# Patient Record
Sex: MALE | Race: ASIAN | NOT HISPANIC OR LATINO | URBAN - METROPOLITAN AREA
[De-identification: names, ages, dates, MRNs, and addresses within clinical notes are randomized per-mention and may not be internally consistent; named-entity substitution may affect disease eponyms.]

---

## 2022-02-08 ENCOUNTER — INPATIENT (INPATIENT)
Facility: HOSPITAL | Age: 72
LOS: 4 days | Discharge: HOME HEALTH | DRG: 235 | End: 2022-02-13
Attending: THORACIC SURGERY (CARDIOTHORACIC VASCULAR SURGERY) | Admitting: THORACIC SURGERY (CARDIOTHORACIC VASCULAR SURGERY)
Payer: MEDICARE

## 2022-02-08 ENCOUNTER — TRANSCRIPTION ENCOUNTER (OUTPATIENT)
Age: 72
End: 2022-02-08

## 2022-02-08 VITALS
SYSTOLIC BLOOD PRESSURE: 111 MMHG | TEMPERATURE: 98 F | HEIGHT: 64 IN | OXYGEN SATURATION: 100 % | DIASTOLIC BLOOD PRESSURE: 69 MMHG | WEIGHT: 89.07 LBS | RESPIRATION RATE: 14 BRPM | HEART RATE: 71 BPM

## 2022-02-08 PROBLEM — Z00.00 ENCOUNTER FOR PREVENTIVE HEALTH EXAMINATION: Status: ACTIVE | Noted: 2022-02-08

## 2022-02-08 LAB
A1C WITH ESTIMATED AVERAGE GLUCOSE RESULT: 5.4 % — SIGNIFICANT CHANGE UP (ref 4–5.6)
ALBUMIN SERPL ELPH-MCNC: 3.9 G/DL — SIGNIFICANT CHANGE UP (ref 3.3–5)
ALP SERPL-CCNC: 62 U/L — SIGNIFICANT CHANGE UP (ref 40–120)
ALT FLD-CCNC: 43 U/L — SIGNIFICANT CHANGE UP (ref 10–45)
ANION GAP SERPL CALC-SCNC: 17 MMOL/L — SIGNIFICANT CHANGE UP (ref 5–17)
APPEARANCE UR: CLEAR — SIGNIFICANT CHANGE UP
APTT BLD: 46.3 SEC — HIGH (ref 27.5–35.5)
AST SERPL-CCNC: 36 U/L — SIGNIFICANT CHANGE UP (ref 10–40)
BACTERIA # UR AUTO: PRESENT /HPF
BILIRUB DIRECT SERPL-MCNC: 0.2 MG/DL — SIGNIFICANT CHANGE UP (ref 0–0.3)
BILIRUB INDIRECT FLD-MCNC: 0.3 MG/DL — SIGNIFICANT CHANGE UP (ref 0.2–1)
BILIRUB SERPL-MCNC: 0.5 MG/DL — SIGNIFICANT CHANGE UP (ref 0.2–1.2)
BILIRUB SERPL-MCNC: 0.5 MG/DL — SIGNIFICANT CHANGE UP (ref 0.2–1.2)
BILIRUB UR-MCNC: NEGATIVE — SIGNIFICANT CHANGE UP
BLD GP AB SCN SERPL QL: NEGATIVE — SIGNIFICANT CHANGE UP
BUN SERPL-MCNC: 38 MG/DL — HIGH (ref 7–23)
CALCIUM SERPL-MCNC: 9.1 MG/DL — SIGNIFICANT CHANGE UP (ref 8.4–10.5)
CHLORIDE SERPL-SCNC: 99 MMOL/L — SIGNIFICANT CHANGE UP (ref 96–108)
CHOLEST SERPL-MCNC: 170 MG/DL — SIGNIFICANT CHANGE UP
CO2 SERPL-SCNC: 20 MMOL/L — LOW (ref 22–31)
COLOR SPEC: YELLOW — SIGNIFICANT CHANGE UP
CREAT SERPL-MCNC: 1.2 MG/DL — SIGNIFICANT CHANGE UP (ref 0.5–1.3)
DIFF PNL FLD: ABNORMAL
EPI CELLS # UR: SIGNIFICANT CHANGE UP /HPF (ref 0–5)
ESTIMATED AVERAGE GLUCOSE: 108 MG/DL — SIGNIFICANT CHANGE UP (ref 68–114)
GLUCOSE SERPL-MCNC: 92 MG/DL — SIGNIFICANT CHANGE UP (ref 70–99)
GLUCOSE UR QL: NEGATIVE — SIGNIFICANT CHANGE UP
HCT VFR BLD CALC: 45.1 % — SIGNIFICANT CHANGE UP (ref 39–50)
HDLC SERPL-MCNC: 67 MG/DL — SIGNIFICANT CHANGE UP
HGB BLD-MCNC: 15 G/DL — SIGNIFICANT CHANGE UP (ref 13–17)
INR BLD: 0.97 — SIGNIFICANT CHANGE UP (ref 0.88–1.16)
KETONES UR-MCNC: NEGATIVE — SIGNIFICANT CHANGE UP
LEUKOCYTE ESTERASE UR-ACNC: NEGATIVE — SIGNIFICANT CHANGE UP
LIPID PNL WITH DIRECT LDL SERPL: 90 MG/DL — SIGNIFICANT CHANGE UP
MAGNESIUM SERPL-MCNC: 2.3 MG/DL — SIGNIFICANT CHANGE UP (ref 1.6–2.6)
MCHC RBC-ENTMCNC: 32.1 PG — SIGNIFICANT CHANGE UP (ref 27–34)
MCHC RBC-ENTMCNC: 33.3 GM/DL — SIGNIFICANT CHANGE UP (ref 32–36)
MCV RBC AUTO: 96.6 FL — SIGNIFICANT CHANGE UP (ref 80–100)
NITRITE UR-MCNC: NEGATIVE — SIGNIFICANT CHANGE UP
NON HDL CHOLESTEROL: 103 MG/DL — SIGNIFICANT CHANGE UP
NRBC # BLD: 0 /100 WBCS — SIGNIFICANT CHANGE UP (ref 0–0)
NT-PROBNP SERPL-SCNC: 2441 PG/ML — HIGH (ref 0–300)
PH UR: 6.5 — SIGNIFICANT CHANGE UP (ref 5–8)
PLATELET # BLD AUTO: 212 K/UL — SIGNIFICANT CHANGE UP (ref 150–400)
POTASSIUM SERPL-MCNC: 4.6 MMOL/L — SIGNIFICANT CHANGE UP (ref 3.5–5.3)
POTASSIUM SERPL-SCNC: 4.6 MMOL/L — SIGNIFICANT CHANGE UP (ref 3.5–5.3)
PROT SERPL-MCNC: 7.1 G/DL — SIGNIFICANT CHANGE UP (ref 6–8.3)
PROT UR-MCNC: NEGATIVE MG/DL — SIGNIFICANT CHANGE UP
PROTHROM AB SERPL-ACNC: 11.6 SEC — SIGNIFICANT CHANGE UP (ref 10.6–13.6)
RBC # BLD: 4.67 M/UL — SIGNIFICANT CHANGE UP (ref 4.2–5.8)
RBC # FLD: 14.4 % — SIGNIFICANT CHANGE UP (ref 10.3–14.5)
RBC CASTS # UR COMP ASSIST: < 5 /HPF — SIGNIFICANT CHANGE UP
RH IG SCN BLD-IMP: POSITIVE — SIGNIFICANT CHANGE UP
SARS-COV-2 RNA SPEC QL NAA+PROBE: SIGNIFICANT CHANGE UP
SODIUM SERPL-SCNC: 136 MMOL/L — SIGNIFICANT CHANGE UP (ref 135–145)
SP GR SPEC: 1.01 — SIGNIFICANT CHANGE UP (ref 1–1.03)
TRIGL SERPL-MCNC: 64 MG/DL — SIGNIFICANT CHANGE UP
TSH SERPL-MCNC: 7.64 UIU/ML — HIGH (ref 0.27–4.2)
UROBILINOGEN FLD QL: 0.2 E.U./DL — SIGNIFICANT CHANGE UP
WBC # BLD: 5.4 K/UL — SIGNIFICANT CHANGE UP (ref 3.8–10.5)
WBC # FLD AUTO: 5.4 K/UL — SIGNIFICANT CHANGE UP (ref 3.8–10.5)
WBC UR QL: < 5 /HPF — SIGNIFICANT CHANGE UP

## 2022-02-08 PROCEDURE — 74176 CT ABD & PELVIS W/O CONTRAST: CPT | Mod: 26

## 2022-02-08 PROCEDURE — 71046 X-RAY EXAM CHEST 2 VIEWS: CPT | Mod: 26

## 2022-02-08 RX ORDER — ATORVASTATIN CALCIUM 80 MG/1
80 TABLET, FILM COATED ORAL AT BEDTIME
Refills: 0 | Status: DISCONTINUED | OUTPATIENT
Start: 2022-02-08 | End: 2022-02-13

## 2022-02-08 RX ORDER — HEPARIN SODIUM 5000 [USP'U]/ML
2500 INJECTION INTRAVENOUS; SUBCUTANEOUS EVERY 8 HOURS
Refills: 0 | Status: DISCONTINUED | OUTPATIENT
Start: 2022-02-08 | End: 2022-02-13

## 2022-02-08 RX ORDER — CHLORHEXIDINE GLUCONATE 213 G/1000ML
1 SOLUTION TOPICAL ONCE
Refills: 0 | Status: COMPLETED | OUTPATIENT
Start: 2022-02-08 | End: 2022-02-08

## 2022-02-08 RX ORDER — PANTOPRAZOLE SODIUM 20 MG/1
40 TABLET, DELAYED RELEASE ORAL
Refills: 0 | Status: DISCONTINUED | OUTPATIENT
Start: 2022-02-08 | End: 2022-02-13

## 2022-02-08 RX ORDER — METOPROLOL TARTRATE 50 MG
12.5 TABLET ORAL
Refills: 0 | Status: DISCONTINUED | OUTPATIENT
Start: 2022-02-08 | End: 2022-02-09

## 2022-02-08 RX ORDER — ASPIRIN/CALCIUM CARB/MAGNESIUM 324 MG
81 TABLET ORAL DAILY
Refills: 0 | Status: DISCONTINUED | OUTPATIENT
Start: 2022-02-08 | End: 2022-02-13

## 2022-02-08 RX ORDER — CHLORHEXIDINE GLUCONATE 213 G/1000ML
15 SOLUTION TOPICAL ONCE
Refills: 0 | Status: DISCONTINUED | OUTPATIENT
Start: 2022-02-08 | End: 2022-02-09

## 2022-02-08 RX ORDER — HEPARIN SODIUM 5000 [USP'U]/ML
5000 INJECTION INTRAVENOUS; SUBCUTANEOUS EVERY 12 HOURS
Refills: 0 | Status: DISCONTINUED | OUTPATIENT
Start: 2022-02-08 | End: 2022-02-08

## 2022-02-08 RX ORDER — CHLORHEXIDINE GLUCONATE 213 G/1000ML
1 SOLUTION TOPICAL ONCE
Refills: 0 | Status: COMPLETED | OUTPATIENT
Start: 2022-02-09 | End: 2022-02-09

## 2022-02-08 RX ORDER — SODIUM CHLORIDE 9 MG/ML
3 INJECTION INTRAMUSCULAR; INTRAVENOUS; SUBCUTANEOUS EVERY 8 HOURS
Refills: 0 | Status: DISCONTINUED | OUTPATIENT
Start: 2022-02-08 | End: 2022-02-09

## 2022-02-08 RX ADMIN — CHLORHEXIDINE GLUCONATE 1 APPLICATION(S): 213 SOLUTION TOPICAL at 20:00

## 2022-02-08 RX ADMIN — CHLORHEXIDINE GLUCONATE 1 APPLICATION(S): 213 SOLUTION TOPICAL at 22:00

## 2022-02-08 RX ADMIN — ATORVASTATIN CALCIUM 80 MILLIGRAM(S): 80 TABLET, FILM COATED ORAL at 21:03

## 2022-02-08 RX ADMIN — Medication 12.5 MILLIGRAM(S): at 20:27

## 2022-02-08 RX ADMIN — SODIUM CHLORIDE 3 MILLILITER(S): 9 INJECTION INTRAMUSCULAR; INTRAVENOUS; SUBCUTANEOUS at 22:42

## 2022-02-08 RX ADMIN — HEPARIN SODIUM 5000 UNIT(S): 5000 INJECTION INTRAVENOUS; SUBCUTANEOUS at 20:27

## 2022-02-08 NOTE — PRE-OP CHECKLIST - SELECT TESTS ORDERED
CMP/PT/PTT/INR/Type and Screen/Urinalysis/EKG/CXR/Results in MD note/COVID-19 90/CMP/PT/PTT/INR/Type and Screen/Urinalysis/EKG/CXR/Results in MD note/POCT Blood Glucose/COVID-19

## 2022-02-08 NOTE — H&P ADULT - NSICDXPASTMEDICALHX_GEN_ALL_CORE_FT
PAST MEDICAL HISTORY:  Acute on chronic combined systolic and diastolic congestive heart failure     Prediabetes     Smoking hx

## 2022-02-08 NOTE — H&P ADULT - NSHPSOCIALHISTORY_GEN_ALL_CORE
Tobacco:  Alcohol:  Elicit drugs:  Occupation:  Home: Lives with , Stairs, assist devices, ADL Tobacco: 40 years of 1ppd smoking, stopped last week for upcoming surgery   Alcohol: 1 drink per night, stopped 1 month ago  Elicit drugs: No  Occupation:   Home: Lives with , Stairs, assist devices, ADL Tobacco: 40 years of 1ppd smoking, stopped last week for upcoming surgery   Alcohol: 1 drink per night, stopped 1 month ago  Elicit drugs: No  Occupation:   Home:

## 2022-02-08 NOTE — H&P ADULT - HISTORY OF PRESENT ILLNESS
70 yo Greenlandic speaking male who is a current smoker (stopped 1 wk ago, smoked for 40 years) with PMHx of CHF (EF 25-30%) and prediabetes (A1c 5.9) coming in for presurgical evaluation. Pt went to [facility] on 2/6 complaining of SOB x 1 week. He stated he had similar symptoms 5 months ago and was admitted but never followed up for outpatient workup. On 2/6 a TTE was done showing LVEF 25-30% and acute on chronic systolic and diastolic CHF. He was then send to [place] for a CT coronary angio on 2/7 which showed severe heavily calcified proximal LAD stenosis to distal L Main Artery. Now presents asymptotically at [facility] for evaluation for Robotic MIDCABG MARTINEZ-LAD with Dr. Santos on 2/9. Denies CP, SOB, abd pain, bowel or urinary changes, N/V, HA, dizziness, vision changes, weakness, dizziness, numbness or tingling, previous abdominal or thoracic surgeries, previous bleeding conditions, recent ASA or blood thinner use. 72 yo Wolof speaking male who is a current smoker (stopped 1 wk ago, smoked for 40 years) with PMHx of CHF (EF 25-30%) and prediabetes (A1c 5.9) coming in for presurgical evaluation. Pt went to [facility] on 2/6 complaining of SOB x 1 week. He stated he had similar symptoms 5 months ago and was admitted but never followed up for outpatient workup. On 2/6 a TTE was done showing LVEF 25-30% and acute on chronic systolic and diastolic CHF. He was then send to [place] for a CT coronary angio on 2/7 which showed severe heavily calcified proximal LAD stenosis to distal L Main Artery. Now presents asymptotically at [facility] for evaluation for Robotic MIDCABG MARTINEZ-LAD with Dr. Santos on 2/9. Upon arrival pt denies CP, SOB, abd pain, bowel or urinary changes, N/V, HA, dizziness, vision changes, weakness, dizziness, numbness or tingling, previous abdominal or thoracic surgeries, previous bleeding conditions, recent ASA or blood thinner use. 72 yo Maltese speaking male current smoker (stopped 1 wk ago, smoked for 40 years) with PMHx of CHF (EF 25-30%) and prediabetes (A1c 5.9) coming in for presurgical evaluation. He went to [facility] on 2/6 complaining of SOB x 1 week. Pt stated he had similar symptoms 5 months ago and was admitted but never followed up for an outpatient workup. On 2/6 a TTE was done showing LVEF 25-30% and acute on chronic systolic and diastolic CHF. He was then send to [facility] for a CT Coronary Angio on 2/7 which showed severe heavily calcified proximal LAD stenosis to distal L Main Artery. Now presents asymptotically at [facility] for surgical evaluation in anticipation of Robotic MIDCABG LIMA-LAD with Dr. Santos on 2/9. Upon arrival pt denies CP, SOB, abd pain, bowel or urinary changes, N/V, HA, dizziness, vision changes, weakness, dizziness, numbness or tingling, previous abdominal or thoracic surgeries, previous bleeding conditions, recent ASA or blood thinner use.

## 2022-02-08 NOTE — H&P ADULT - NSHPPHYSICALEXAM_GEN_ALL_CORE
Neuro: A+O x 3, non-focal, speech clear and intact  HEENT: PERRL, EOMI, oral mucosa pink and moist  Neck: supple, no JVD  CV: regular rate, regular rhythm, +S1S2, no murmurs or rub  Pulm/chest: lung sounds CTA and equal bilaterally, no accessory muscle use noted  Abd: soft, NT, ND, +BS  Ext: JAY x 4, no C/C/E  Skin: warm, well perfused, no rashes Neuro: A+O x 3, non-focal, speech clear and intact  HEENT: + conjunctival icterus, PERRL, EOMI, oral mucosa pink and moist  Neck: supple, no JVD  CV: regular rate, regular rhythm, +S1S2, no murmurs or rub  Pulm/chest: lung sounds CTA and equal bilaterally, no accessory muscle use noted  Abd: soft, NTTP, ND, +BS  Ext: JAY x 4, no C/C/E, +2 pulses B/L   Skin: warm, well perfused, no rashes

## 2022-02-08 NOTE — PATIENT PROFILE ADULT - FALL HARM RISK - UNIVERSAL INTERVENTIONS
Bed in lowest position, wheels locked, appropriate side rails in place/Call bell, personal items and telephone in reach/Instruct patient to call for assistance before getting out of bed or chair/Non-slip footwear when patient is out of bed/[place] to call system/Physically safe environment - no spills, clutter or unnecessary equipment/Purposeful Proactive Rounding/Room/bathroom lighting operational, light cord in reach

## 2022-02-09 ENCOUNTER — APPOINTMENT (OUTPATIENT)
Dept: CARDIOTHORACIC SURGERY | Facility: HOSPITAL | Age: 72
End: 2022-02-09

## 2022-02-09 DIAGNOSIS — Z86.39 PERSONAL HISTORY OF OTHER ENDOCRINE, NUTRITIONAL AND METABOLIC DISEASE: ICD-10-CM

## 2022-02-09 DIAGNOSIS — I50.22 CHRONIC SYSTOLIC (CONGESTIVE) HEART FAILURE: ICD-10-CM

## 2022-02-09 DIAGNOSIS — Z09 ENCOUNTER FOR FOLLOW-UP EXAMINATION AFTER COMPLETED TREATMENT FOR CONDITIONS OTHER THAN MALIGNANT NEOPLASM: ICD-10-CM

## 2022-02-09 DIAGNOSIS — K31.89 OTHER DISEASES OF STOMACH AND DUODENUM: ICD-10-CM

## 2022-02-09 LAB
ALBUMIN SERPL ELPH-MCNC: 3.2 G/DL — LOW (ref 3.3–5)
ALBUMIN SERPL ELPH-MCNC: 4.3 G/DL — SIGNIFICANT CHANGE UP (ref 3.3–5)
ALP SERPL-CCNC: 50 U/L — SIGNIFICANT CHANGE UP (ref 40–120)
ALP SERPL-CCNC: 52 U/L — SIGNIFICANT CHANGE UP (ref 40–120)
ALT FLD-CCNC: 27 U/L — SIGNIFICANT CHANGE UP (ref 10–45)
ALT FLD-CCNC: 28 U/L — SIGNIFICANT CHANGE UP (ref 10–45)
ANION GAP SERPL CALC-SCNC: 10 MMOL/L — SIGNIFICANT CHANGE UP (ref 5–17)
ANION GAP SERPL CALC-SCNC: 11 MMOL/L — SIGNIFICANT CHANGE UP (ref 5–17)
ANION GAP SERPL CALC-SCNC: 13 MMOL/L — SIGNIFICANT CHANGE UP (ref 5–17)
APTT BLD: 28.3 SEC — SIGNIFICANT CHANGE UP (ref 27.5–35.5)
APTT BLD: 31.8 SEC — SIGNIFICANT CHANGE UP (ref 27.5–35.5)
AST SERPL-CCNC: 21 U/L — SIGNIFICANT CHANGE UP (ref 10–40)
AST SERPL-CCNC: 24 U/L — SIGNIFICANT CHANGE UP (ref 10–40)
BASOPHILS # BLD AUTO: 0 K/UL — SIGNIFICANT CHANGE UP (ref 0–0.2)
BASOPHILS NFR BLD AUTO: 0 % — SIGNIFICANT CHANGE UP (ref 0–2)
BILIRUB SERPL-MCNC: 0.4 MG/DL — SIGNIFICANT CHANGE UP (ref 0.2–1.2)
BILIRUB SERPL-MCNC: 0.6 MG/DL — SIGNIFICANT CHANGE UP (ref 0.2–1.2)
BUN SERPL-MCNC: 28 MG/DL — HIGH (ref 7–23)
BUN SERPL-MCNC: 30 MG/DL — HIGH (ref 7–23)
BUN SERPL-MCNC: 34 MG/DL — HIGH (ref 7–23)
CALCIUM SERPL-MCNC: 8.4 MG/DL — SIGNIFICANT CHANGE UP (ref 8.4–10.5)
CALCIUM SERPL-MCNC: 8.9 MG/DL — SIGNIFICANT CHANGE UP (ref 8.4–10.5)
CALCIUM SERPL-MCNC: 9.3 MG/DL — SIGNIFICANT CHANGE UP (ref 8.4–10.5)
CHLORIDE SERPL-SCNC: 104 MMOL/L — SIGNIFICANT CHANGE UP (ref 96–108)
CHLORIDE SERPL-SCNC: 105 MMOL/L — SIGNIFICANT CHANGE UP (ref 96–108)
CHLORIDE SERPL-SCNC: 106 MMOL/L — SIGNIFICANT CHANGE UP (ref 96–108)
CO2 SERPL-SCNC: 22 MMOL/L — SIGNIFICANT CHANGE UP (ref 22–31)
CO2 SERPL-SCNC: 23 MMOL/L — SIGNIFICANT CHANGE UP (ref 22–31)
CO2 SERPL-SCNC: 25 MMOL/L — SIGNIFICANT CHANGE UP (ref 22–31)
CREAT SERPL-MCNC: 0.82 MG/DL — SIGNIFICANT CHANGE UP (ref 0.5–1.3)
CREAT SERPL-MCNC: 0.84 MG/DL — SIGNIFICANT CHANGE UP (ref 0.5–1.3)
CREAT SERPL-MCNC: 0.98 MG/DL — SIGNIFICANT CHANGE UP (ref 0.5–1.3)
EOSINOPHIL # BLD AUTO: 0.14 K/UL — SIGNIFICANT CHANGE UP (ref 0–0.5)
EOSINOPHIL NFR BLD AUTO: 2.6 % — SIGNIFICANT CHANGE UP (ref 0–6)
GAS PNL BLDA: SIGNIFICANT CHANGE UP
GIANT PLATELETS BLD QL SMEAR: PRESENT — SIGNIFICANT CHANGE UP
GLUCOSE BLDC GLUCOMTR-MCNC: 120 MG/DL — HIGH (ref 70–99)
GLUCOSE BLDC GLUCOMTR-MCNC: 128 MG/DL — HIGH (ref 70–99)
GLUCOSE BLDC GLUCOMTR-MCNC: 90 MG/DL — SIGNIFICANT CHANGE UP (ref 70–99)
GLUCOSE SERPL-MCNC: 124 MG/DL — HIGH (ref 70–99)
GLUCOSE SERPL-MCNC: 127 MG/DL — HIGH (ref 70–99)
GLUCOSE SERPL-MCNC: 90 MG/DL — SIGNIFICANT CHANGE UP (ref 70–99)
HCT VFR BLD CALC: 36.4 % — LOW (ref 39–50)
HCT VFR BLD CALC: 37.4 % — LOW (ref 39–50)
HCT VFR BLD CALC: 43.4 % — SIGNIFICANT CHANGE UP (ref 39–50)
HCV AB S/CO SERPL IA: 0.04 S/CO — SIGNIFICANT CHANGE UP
HCV AB SERPL-IMP: SIGNIFICANT CHANGE UP
HGB BLD-MCNC: 11.9 G/DL — LOW (ref 13–17)
HGB BLD-MCNC: 12.7 G/DL — LOW (ref 13–17)
HGB BLD-MCNC: 13.6 G/DL — SIGNIFICANT CHANGE UP (ref 13–17)
INR BLD: 0.94 — SIGNIFICANT CHANGE UP (ref 0.88–1.16)
INR BLD: 1.01 — SIGNIFICANT CHANGE UP (ref 0.88–1.16)
ISTAT ARTERIAL BE: 5 MMOL/L — HIGH (ref -2–3)
ISTAT ARTERIAL GLUCOSE: 117 MG/DL — HIGH (ref 70–99)
ISTAT ARTERIAL HCO3: 30 MMOL/L — HIGH (ref 22–26)
ISTAT ARTERIAL HEMATOCRIT: 33 % — LOW (ref 39–50)
ISTAT ARTERIAL HEMOGLOBIN: 11.2 G/DL — LOW (ref 13–17)
ISTAT ARTERIAL IONIZED CALCIUM: 1.11 MMOL/L — LOW (ref 1.12–1.3)
ISTAT ARTERIAL PCO2: 47 MMHG — HIGH (ref 35–45)
ISTAT ARTERIAL PH: 7.42 — SIGNIFICANT CHANGE UP (ref 7.35–7.45)
ISTAT ARTERIAL PO2: 443 MMHG — HIGH (ref 80–105)
ISTAT ARTERIAL POTASSIUM: 3.6 MMOL/L — SIGNIFICANT CHANGE UP (ref 3.5–5.3)
ISTAT ARTERIAL SO2: 100 % — HIGH (ref 95–98)
ISTAT ARTERIAL SODIUM: 138 MMOL/L — SIGNIFICANT CHANGE UP (ref 135–145)
ISTAT ARTERIAL TCO2: 32 MMOL/L — HIGH (ref 22–31)
LYMPHOCYTES # BLD AUTO: 0.32 K/UL — LOW (ref 1–3.3)
LYMPHOCYTES # BLD AUTO: 6.1 % — LOW (ref 13–44)
MAGNESIUM SERPL-MCNC: 2.1 MG/DL — SIGNIFICANT CHANGE UP (ref 1.6–2.6)
MAGNESIUM SERPL-MCNC: 2.3 MG/DL — SIGNIFICANT CHANGE UP (ref 1.6–2.6)
MAGNESIUM SERPL-MCNC: 2.3 MG/DL — SIGNIFICANT CHANGE UP (ref 1.6–2.6)
MANUAL SMEAR VERIFICATION: SIGNIFICANT CHANGE UP
MCHC RBC-ENTMCNC: 30.4 PG — SIGNIFICANT CHANGE UP (ref 27–34)
MCHC RBC-ENTMCNC: 30.5 PG — SIGNIFICANT CHANGE UP (ref 27–34)
MCHC RBC-ENTMCNC: 31.3 GM/DL — LOW (ref 32–36)
MCHC RBC-ENTMCNC: 32 PG — SIGNIFICANT CHANGE UP (ref 27–34)
MCHC RBC-ENTMCNC: 32.7 GM/DL — SIGNIFICANT CHANGE UP (ref 32–36)
MCHC RBC-ENTMCNC: 34 GM/DL — SIGNIFICANT CHANGE UP (ref 32–36)
MCV RBC AUTO: 93.3 FL — SIGNIFICANT CHANGE UP (ref 80–100)
MCV RBC AUTO: 94.2 FL — SIGNIFICANT CHANGE UP (ref 80–100)
MCV RBC AUTO: 97.1 FL — SIGNIFICANT CHANGE UP (ref 80–100)
MONOCYTES # BLD AUTO: 0.09 K/UL — SIGNIFICANT CHANGE UP (ref 0–0.9)
MONOCYTES NFR BLD AUTO: 1.8 % — LOW (ref 2–14)
NEUTROPHILS # BLD AUTO: 4.64 K/UL — SIGNIFICANT CHANGE UP (ref 1.8–7.4)
NEUTROPHILS NFR BLD AUTO: 88.6 % — HIGH (ref 43–77)
NRBC # BLD: 0 /100 WBCS — SIGNIFICANT CHANGE UP (ref 0–0)
NRBC # BLD: 0 /100 WBCS — SIGNIFICANT CHANGE UP (ref 0–0)
OVALOCYTES BLD QL SMEAR: SLIGHT — SIGNIFICANT CHANGE UP
PHOSPHATE SERPL-MCNC: 3.3 MG/DL — SIGNIFICANT CHANGE UP (ref 2.5–4.5)
PHOSPHATE SERPL-MCNC: 3.8 MG/DL — SIGNIFICANT CHANGE UP (ref 2.5–4.5)
PLAT MORPH BLD: NORMAL — SIGNIFICANT CHANGE UP
PLATELET # BLD AUTO: 172 K/UL — SIGNIFICANT CHANGE UP (ref 150–400)
PLATELET # BLD AUTO: 173 K/UL — SIGNIFICANT CHANGE UP (ref 150–400)
PLATELET # BLD AUTO: 195 K/UL — SIGNIFICANT CHANGE UP (ref 150–400)
POLYCHROMASIA BLD QL SMEAR: SLIGHT — SIGNIFICANT CHANGE UP
POTASSIUM SERPL-MCNC: 3.9 MMOL/L — SIGNIFICANT CHANGE UP (ref 3.5–5.3)
POTASSIUM SERPL-MCNC: 4.4 MMOL/L — SIGNIFICANT CHANGE UP (ref 3.5–5.3)
POTASSIUM SERPL-MCNC: 4.5 MMOL/L — SIGNIFICANT CHANGE UP (ref 3.5–5.3)
POTASSIUM SERPL-SCNC: 3.9 MMOL/L — SIGNIFICANT CHANGE UP (ref 3.5–5.3)
POTASSIUM SERPL-SCNC: 4.4 MMOL/L — SIGNIFICANT CHANGE UP (ref 3.5–5.3)
POTASSIUM SERPL-SCNC: 4.5 MMOL/L — SIGNIFICANT CHANGE UP (ref 3.5–5.3)
PROT SERPL-MCNC: 5.8 G/DL — LOW (ref 6–8.3)
PROT SERPL-MCNC: 7.1 G/DL — SIGNIFICANT CHANGE UP (ref 6–8.3)
PROTHROM AB SERPL-ACNC: 11.3 SEC — SIGNIFICANT CHANGE UP (ref 10.6–13.6)
PROTHROM AB SERPL-ACNC: 12.1 SEC — SIGNIFICANT CHANGE UP (ref 10.6–13.6)
RBC # BLD: 3.9 M/UL — LOW (ref 4.2–5.8)
RBC # BLD: 3.97 M/UL — LOW (ref 4.2–5.8)
RBC # BLD: 4.47 M/UL — SIGNIFICANT CHANGE UP (ref 4.2–5.8)
RBC # FLD: 13.8 % — SIGNIFICANT CHANGE UP (ref 10.3–14.5)
RBC # FLD: 14.1 % — SIGNIFICANT CHANGE UP (ref 10.3–14.5)
RBC # FLD: 14.1 % — SIGNIFICANT CHANGE UP (ref 10.3–14.5)
RBC BLD AUTO: ABNORMAL
RH IG SCN BLD-IMP: POSITIVE — SIGNIFICANT CHANGE UP
SODIUM SERPL-SCNC: 139 MMOL/L — SIGNIFICANT CHANGE UP (ref 135–145)
SODIUM SERPL-SCNC: 140 MMOL/L — SIGNIFICANT CHANGE UP (ref 135–145)
SODIUM SERPL-SCNC: 140 MMOL/L — SIGNIFICANT CHANGE UP (ref 135–145)
VARIANT LYMPHS # BLD: 0.9 % — SIGNIFICANT CHANGE UP (ref 0–6)
WBC # BLD: 10.86 K/UL — HIGH (ref 3.8–10.5)
WBC # BLD: 5.24 K/UL — SIGNIFICANT CHANGE UP (ref 3.8–10.5)
WBC # BLD: 5.71 K/UL — SIGNIFICANT CHANGE UP (ref 3.8–10.5)
WBC # FLD AUTO: 10.86 K/UL — HIGH (ref 3.8–10.5)
WBC # FLD AUTO: 5.24 K/UL — SIGNIFICANT CHANGE UP (ref 3.8–10.5)
WBC # FLD AUTO: 5.71 K/UL — SIGNIFICANT CHANGE UP (ref 3.8–10.5)

## 2022-02-09 PROCEDURE — 33533 CABG ARTERIAL SINGLE: CPT | Mod: AS

## 2022-02-09 PROCEDURE — 99292 CRITICAL CARE ADDL 30 MIN: CPT

## 2022-02-09 PROCEDURE — 71045 X-RAY EXAM CHEST 1 VIEW: CPT | Mod: 26

## 2022-02-09 PROCEDURE — 93306 TTE W/DOPPLER COMPLETE: CPT | Mod: 26

## 2022-02-09 PROCEDURE — 93880 EXTRACRANIAL BILAT STUDY: CPT | Mod: 26

## 2022-02-09 PROCEDURE — 93010 ELECTROCARDIOGRAM REPORT: CPT

## 2022-02-09 PROCEDURE — 33533 CABG ARTERIAL SINGLE: CPT

## 2022-02-09 PROCEDURE — 99291 CRITICAL CARE FIRST HOUR: CPT

## 2022-02-09 DEVICE — CHEST DRAIN THORACIC PVC 32FR: Type: IMPLANTABLE DEVICE | Status: FUNCTIONAL

## 2022-02-09 DEVICE — SHUNT FLO-THRU INTRALUMINAL 2MM X 18MM: Type: IMPLANTABLE DEVICE | Status: FUNCTIONAL

## 2022-02-09 DEVICE — SHUNT CORONARY MAQUET AXIUS 1.5MM: Type: IMPLANTABLE DEVICE | Status: FUNCTIONAL

## 2022-02-09 DEVICE — SHUNT FLO-THRU INTRALUMINAL 1.25MM X 18MM: Type: IMPLANTABLE DEVICE | Status: FUNCTIONAL

## 2022-02-09 DEVICE — SHUNT FLO-THRU INTRALUMINAL 2.5MM X 18MM: Type: IMPLANTABLE DEVICE | Status: FUNCTIONAL

## 2022-02-09 DEVICE — CHEST DRAIN THORACIC PVC 28FR RIGHT ANGLE: Type: IMPLANTABLE DEVICE | Status: FUNCTIONAL

## 2022-02-09 DEVICE — SHUNT CORONARY MAQUET AXIUS 2.75MM: Type: IMPLANTABLE DEVICE | Status: FUNCTIONAL

## 2022-02-09 DEVICE — SHUNT CORONARY MAQUET AXIUS 1.75MM: Type: IMPLANTABLE DEVICE | Status: FUNCTIONAL

## 2022-02-09 DEVICE — SHUNT FLO-THRU INTRALUMINAL 3MM X 18MM: Type: IMPLANTABLE DEVICE | Status: FUNCTIONAL

## 2022-02-09 DEVICE — SHUNT CORONARY MAQUET AXIUS 2.25MM: Type: IMPLANTABLE DEVICE | Status: FUNCTIONAL

## 2022-02-09 DEVICE — LIGATING CLIPS WECK HEMOLOK POLYMER LARGE (PURPLE) 6: Type: IMPLANTABLE DEVICE | Status: FUNCTIONAL

## 2022-02-09 DEVICE — SHUNT FLO-THRU INTRALUMINAL1.75MM X 18MM: Type: IMPLANTABLE DEVICE | Status: FUNCTIONAL

## 2022-02-09 DEVICE — SHUNT CORONARY MAQUET AXIUS 2MM: Type: IMPLANTABLE DEVICE | Status: FUNCTIONAL

## 2022-02-09 DEVICE — SURGICEL 4 X 8": Type: IMPLANTABLE DEVICE | Status: FUNCTIONAL

## 2022-02-09 DEVICE — SHUNT CORONARY MAQUET AXIUS 2.5MM: Type: IMPLANTABLE DEVICE | Status: FUNCTIONAL

## 2022-02-09 DEVICE — SHUNT CORONARY MAQUET AXIUS 1.25MM: Type: IMPLANTABLE DEVICE | Status: FUNCTIONAL

## 2022-02-09 DEVICE — CHEST DRAIN THORACIC PVC 32FR RIGHT ANGLE: Type: IMPLANTABLE DEVICE | Status: FUNCTIONAL

## 2022-02-09 DEVICE — SHUNT FLO-THRU INTRALUMINAL1.5MM X 18MM: Type: IMPLANTABLE DEVICE | Status: FUNCTIONAL

## 2022-02-09 RX ORDER — POTASSIUM CHLORIDE 20 MEQ
20 PACKET (EA) ORAL ONCE
Refills: 0 | Status: COMPLETED | OUTPATIENT
Start: 2022-02-09 | End: 2022-02-09

## 2022-02-09 RX ORDER — FENTANYL CITRATE 50 UG/ML
12.5 INJECTION INTRAVENOUS ONCE
Refills: 0 | Status: DISCONTINUED | OUTPATIENT
Start: 2022-02-09 | End: 2022-02-09

## 2022-02-09 RX ORDER — ACETAMINOPHEN 500 MG
600 TABLET ORAL ONCE
Refills: 0 | Status: COMPLETED | OUTPATIENT
Start: 2022-02-09 | End: 2022-02-09

## 2022-02-09 RX ORDER — SODIUM CHLORIDE 9 MG/ML
1000 INJECTION INTRAMUSCULAR; INTRAVENOUS; SUBCUTANEOUS
Refills: 0 | Status: DISCONTINUED | OUTPATIENT
Start: 2022-02-09 | End: 2022-02-11

## 2022-02-09 RX ORDER — PROPOFOL 10 MG/ML
5 INJECTION, EMULSION INTRAVENOUS
Qty: 1000 | Refills: 0 | Status: DISCONTINUED | OUTPATIENT
Start: 2022-02-09 | End: 2022-02-10

## 2022-02-09 RX ORDER — POLYETHYLENE GLYCOL 3350 17 G/17G
17 POWDER, FOR SOLUTION ORAL DAILY
Refills: 0 | Status: DISCONTINUED | OUTPATIENT
Start: 2022-02-09 | End: 2022-02-13

## 2022-02-09 RX ORDER — NOREPINEPHRINE BITARTRATE/D5W 8 MG/250ML
0.01 PLASTIC BAG, INJECTION (ML) INTRAVENOUS
Qty: 8 | Refills: 0 | Status: DISCONTINUED | OUTPATIENT
Start: 2022-02-09 | End: 2022-02-10

## 2022-02-09 RX ORDER — DEXTROSE 50 % IN WATER 50 %
50 SYRINGE (ML) INTRAVENOUS
Refills: 0 | Status: DISCONTINUED | OUTPATIENT
Start: 2022-02-09 | End: 2022-02-11

## 2022-02-09 RX ORDER — CHLORHEXIDINE GLUCONATE 213 G/1000ML
15 SOLUTION TOPICAL EVERY 12 HOURS
Refills: 0 | Status: DISCONTINUED | OUTPATIENT
Start: 2022-02-09 | End: 2022-02-09

## 2022-02-09 RX ORDER — CHLORHEXIDINE GLUCONATE 213 G/1000ML
5 SOLUTION TOPICAL
Refills: 0 | Status: DISCONTINUED | OUTPATIENT
Start: 2022-02-09 | End: 2022-02-11

## 2022-02-09 RX ORDER — ALBUMIN HUMAN 25 %
50 VIAL (ML) INTRAVENOUS ONCE
Refills: 0 | Status: COMPLETED | OUTPATIENT
Start: 2022-02-09 | End: 2022-02-09

## 2022-02-09 RX ORDER — CHLORHEXIDINE GLUCONATE 213 G/1000ML
1 SOLUTION TOPICAL
Refills: 0 | Status: DISCONTINUED | OUTPATIENT
Start: 2022-02-09 | End: 2022-02-11

## 2022-02-09 RX ORDER — BUPIVACAINE 13.3 MG/ML
20 INJECTION, SUSPENSION, LIPOSOMAL INFILTRATION ONCE
Refills: 0 | Status: DISCONTINUED | OUTPATIENT
Start: 2022-02-09 | End: 2022-02-09

## 2022-02-09 RX ORDER — INSULIN HUMAN 100 [IU]/ML
1 INJECTION, SOLUTION SUBCUTANEOUS
Qty: 50 | Refills: 0 | Status: DISCONTINUED | OUTPATIENT
Start: 2022-02-09 | End: 2022-02-11

## 2022-02-09 RX ORDER — DEXTROSE 50 % IN WATER 50 %
25 SYRINGE (ML) INTRAVENOUS
Refills: 0 | Status: DISCONTINUED | OUTPATIENT
Start: 2022-02-09 | End: 2022-02-11

## 2022-02-09 RX ORDER — ALBUMIN HUMAN 25 %
250 VIAL (ML) INTRAVENOUS ONCE
Refills: 0 | Status: COMPLETED | OUTPATIENT
Start: 2022-02-09 | End: 2022-02-09

## 2022-02-09 RX ORDER — NICARDIPINE HYDROCHLORIDE 30 MG/1
5 CAPSULE, EXTENDED RELEASE ORAL
Qty: 40 | Refills: 0 | Status: DISCONTINUED | OUTPATIENT
Start: 2022-02-09 | End: 2022-02-10

## 2022-02-09 RX ORDER — CEFAZOLIN SODIUM 1 G
2000 VIAL (EA) INJECTION EVERY 8 HOURS
Refills: 0 | Status: COMPLETED | OUTPATIENT
Start: 2022-02-09 | End: 2022-02-11

## 2022-02-09 RX ADMIN — FENTANYL CITRATE 12.5 MICROGRAM(S): 50 INJECTION INTRAVENOUS at 17:00

## 2022-02-09 RX ADMIN — ATORVASTATIN CALCIUM 80 MILLIGRAM(S): 80 TABLET, FILM COATED ORAL at 22:24

## 2022-02-09 RX ADMIN — Medication 125 MILLILITER(S): at 17:54

## 2022-02-09 RX ADMIN — Medication 240 MILLIGRAM(S): at 22:30

## 2022-02-09 RX ADMIN — PROPOFOL 1.21 MICROGRAM(S)/KG/MIN: 10 INJECTION, EMULSION INTRAVENOUS at 17:12

## 2022-02-09 RX ADMIN — Medication 100 MILLIEQUIVALENT(S): at 17:20

## 2022-02-09 RX ADMIN — Medication 12.5 MILLIGRAM(S): at 06:17

## 2022-02-09 RX ADMIN — HEPARIN SODIUM 2500 UNIT(S): 5000 INJECTION INTRAVENOUS; SUBCUTANEOUS at 22:14

## 2022-02-09 RX ADMIN — CHLORHEXIDINE GLUCONATE 5 MILLILITER(S): 213 SOLUTION TOPICAL at 19:19

## 2022-02-09 RX ADMIN — FENTANYL CITRATE 12.5 MICROGRAM(S): 50 INJECTION INTRAVENOUS at 17:30

## 2022-02-09 RX ADMIN — SODIUM CHLORIDE 3 MILLILITER(S): 9 INJECTION INTRAMUSCULAR; INTRAVENOUS; SUBCUTANEOUS at 06:20

## 2022-02-09 RX ADMIN — FENTANYL CITRATE 12.5 MICROGRAM(S): 50 INJECTION INTRAVENOUS at 18:46

## 2022-02-09 RX ADMIN — Medication 600 MILLIGRAM(S): at 23:30

## 2022-02-09 RX ADMIN — Medication 50 MILLILITER(S): at 17:53

## 2022-02-09 RX ADMIN — Medication 100 MILLIGRAM(S): at 21:32

## 2022-02-09 RX ADMIN — FENTANYL CITRATE 12.5 MICROGRAM(S): 50 INJECTION INTRAVENOUS at 19:14

## 2022-02-09 RX ADMIN — NICARDIPINE HYDROCHLORIDE 25 MG/HR: 30 CAPSULE, EXTENDED RELEASE ORAL at 18:46

## 2022-02-09 RX ADMIN — CHLORHEXIDINE GLUCONATE 1 APPLICATION(S): 213 SOLUTION TOPICAL at 06:20

## 2022-02-09 NOTE — BRIEF OPERATIVE NOTE - COMMENTS
Emmanuel Huff PA-C first assisted for the entirety of the case, including but not limited to robotic instrumentation, distal anastomosis, and closure.

## 2022-02-09 NOTE — DIETITIAN NUTRITION RISK NOTIFICATION - TREATMENT: THE FOLLOWING DIET HAS BEEN RECOMMENDED
Diet, NPO after Midnight:      NPO Start Date: 08-Feb-2022,   NPO Start Time: 23:59  Except Medications     Special Instructions for Nursing:  Except Medications (02-08-22 @ 18:57) [Active]  Diet, DASH/TLC:   Sodium & Cholesterol Restricted (02-08-22 @ 18:42) [Active]

## 2022-02-09 NOTE — DIETITIAN INITIAL EVALUATION ADULT. - OTHER INFO
70 yo Kosovan speaking male current smoker (stopped 1 wk ago, smoked for 40 years) with PMHx of CHF (EF 25-30%) and prediabetes (A1c 5.9) coming in for presurgical evaluation. He went to [facility] on 2/6 complaining of SOB x 1 week. Pt stated he had similar symptoms 5 months ago lost to follow up, returend On 2/6 a TTE was done showing LVEF 25-30% and acute on chronic systolic and diastolic CHF. He was then send to [facility] for a CT Coronary Angio on 2/7 which showed severe heavily calcified proximal LAD stenosis to distal L Main Artery. Now presents asymptotically at [facility] for surgical evaluation in anticipation of Robotic MIDCABG LIMA-LAD with Dr. Santos on 2/9.     Attempted to visit pt at bedside for initial assessment, however, pt out of room in OR upon multiple attempts thus interview deferred to EMR at this time. Last documented bowel movement 2/8. NKFA documented. Unable to obtain diet recall PTA. Dosing weight 89 pounds. No weight history in EMR. No edema documented at this time. No pressure ulcers documented at this time. incision per chart. Mauricio score=19. Labs reviewed 2/9; electrolytes within normal limits at this time. Unable to perform nutrition focused physical exam at this time. Based on ASPEN guidelines, pt does not meet criteria for malnutrition at this time, will continue to monitor. Pt NPO at this time. RD to follow up per protocol. See nutrition recommendations below.

## 2022-02-09 NOTE — DIETITIAN INITIAL EVALUATION ADULT. - ADD RECOMMEND
1. Continue with current diet order (monitor need for ONS) 2. Encourage pt to meet nutritional needs as able 3. RD to obtain subjective information/provide diet ed as able 4. Pain and bowel regimen per team 5. Will assess/honor preferences as able 6. Monitor risk for malnutrition 7. Reconsult RD for additional recommendations

## 2022-02-09 NOTE — DIETITIAN INITIAL EVALUATION ADULT. - OTHER CALCULATIONS
Defer fluids to team. Based on Standards of Care pt within % IBW thus actual body weight used for all calculations. Needs adjusted for advanced age, risk of malnutrition and post op healing.

## 2022-02-09 NOTE — BRIEF OPERATIVE NOTE - NSICDXBRIEFPROCEDURE_GEN_ALL_CORE_FT
PROCEDURES:  Minimally invasive direct coronary artery bypass (MIDCAB) with transesophageal echocardiography (JOEY) 09-Feb-2022 16:10:56 Emmanuel Caro

## 2022-02-09 NOTE — DIETITIAN INITIAL EVALUATION ADULT. - PERTINENT LABORATORY DATA
Sodium, Serum: 139 mmol/L  Potassium, Serum: 4.4 mmol/L  Chloride, Serum: 104 mmol/L  BUN, Serum: 34 mg/dL (Elevated)  Creatinine, Serum: 0.98 mg/dL  Glucose, Serum: 90 mg/dL  Calcium, Serum: 9.3 mg/dL  Magnesium, Serum: 2.3 mg/dL    Last HbA1c 5.4% (2/8)  Fingerstick 2/9: 90 mg/dL     Lipid Profile (2/8)  Cholesterol, Serum: 170 mg/dL  Triglycerides, Serum: 64 mg/dL  HDL Cholesterol, Serum: 67 mg/dL   LDL Cholesterol Calculated: 90 mg/dL

## 2022-02-10 PROBLEM — I50.22 CHRONIC SYSTOLIC HEART FAILURE: Status: ACTIVE | Noted: 2022-02-10

## 2022-02-10 PROBLEM — Z09 POSTOP CHECK: Status: ACTIVE | Noted: 2022-02-10

## 2022-02-10 PROBLEM — Z86.39 HISTORY OF HYPERLIPIDEMIA: Status: RESOLVED | Noted: 2022-02-10 | Resolved: 2022-02-10

## 2022-02-10 PROBLEM — K31.89 GASTRIC MASS: Status: RESOLVED | Noted: 2022-02-10 | Resolved: 2022-02-10

## 2022-02-10 LAB
ALBUMIN SERPL ELPH-MCNC: 4 G/DL — SIGNIFICANT CHANGE UP (ref 3.3–5)
ALBUMIN SERPL ELPH-MCNC: 4.1 G/DL — SIGNIFICANT CHANGE UP (ref 3.3–5)
ALP SERPL-CCNC: 50 U/L — SIGNIFICANT CHANGE UP (ref 40–120)
ALP SERPL-CCNC: 55 U/L — SIGNIFICANT CHANGE UP (ref 40–120)
ALT FLD-CCNC: 21 U/L — SIGNIFICANT CHANGE UP (ref 10–45)
ALT FLD-CCNC: 23 U/L — SIGNIFICANT CHANGE UP (ref 10–45)
ANION GAP SERPL CALC-SCNC: 10 MMOL/L — SIGNIFICANT CHANGE UP (ref 5–17)
ANION GAP SERPL CALC-SCNC: 11 MMOL/L — SIGNIFICANT CHANGE UP (ref 5–17)
ANION GAP SERPL CALC-SCNC: 13 MMOL/L — SIGNIFICANT CHANGE UP (ref 5–17)
APTT BLD: 28.6 SEC — SIGNIFICANT CHANGE UP (ref 27.5–35.5)
APTT BLD: 29.8 SEC — SIGNIFICANT CHANGE UP (ref 27.5–35.5)
AST SERPL-CCNC: 22 U/L — SIGNIFICANT CHANGE UP (ref 10–40)
AST SERPL-CCNC: 22 U/L — SIGNIFICANT CHANGE UP (ref 10–40)
BILIRUB SERPL-MCNC: 0.8 MG/DL — SIGNIFICANT CHANGE UP (ref 0.2–1.2)
BILIRUB SERPL-MCNC: 0.8 MG/DL — SIGNIFICANT CHANGE UP (ref 0.2–1.2)
BUN SERPL-MCNC: 26 MG/DL — HIGH (ref 7–23)
BUN SERPL-MCNC: 27 MG/DL — HIGH (ref 7–23)
BUN SERPL-MCNC: 34 MG/DL — HIGH (ref 7–23)
CALCIUM SERPL-MCNC: 8.8 MG/DL — SIGNIFICANT CHANGE UP (ref 8.4–10.5)
CALCIUM SERPL-MCNC: 9 MG/DL — SIGNIFICANT CHANGE UP (ref 8.4–10.5)
CALCIUM SERPL-MCNC: 9.6 MG/DL — SIGNIFICANT CHANGE UP (ref 8.4–10.5)
CHLORIDE SERPL-SCNC: 102 MMOL/L — SIGNIFICANT CHANGE UP (ref 96–108)
CHLORIDE SERPL-SCNC: 103 MMOL/L — SIGNIFICANT CHANGE UP (ref 96–108)
CHLORIDE SERPL-SCNC: 107 MMOL/L — SIGNIFICANT CHANGE UP (ref 96–108)
CO2 SERPL-SCNC: 23 MMOL/L — SIGNIFICANT CHANGE UP (ref 22–31)
CO2 SERPL-SCNC: 23 MMOL/L — SIGNIFICANT CHANGE UP (ref 22–31)
CO2 SERPL-SCNC: 25 MMOL/L — SIGNIFICANT CHANGE UP (ref 22–31)
CREAT SERPL-MCNC: 0.77 MG/DL — SIGNIFICANT CHANGE UP (ref 0.5–1.3)
CREAT SERPL-MCNC: 0.86 MG/DL — SIGNIFICANT CHANGE UP (ref 0.5–1.3)
CREAT SERPL-MCNC: 0.97 MG/DL — SIGNIFICANT CHANGE UP (ref 0.5–1.3)
GAS PNL BLDA: SIGNIFICANT CHANGE UP
GAS PNL BLDA: SIGNIFICANT CHANGE UP
GLUCOSE BLDC GLUCOMTR-MCNC: 114 MG/DL — HIGH (ref 70–99)
GLUCOSE SERPL-MCNC: 107 MG/DL — HIGH (ref 70–99)
GLUCOSE SERPL-MCNC: 115 MG/DL — HIGH (ref 70–99)
GLUCOSE SERPL-MCNC: 140 MG/DL — HIGH (ref 70–99)
HCT VFR BLD CALC: 33.5 % — LOW (ref 39–50)
HCT VFR BLD CALC: 35.3 % — LOW (ref 39–50)
HCT VFR BLD CALC: 38 % — LOW (ref 39–50)
HGB BLD-MCNC: 11.1 G/DL — LOW (ref 13–17)
HGB BLD-MCNC: 12 G/DL — LOW (ref 13–17)
HGB BLD-MCNC: 13 G/DL — SIGNIFICANT CHANGE UP (ref 13–17)
INR BLD: 0.94 — SIGNIFICANT CHANGE UP (ref 0.88–1.16)
INR BLD: 0.99 — SIGNIFICANT CHANGE UP (ref 0.88–1.16)
MAGNESIUM SERPL-MCNC: 2.1 MG/DL — SIGNIFICANT CHANGE UP (ref 1.6–2.6)
MAGNESIUM SERPL-MCNC: 2.1 MG/DL — SIGNIFICANT CHANGE UP (ref 1.6–2.6)
MAGNESIUM SERPL-MCNC: 2.2 MG/DL — SIGNIFICANT CHANGE UP (ref 1.6–2.6)
MCHC RBC-ENTMCNC: 31.7 PG — SIGNIFICANT CHANGE UP (ref 27–34)
MCHC RBC-ENTMCNC: 31.9 PG — SIGNIFICANT CHANGE UP (ref 27–34)
MCHC RBC-ENTMCNC: 32 PG — SIGNIFICANT CHANGE UP (ref 27–34)
MCHC RBC-ENTMCNC: 33.1 GM/DL — SIGNIFICANT CHANGE UP (ref 32–36)
MCHC RBC-ENTMCNC: 34 GM/DL — SIGNIFICANT CHANGE UP (ref 32–36)
MCHC RBC-ENTMCNC: 34.2 GM/DL — SIGNIFICANT CHANGE UP (ref 32–36)
MCV RBC AUTO: 93.1 FL — SIGNIFICANT CHANGE UP (ref 80–100)
MCV RBC AUTO: 93.6 FL — SIGNIFICANT CHANGE UP (ref 80–100)
MCV RBC AUTO: 96.3 FL — SIGNIFICANT CHANGE UP (ref 80–100)
NRBC # BLD: 0 /100 WBCS — SIGNIFICANT CHANGE UP (ref 0–0)
PHOSPHATE SERPL-MCNC: 3.3 MG/DL — SIGNIFICANT CHANGE UP (ref 2.5–4.5)
PHOSPHATE SERPL-MCNC: 3.6 MG/DL — SIGNIFICANT CHANGE UP (ref 2.5–4.5)
PHOSPHATE SERPL-MCNC: 3.9 MG/DL — SIGNIFICANT CHANGE UP (ref 2.5–4.5)
PLATELET # BLD AUTO: 165 K/UL — SIGNIFICANT CHANGE UP (ref 150–400)
PLATELET # BLD AUTO: 168 K/UL — SIGNIFICANT CHANGE UP (ref 150–400)
PLATELET # BLD AUTO: 198 K/UL — SIGNIFICANT CHANGE UP (ref 150–400)
POTASSIUM SERPL-MCNC: 4.5 MMOL/L — SIGNIFICANT CHANGE UP (ref 3.5–5.3)
POTASSIUM SERPL-MCNC: 4.9 MMOL/L — SIGNIFICANT CHANGE UP (ref 3.5–5.3)
POTASSIUM SERPL-MCNC: 5.2 MMOL/L — SIGNIFICANT CHANGE UP (ref 3.5–5.3)
POTASSIUM SERPL-SCNC: 4.5 MMOL/L — SIGNIFICANT CHANGE UP (ref 3.5–5.3)
POTASSIUM SERPL-SCNC: 4.9 MMOL/L — SIGNIFICANT CHANGE UP (ref 3.5–5.3)
POTASSIUM SERPL-SCNC: 5.2 MMOL/L — SIGNIFICANT CHANGE UP (ref 3.5–5.3)
PROT SERPL-MCNC: 6.7 G/DL — SIGNIFICANT CHANGE UP (ref 6–8.3)
PROT SERPL-MCNC: 7 G/DL — SIGNIFICANT CHANGE UP (ref 6–8.3)
PROTHROM AB SERPL-ACNC: 11.3 SEC — SIGNIFICANT CHANGE UP (ref 10.6–13.6)
PROTHROM AB SERPL-ACNC: 11.9 SEC — SIGNIFICANT CHANGE UP (ref 10.6–13.6)
RBC # BLD: 3.48 M/UL — LOW (ref 4.2–5.8)
RBC # BLD: 3.79 M/UL — LOW (ref 4.2–5.8)
RBC # BLD: 4.06 M/UL — LOW (ref 4.2–5.8)
RBC # FLD: 13.9 % — SIGNIFICANT CHANGE UP (ref 10.3–14.5)
RBC # FLD: 14 % — SIGNIFICANT CHANGE UP (ref 10.3–14.5)
RBC # FLD: 14.1 % — SIGNIFICANT CHANGE UP (ref 10.3–14.5)
SODIUM SERPL-SCNC: 138 MMOL/L — SIGNIFICANT CHANGE UP (ref 135–145)
SODIUM SERPL-SCNC: 139 MMOL/L — SIGNIFICANT CHANGE UP (ref 135–145)
SODIUM SERPL-SCNC: 140 MMOL/L — SIGNIFICANT CHANGE UP (ref 135–145)
WBC # BLD: 10.96 K/UL — HIGH (ref 3.8–10.5)
WBC # BLD: 7.49 K/UL — SIGNIFICANT CHANGE UP (ref 3.8–10.5)
WBC # BLD: 9.75 K/UL — SIGNIFICANT CHANGE UP (ref 3.8–10.5)
WBC # FLD AUTO: 10.96 K/UL — HIGH (ref 3.8–10.5)
WBC # FLD AUTO: 7.49 K/UL — SIGNIFICANT CHANGE UP (ref 3.8–10.5)
WBC # FLD AUTO: 9.75 K/UL — SIGNIFICANT CHANGE UP (ref 3.8–10.5)

## 2022-02-10 PROCEDURE — 71045 X-RAY EXAM CHEST 1 VIEW: CPT | Mod: 26,77

## 2022-02-10 PROCEDURE — 99292 CRITICAL CARE ADDL 30 MIN: CPT

## 2022-02-10 PROCEDURE — 99291 CRITICAL CARE FIRST HOUR: CPT

## 2022-02-10 PROCEDURE — 71045 X-RAY EXAM CHEST 1 VIEW: CPT | Mod: 26

## 2022-02-10 RX ORDER — CALCIUM GLUCONATE 100 MG/ML
2 VIAL (ML) INTRAVENOUS ONCE
Refills: 0 | Status: COMPLETED | OUTPATIENT
Start: 2022-02-10 | End: 2022-02-10

## 2022-02-10 RX ORDER — ALBUMIN HUMAN 25 %
250 VIAL (ML) INTRAVENOUS
Refills: 0 | Status: COMPLETED | OUTPATIENT
Start: 2022-02-10 | End: 2022-02-10

## 2022-02-10 RX ORDER — OXYCODONE AND ACETAMINOPHEN 5; 325 MG/1; MG/1
1 TABLET ORAL EVERY 6 HOURS
Refills: 0 | Status: DISCONTINUED | OUTPATIENT
Start: 2022-02-10 | End: 2022-02-13

## 2022-02-10 RX ORDER — METOPROLOL TARTRATE 50 MG
12.5 TABLET ORAL EVERY 6 HOURS
Refills: 0 | Status: DISCONTINUED | OUTPATIENT
Start: 2022-02-10 | End: 2022-02-10

## 2022-02-10 RX ORDER — ACETAMINOPHEN 500 MG
650 TABLET ORAL EVERY 6 HOURS
Refills: 0 | Status: DISCONTINUED | OUTPATIENT
Start: 2022-02-10 | End: 2022-02-10

## 2022-02-10 RX ORDER — ACETAMINOPHEN 500 MG
600 TABLET ORAL ONCE
Refills: 0 | Status: COMPLETED | OUTPATIENT
Start: 2022-02-10 | End: 2022-02-10

## 2022-02-10 RX ORDER — KETOROLAC TROMETHAMINE 30 MG/ML
15 SYRINGE (ML) INJECTION EVERY 6 HOURS
Refills: 0 | Status: DISCONTINUED | OUTPATIENT
Start: 2022-02-10 | End: 2022-02-11

## 2022-02-10 RX ORDER — METOPROLOL TARTRATE 50 MG
25 TABLET ORAL EVERY 8 HOURS
Refills: 0 | Status: DISCONTINUED | OUTPATIENT
Start: 2022-02-10 | End: 2022-02-11

## 2022-02-10 RX ORDER — FUROSEMIDE 40 MG
20 TABLET ORAL ONCE
Refills: 0 | Status: COMPLETED | OUTPATIENT
Start: 2022-02-10 | End: 2022-02-11

## 2022-02-10 RX ORDER — KETOROLAC TROMETHAMINE 30 MG/ML
30 SYRINGE (ML) INJECTION EVERY 6 HOURS
Refills: 0 | Status: DISCONTINUED | OUTPATIENT
Start: 2022-02-10 | End: 2022-02-10

## 2022-02-10 RX ADMIN — Medication 12.5 MILLIGRAM(S): at 14:23

## 2022-02-10 RX ADMIN — POLYETHYLENE GLYCOL 3350 17 GRAM(S): 17 POWDER, FOR SOLUTION ORAL at 14:23

## 2022-02-10 RX ADMIN — HEPARIN SODIUM 2500 UNIT(S): 5000 INJECTION INTRAVENOUS; SUBCUTANEOUS at 14:22

## 2022-02-10 RX ADMIN — Medication 600 MILLIGRAM(S): at 05:00

## 2022-02-10 RX ADMIN — ATORVASTATIN CALCIUM 80 MILLIGRAM(S): 80 TABLET, FILM COATED ORAL at 21:44

## 2022-02-10 RX ADMIN — Medication 200 GRAM(S): at 06:27

## 2022-02-10 RX ADMIN — Medication 240 MILLIGRAM(S): at 04:31

## 2022-02-10 RX ADMIN — Medication 100 MILLIGRAM(S): at 21:43

## 2022-02-10 RX ADMIN — Medication 100 MILLIGRAM(S): at 06:43

## 2022-02-10 RX ADMIN — HEPARIN SODIUM 2500 UNIT(S): 5000 INJECTION INTRAVENOUS; SUBCUTANEOUS at 06:28

## 2022-02-10 RX ADMIN — CHLORHEXIDINE GLUCONATE 1 APPLICATION(S): 213 SOLUTION TOPICAL at 06:37

## 2022-02-10 RX ADMIN — OXYCODONE AND ACETAMINOPHEN 1 TABLET(S): 5; 325 TABLET ORAL at 19:25

## 2022-02-10 RX ADMIN — OXYCODONE AND ACETAMINOPHEN 1 TABLET(S): 5; 325 TABLET ORAL at 09:20

## 2022-02-10 RX ADMIN — Medication 125 MILLILITER(S): at 22:00

## 2022-02-10 RX ADMIN — PANTOPRAZOLE SODIUM 40 MILLIGRAM(S): 20 TABLET, DELAYED RELEASE ORAL at 06:27

## 2022-02-10 RX ADMIN — OXYCODONE AND ACETAMINOPHEN 1 TABLET(S): 5; 325 TABLET ORAL at 18:24

## 2022-02-10 RX ADMIN — CHLORHEXIDINE GLUCONATE 5 MILLILITER(S): 213 SOLUTION TOPICAL at 06:36

## 2022-02-10 RX ADMIN — Medication 81 MILLIGRAM(S): at 14:23

## 2022-02-10 RX ADMIN — Medication 125 MILLILITER(S): at 17:04

## 2022-02-10 RX ADMIN — Medication 12.5 MILLIGRAM(S): at 08:09

## 2022-02-10 RX ADMIN — OXYCODONE AND ACETAMINOPHEN 1 TABLET(S): 5; 325 TABLET ORAL at 10:20

## 2022-02-10 RX ADMIN — Medication 25 MILLIGRAM(S): at 19:09

## 2022-02-10 RX ADMIN — Medication 100 MILLIGRAM(S): at 14:32

## 2022-02-10 RX ADMIN — HEPARIN SODIUM 2500 UNIT(S): 5000 INJECTION INTRAVENOUS; SUBCUTANEOUS at 21:44

## 2022-02-10 RX ADMIN — Medication 125 MILLILITER(S): at 23:00

## 2022-02-10 NOTE — PHYSICAL THERAPY INITIAL EVALUATION ADULT - GENERAL OBSERVATIONS, REHAB EVAL
patient received seated out of bed with no acute distress. +EKG +chest tube to wall suction +tra to wall suction +jack +central line +SCDs +evans

## 2022-02-10 NOTE — PHYSICAL THERAPY INITIAL EVALUATION ADULT - GAIT DEVIATIONS NOTED, PT EVAL
fairly steady, no loss of balance, reported increased pain limiting further activity, mod RUIZ, 3 standing rest breaks

## 2022-02-10 NOTE — PHYSICAL THERAPY INITIAL EVALUATION ADULT - PERTINENT HX OF CURRENT PROBLEM, REHAB EVAL
71 year old male went to [facility] on 2/6 complaining of SOB x 1 week.  He was then sent to [facility] for a CT Coronary Angio on 2/7 which showed severe heavily calcified proximal LAD stenosis to distal L Main Artery. Now presents asymptotically at [facility] for surgical evaluation in anticipation of Robotic MIDCABG LIMA-LAD with Dr. Santos on 2/9.

## 2022-02-11 ENCOUNTER — TRANSCRIPTION ENCOUNTER (OUTPATIENT)
Age: 72
End: 2022-02-11

## 2022-02-11 LAB
ALBUMIN SERPL ELPH-MCNC: 4.3 G/DL — SIGNIFICANT CHANGE UP (ref 3.3–5)
ALP SERPL-CCNC: 46 U/L — SIGNIFICANT CHANGE UP (ref 40–120)
ALT FLD-CCNC: 13 U/L — SIGNIFICANT CHANGE UP (ref 10–45)
ANION GAP SERPL CALC-SCNC: 11 MMOL/L — SIGNIFICANT CHANGE UP (ref 5–17)
APPEARANCE UR: CLEAR — SIGNIFICANT CHANGE UP
APTT BLD: 32.4 SEC — SIGNIFICANT CHANGE UP (ref 27.5–35.5)
AST SERPL-CCNC: 24 U/L — SIGNIFICANT CHANGE UP (ref 10–40)
BACTERIA # UR AUTO: PRESENT /HPF
BILIRUB SERPL-MCNC: 0.8 MG/DL — SIGNIFICANT CHANGE UP (ref 0.2–1.2)
BILIRUB UR-MCNC: NEGATIVE — SIGNIFICANT CHANGE UP
BUN SERPL-MCNC: 34 MG/DL — HIGH (ref 7–23)
CALCIUM SERPL-MCNC: 9.1 MG/DL — SIGNIFICANT CHANGE UP (ref 8.4–10.5)
CHLORIDE SERPL-SCNC: 102 MMOL/L — SIGNIFICANT CHANGE UP (ref 96–108)
CO2 SERPL-SCNC: 26 MMOL/L — SIGNIFICANT CHANGE UP (ref 22–31)
COLOR SPEC: YELLOW — SIGNIFICANT CHANGE UP
CREAT SERPL-MCNC: 1.02 MG/DL — SIGNIFICANT CHANGE UP (ref 0.5–1.3)
DIFF PNL FLD: ABNORMAL
EPI CELLS # UR: SIGNIFICANT CHANGE UP /HPF (ref 0–5)
GLUCOSE SERPL-MCNC: 120 MG/DL — HIGH (ref 70–99)
GLUCOSE UR QL: NEGATIVE — SIGNIFICANT CHANGE UP
HCT VFR BLD CALC: 30.1 % — LOW (ref 39–50)
HGB BLD-MCNC: 10.1 G/DL — LOW (ref 13–17)
INR BLD: 1.08 — SIGNIFICANT CHANGE UP (ref 0.88–1.16)
KETONES UR-MCNC: NEGATIVE — SIGNIFICANT CHANGE UP
LEUKOCYTE ESTERASE UR-ACNC: NEGATIVE — SIGNIFICANT CHANGE UP
MAGNESIUM SERPL-MCNC: 2.1 MG/DL — SIGNIFICANT CHANGE UP (ref 1.6–2.6)
MCHC RBC-ENTMCNC: 32.1 PG — SIGNIFICANT CHANGE UP (ref 27–34)
MCHC RBC-ENTMCNC: 33.6 GM/DL — SIGNIFICANT CHANGE UP (ref 32–36)
MCV RBC AUTO: 95.6 FL — SIGNIFICANT CHANGE UP (ref 80–100)
NITRITE UR-MCNC: NEGATIVE — SIGNIFICANT CHANGE UP
NRBC # BLD: 0 /100 WBCS — SIGNIFICANT CHANGE UP (ref 0–0)
PH UR: 5.5 — SIGNIFICANT CHANGE UP (ref 5–8)
PHOSPHATE SERPL-MCNC: 3 MG/DL — SIGNIFICANT CHANGE UP (ref 2.5–4.5)
PLATELET # BLD AUTO: 141 K/UL — LOW (ref 150–400)
POTASSIUM SERPL-MCNC: 4.7 MMOL/L — SIGNIFICANT CHANGE UP (ref 3.5–5.3)
POTASSIUM SERPL-SCNC: 4.7 MMOL/L — SIGNIFICANT CHANGE UP (ref 3.5–5.3)
PROT SERPL-MCNC: 6.4 G/DL — SIGNIFICANT CHANGE UP (ref 6–8.3)
PROT UR-MCNC: NEGATIVE MG/DL — SIGNIFICANT CHANGE UP
PROTHROM AB SERPL-ACNC: 12.9 SEC — SIGNIFICANT CHANGE UP (ref 10.6–13.6)
RBC # BLD: 3.15 M/UL — LOW (ref 4.2–5.8)
RBC # FLD: 14.2 % — SIGNIFICANT CHANGE UP (ref 10.3–14.5)
RBC CASTS # UR COMP ASSIST: ABNORMAL /HPF
SODIUM SERPL-SCNC: 139 MMOL/L — SIGNIFICANT CHANGE UP (ref 135–145)
SP GR SPEC: 1.02 — SIGNIFICANT CHANGE UP (ref 1–1.03)
UROBILINOGEN FLD QL: 0.2 E.U./DL — SIGNIFICANT CHANGE UP
WBC # BLD: 6.2 K/UL — SIGNIFICANT CHANGE UP (ref 3.8–10.5)
WBC # FLD AUTO: 6.2 K/UL — SIGNIFICANT CHANGE UP (ref 3.8–10.5)
WBC UR QL: > 10 /HPF

## 2022-02-11 PROCEDURE — 71045 X-RAY EXAM CHEST 1 VIEW: CPT | Mod: 26

## 2022-02-11 PROCEDURE — 99232 SBSQ HOSP IP/OBS MODERATE 35: CPT

## 2022-02-11 RX ORDER — SODIUM CHLORIDE 9 MG/ML
3 INJECTION INTRAMUSCULAR; INTRAVENOUS; SUBCUTANEOUS EVERY 8 HOURS
Refills: 0 | Status: DISCONTINUED | OUTPATIENT
Start: 2022-02-11 | End: 2022-02-13

## 2022-02-11 RX ORDER — FUROSEMIDE 40 MG
20 TABLET ORAL ONCE
Refills: 0 | Status: COMPLETED | OUTPATIENT
Start: 2022-02-11 | End: 2022-02-11

## 2022-02-11 RX ORDER — METOPROLOL TARTRATE 50 MG
12.5 TABLET ORAL EVERY 8 HOURS
Refills: 0 | Status: DISCONTINUED | OUTPATIENT
Start: 2022-02-11 | End: 2022-02-12

## 2022-02-11 RX ORDER — FUROSEMIDE 40 MG
20 TABLET ORAL DAILY
Refills: 0 | Status: DISCONTINUED | OUTPATIENT
Start: 2022-02-11 | End: 2022-02-13

## 2022-02-11 RX ORDER — SENNA PLUS 8.6 MG/1
2 TABLET ORAL AT BEDTIME
Refills: 0 | Status: DISCONTINUED | OUTPATIENT
Start: 2022-02-11 | End: 2022-02-13

## 2022-02-11 RX ORDER — AMIODARONE HYDROCHLORIDE 400 MG/1
150 TABLET ORAL ONCE
Refills: 0 | Status: COMPLETED | OUTPATIENT
Start: 2022-02-11 | End: 2022-02-11

## 2022-02-11 RX ORDER — POTASSIUM CHLORIDE 20 MEQ
10 PACKET (EA) ORAL DAILY
Refills: 0 | Status: DISCONTINUED | OUTPATIENT
Start: 2022-02-11 | End: 2022-02-13

## 2022-02-11 RX ORDER — ACETAMINOPHEN 500 MG
650 TABLET ORAL EVERY 6 HOURS
Refills: 0 | Status: DISCONTINUED | OUTPATIENT
Start: 2022-02-11 | End: 2022-02-13

## 2022-02-11 RX ADMIN — HEPARIN SODIUM 2500 UNIT(S): 5000 INJECTION INTRAVENOUS; SUBCUTANEOUS at 21:52

## 2022-02-11 RX ADMIN — Medication 20 MILLIGRAM(S): at 00:17

## 2022-02-11 RX ADMIN — HEPARIN SODIUM 2500 UNIT(S): 5000 INJECTION INTRAVENOUS; SUBCUTANEOUS at 05:49

## 2022-02-11 RX ADMIN — Medication 20 MILLIGRAM(S): at 05:49

## 2022-02-11 RX ADMIN — Medication 12.5 MILLIGRAM(S): at 14:19

## 2022-02-11 RX ADMIN — SENNA PLUS 2 TABLET(S): 8.6 TABLET ORAL at 22:03

## 2022-02-11 RX ADMIN — Medication 100 MILLIGRAM(S): at 05:48

## 2022-02-11 RX ADMIN — PANTOPRAZOLE SODIUM 40 MILLIGRAM(S): 20 TABLET, DELAYED RELEASE ORAL at 06:43

## 2022-02-11 RX ADMIN — SODIUM CHLORIDE 3 MILLILITER(S): 9 INJECTION INTRAMUSCULAR; INTRAVENOUS; SUBCUTANEOUS at 14:10

## 2022-02-11 RX ADMIN — POLYETHYLENE GLYCOL 3350 17 GRAM(S): 17 POWDER, FOR SOLUTION ORAL at 11:24

## 2022-02-11 RX ADMIN — Medication 12.5 MILLIGRAM(S): at 21:51

## 2022-02-11 RX ADMIN — CHLORHEXIDINE GLUCONATE 1 APPLICATION(S): 213 SOLUTION TOPICAL at 05:49

## 2022-02-11 RX ADMIN — Medication 81 MILLIGRAM(S): at 11:23

## 2022-02-11 RX ADMIN — Medication 20 MILLIGRAM(S): at 06:00

## 2022-02-11 RX ADMIN — SODIUM CHLORIDE 3 MILLILITER(S): 9 INJECTION INTRAMUSCULAR; INTRAVENOUS; SUBCUTANEOUS at 22:03

## 2022-02-11 RX ADMIN — HEPARIN SODIUM 2500 UNIT(S): 5000 INJECTION INTRAVENOUS; SUBCUTANEOUS at 14:18

## 2022-02-11 RX ADMIN — ATORVASTATIN CALCIUM 80 MILLIGRAM(S): 80 TABLET, FILM COATED ORAL at 21:52

## 2022-02-11 NOTE — DISCHARGE NOTE PROVIDER - HOSPITAL COURSE
72 yo Sami speaking male current smoker (stopped 1 wk ago, smoked for 40 years) with PMHx of CHF (EF 25-30%) and prediabetes (A1c 5.9) coming in for presurgical evaluation. He went to [facility] on 2/6 complaining of SOB x 1 week. Pt stated he had similar symptoms 5 months ago and was admitted but never followed up for an outpatient workup. On 2/6 a TTE was done showing LVEF 25-30% and acute on chronic systolic and diastolic CHF. He was then send to [facility] for a CT Coronary Angio on 2/7 which showed severe heavily calcified proximal LAD stenosis to distal L Main Artery. Now presents asymptotically at [facility] for surgical evaluation.  On 2/9/22 patient underwent robotic midcab.  Postoperatively brought CTICU on jensen.  Weaned off then required cardene.  Extubated POD 0.  POD 1 weaned off cardene, started on lasix.  POD 2 started on BB, transferred to [facility].  POD 3 stable.  POD 4 Patient ambulating independently with room air, tolerating diet, pain controlled, urinating and having bowel movements.  Patient for discharge home.     Over 35 minutes was spent with the patient reviewing the discharge material including medications, follow up appointments, recovery, concerning symptoms, and how to contact their health care providers if they have questions     CABG  Aspirin               [ X ] Yes  [  ] Contraindicated, Reason_______________________________  Beta-Blocker     [  X] Yes  [  ]Contraindicated, Reason_______________________________  Statin                 [  X] Yes  [  ] Contraindicated, Reason_______________________________

## 2022-02-11 NOTE — DISCHARGE NOTE PROVIDER - NSDCMRMEDTOKEN_GEN_ALL_CORE_FT
acetaminophen 325 mg oral tablet: 2 tab(s) orally every 6 hours, As needed, Mild Pain (1 - 3)  aspirin 81 mg oral tablet, chewable: 1 tab(s) orally once a day  atorvastatin 80 mg oral tablet: 1 tab(s) orally once a day (at bedtime)  furosemide 20 mg oral tablet: 1 tab(s) orally once a day  metoprolol tartrate 25 mg oral tablet: 1 tab(s) orally every 12 hours   pantoprazole 40 mg oral delayed release tablet: 1 tab(s) orally once a day (before a meal)  polyethylene glycol 3350 oral powder for reconstitution: 17 gram(s) orally once a day  senna oral tablet: 2 tab(s) orally once a day (at bedtime)

## 2022-02-11 NOTE — DISCHARGE NOTE PROVIDER - CARE PROVIDERS DIRECT ADDRESSES
[email or web address]

## 2022-02-11 NOTE — DISCHARGE NOTE PROVIDER - CARE PROVIDER_API CALL
Jose Santos (MD)  Cardiovascular Surgery  [address] [place] [address]  Phone: [phone]  Fax: [phone]  Follow Up Time:     Aldo Coello  CARDIOVASCULAR DISEASE  [address] [place] [address]  Phone: [phone]  Fax: [phone]  Follow Up Time:

## 2022-02-11 NOTE — DISCHARGE NOTE PROVIDER - NSDCFUADDINST_GEN_ALL_CORE_FT
-Walk daily as tolerated and use your incentive spirometer every hour.    -No driving or strenuous activity/exercise for 6 weeks, or until cleared by your surgeon.    -Gently clean your incisions with anti-bacterial soap and water, pat dry.  You may leave them open to air.    -Call your doctor if you have shortness of breath, chest pain not relieved by pain medication, dizziness, fever >101.5, or increased redness or drainage from incisions.  -Walk daily as tolerated and use your incentive spirometer every hour.    -No driving or strenuous activity/exercise for 6 weeks, or until cleared by your surgeon.    -Gently clean your incisions with anti-bacterial soap and water, pat dry.  You may leave them open to air.    -Call your doctor if you have shortness of breath, chest pain not relieved by pain medication, dizziness, fever >101.5, or increased redness or drainage from incisions.     -You are being discharged with 2 stitches.  They will be removed during your follow up with Dr. Santos in 1 week.

## 2022-02-11 NOTE — DISCHARGE NOTE PROVIDER - DETAILS OF MALNUTRITION DIAGNOSIS/DIAGNOSES
This patient has been assessed with a concern for Malnutrition and was treated during this hospitalization for the following Nutrition diagnosis/diagnoses:     -  02/09/2022: Underweight (BMI < 19)

## 2022-02-11 NOTE — DISCHARGE NOTE PROVIDER - NSDCCPTREATMENT_GEN_ALL_CORE_FT
PRINCIPAL PROCEDURE  Procedure: Minimally invasive direct coronary artery bypass (MIDCAB) with transesophageal echocardiography (JOEY)  Findings and Treatment: LIMA-LAD

## 2022-02-11 NOTE — DISCHARGE NOTE PROVIDER - NSDCFUADDAPPT_GEN_ALL_CORE_FT
Our office will call with appointments.  If you do not hear from the office by Tuesday, Feb 15th, please call for appointments. Our office will call with appointments.  If you do not hear from the office by Tuesday, Feb 15th, please call for appointments.    You need to follow up with Gastroenterology and urology regarding potential bladder and stomach masses.  The office will arrange for follow up.

## 2022-02-11 NOTE — DISCHARGE NOTE PROVIDER - NSDCCPCAREPLAN_GEN_ALL_CORE_FT
PRINCIPAL DISCHARGE DIAGNOSIS  Diagnosis: CAD (coronary artery disease)  Assessment and Plan of Treatment:       SECONDARY DISCHARGE DIAGNOSES  Diagnosis: Prediabetes  Assessment and Plan of Treatment:     Diagnosis: Acute on chronic systolic congestive heart failure  Assessment and Plan of Treatment:

## 2022-02-12 LAB
ANION GAP SERPL CALC-SCNC: 10 MMOL/L — SIGNIFICANT CHANGE UP (ref 5–17)
BASOPHILS # BLD AUTO: 0.02 K/UL — SIGNIFICANT CHANGE UP (ref 0–0.2)
BASOPHILS NFR BLD AUTO: 0.4 % — SIGNIFICANT CHANGE UP (ref 0–2)
BUN SERPL-MCNC: 26 MG/DL — HIGH (ref 7–23)
CALCIUM SERPL-MCNC: 8.6 MG/DL — SIGNIFICANT CHANGE UP (ref 8.4–10.5)
CHLORIDE SERPL-SCNC: 104 MMOL/L — SIGNIFICANT CHANGE UP (ref 96–108)
CO2 SERPL-SCNC: 24 MMOL/L — SIGNIFICANT CHANGE UP (ref 22–31)
CREAT SERPL-MCNC: 0.93 MG/DL — SIGNIFICANT CHANGE UP (ref 0.5–1.3)
CULTURE RESULTS: NO GROWTH — SIGNIFICANT CHANGE UP
EOSINOPHIL # BLD AUTO: 0.08 K/UL — SIGNIFICANT CHANGE UP (ref 0–0.5)
EOSINOPHIL NFR BLD AUTO: 1.6 % — SIGNIFICANT CHANGE UP (ref 0–6)
GLUCOSE SERPL-MCNC: 113 MG/DL — HIGH (ref 70–99)
HCT VFR BLD CALC: 32.9 % — LOW (ref 39–50)
HGB BLD-MCNC: 10.2 G/DL — LOW (ref 13–17)
IMM GRANULOCYTES NFR BLD AUTO: 0.4 % — SIGNIFICANT CHANGE UP (ref 0–1.5)
LYMPHOCYTES # BLD AUTO: 0.63 K/UL — LOW (ref 1–3.3)
LYMPHOCYTES # BLD AUTO: 12.8 % — LOW (ref 13–44)
MAGNESIUM SERPL-MCNC: 2.1 MG/DL — SIGNIFICANT CHANGE UP (ref 1.6–2.6)
MCHC RBC-ENTMCNC: 30 PG — SIGNIFICANT CHANGE UP (ref 27–34)
MCHC RBC-ENTMCNC: 31 GM/DL — LOW (ref 32–36)
MCV RBC AUTO: 96.8 FL — SIGNIFICANT CHANGE UP (ref 80–100)
MONOCYTES # BLD AUTO: 0.49 K/UL — SIGNIFICANT CHANGE UP (ref 0–0.9)
MONOCYTES NFR BLD AUTO: 9.9 % — SIGNIFICANT CHANGE UP (ref 2–14)
NEUTROPHILS # BLD AUTO: 3.69 K/UL — SIGNIFICANT CHANGE UP (ref 1.8–7.4)
NEUTROPHILS NFR BLD AUTO: 74.9 % — SIGNIFICANT CHANGE UP (ref 43–77)
NRBC # BLD: 0 /100 WBCS — SIGNIFICANT CHANGE UP (ref 0–0)
PLATELET # BLD AUTO: 160 K/UL — SIGNIFICANT CHANGE UP (ref 150–400)
POTASSIUM SERPL-MCNC: 4.5 MMOL/L — SIGNIFICANT CHANGE UP (ref 3.5–5.3)
POTASSIUM SERPL-SCNC: 4.5 MMOL/L — SIGNIFICANT CHANGE UP (ref 3.5–5.3)
RBC # BLD: 3.4 M/UL — LOW (ref 4.2–5.8)
RBC # FLD: 14.1 % — SIGNIFICANT CHANGE UP (ref 10.3–14.5)
SODIUM SERPL-SCNC: 138 MMOL/L — SIGNIFICANT CHANGE UP (ref 135–145)
SPECIMEN SOURCE: SIGNIFICANT CHANGE UP
WBC # BLD: 4.93 K/UL — SIGNIFICANT CHANGE UP (ref 3.8–10.5)
WBC # FLD AUTO: 4.93 K/UL — SIGNIFICANT CHANGE UP (ref 3.8–10.5)

## 2022-02-12 PROCEDURE — 71045 X-RAY EXAM CHEST 1 VIEW: CPT | Mod: 26,76

## 2022-02-12 RX ORDER — METOPROLOL TARTRATE 50 MG
12.5 TABLET ORAL EVERY 6 HOURS
Refills: 0 | Status: DISCONTINUED | OUTPATIENT
Start: 2022-02-12 | End: 2022-02-13

## 2022-02-12 RX ADMIN — HEPARIN SODIUM 2500 UNIT(S): 5000 INJECTION INTRAVENOUS; SUBCUTANEOUS at 21:40

## 2022-02-12 RX ADMIN — HEPARIN SODIUM 2500 UNIT(S): 5000 INJECTION INTRAVENOUS; SUBCUTANEOUS at 05:41

## 2022-02-12 RX ADMIN — PANTOPRAZOLE SODIUM 40 MILLIGRAM(S): 20 TABLET, DELAYED RELEASE ORAL at 08:12

## 2022-02-12 RX ADMIN — Medication 20 MILLIGRAM(S): at 05:42

## 2022-02-12 RX ADMIN — SODIUM CHLORIDE 3 MILLILITER(S): 9 INJECTION INTRAMUSCULAR; INTRAVENOUS; SUBCUTANEOUS at 13:56

## 2022-02-12 RX ADMIN — Medication 12.5 MILLIGRAM(S): at 13:47

## 2022-02-12 RX ADMIN — Medication 10 MILLIEQUIVALENT(S): at 11:41

## 2022-02-12 RX ADMIN — Medication 12.5 MILLIGRAM(S): at 17:32

## 2022-02-12 RX ADMIN — Medication 81 MILLIGRAM(S): at 11:41

## 2022-02-12 RX ADMIN — ATORVASTATIN CALCIUM 80 MILLIGRAM(S): 80 TABLET, FILM COATED ORAL at 21:39

## 2022-02-12 RX ADMIN — SODIUM CHLORIDE 3 MILLILITER(S): 9 INJECTION INTRAMUSCULAR; INTRAVENOUS; SUBCUTANEOUS at 22:55

## 2022-02-12 RX ADMIN — Medication 12.5 MILLIGRAM(S): at 05:42

## 2022-02-12 RX ADMIN — SODIUM CHLORIDE 3 MILLILITER(S): 9 INJECTION INTRAMUSCULAR; INTRAVENOUS; SUBCUTANEOUS at 08:12

## 2022-02-12 RX ADMIN — HEPARIN SODIUM 2500 UNIT(S): 5000 INJECTION INTRAVENOUS; SUBCUTANEOUS at 13:47

## 2022-02-12 NOTE — PROGRESS NOTE ADULT - ASSESSMENT
72 yo Czech speaking male current smoker (stopped 1 wk ago, smoked for 40 years) with PMHx of CHF (EF 25-30%) and prediabetes (A1c 5.9) coming in for presurgical evaluation. He went to [facility] on 2/6 complaining of SOB x 1 week. Pt stated he had similar symptoms 5 months ago and was admitted but never followed up for an outpatient workup. On 2/6 a TTE was done showing LVEF 25-30% and acute on chronic systolic and diastolic CHF. He was then send to [facility] for a CT Coronary Angio on 2/7 which showed severe heavily calcified proximal LAD stenosis to distal L Main Artery. Now presents asymptotically at [facility] for surgical evaluation.  On 2/9/22 patient underwent robotic midcab.  Postoperatively brought CTICU on jensen.  Weaned off then required cardene.  Extubated POD 0.  POD 1 weaned off cardene, started on lasix.  POD 2 started on BB, transferred to [facility].  POD 3 stable.    ==== Neurovascular ====  -No delirium, pain well managed on current regimen  -C/w PRNs for Pain control  -Monitor neuro status    ==== Respiratory ====  -Saturates well on RA     -AM CXR stable, repeat in AM  -Encourage IS 10x/hour while awake, Cough and deep breathing exercises  -Monitor respiratory status via SpO2    ==== Cardiovascular ====  Monitor on Tele  Continue aspirin 81mg QD  Continue Statin  Continue Lopressor  Cotninue lasix 20mg QD    ==== GI ====   -Tolerating PO  -Prophylaxis: Protonix  -C/w bowel regimen  -Will need outpatient workup of potential gastric mass    ==== /Renal ====  -BUN/Cr: 26/0.93  -Trend Cr on AM labs  -Replete electrolytes as needed  -Will need outpatient workup of potential bladder mass  ==== ID ====   Afebrile, asymptomatic  -WBC: 4.93  -Continue to monitor for SIRS/Sepsis syndrome while inpatient    ==== Endocrine ====   -A1C: 5.4, no h/o DM  -TSH: 7.640, no h/o thyroid disease     ==== Hematologic ====   -H/H: 10.2/32/9  -CBC, chem in AM  -DVT ppx: HSQ 5000u q8h and SCDs    ==== Disposition Planning ====  Telemetry

## 2022-02-13 ENCOUNTER — TRANSCRIPTION ENCOUNTER (OUTPATIENT)
Age: 72
End: 2022-02-13

## 2022-02-13 VITALS
OXYGEN SATURATION: 98 % | SYSTOLIC BLOOD PRESSURE: 98 MMHG | HEART RATE: 91 BPM | RESPIRATION RATE: 12 BRPM | DIASTOLIC BLOOD PRESSURE: 60 MMHG

## 2022-02-13 LAB
ANION GAP SERPL CALC-SCNC: 8 MMOL/L — SIGNIFICANT CHANGE UP (ref 5–17)
BUN SERPL-MCNC: 24 MG/DL — HIGH (ref 7–23)
CALCIUM SERPL-MCNC: 8.8 MG/DL — SIGNIFICANT CHANGE UP (ref 8.4–10.5)
CHLORIDE SERPL-SCNC: 108 MMOL/L — SIGNIFICANT CHANGE UP (ref 96–108)
CO2 SERPL-SCNC: 22 MMOL/L — SIGNIFICANT CHANGE UP (ref 22–31)
CREAT SERPL-MCNC: 0.78 MG/DL — SIGNIFICANT CHANGE UP (ref 0.5–1.3)
GLUCOSE SERPL-MCNC: 100 MG/DL — HIGH (ref 70–99)
HCT VFR BLD CALC: 32.8 % — LOW (ref 39–50)
HGB BLD-MCNC: 10.8 G/DL — LOW (ref 13–17)
MAGNESIUM SERPL-MCNC: 1.9 MG/DL — SIGNIFICANT CHANGE UP (ref 1.6–2.6)
MCHC RBC-ENTMCNC: 31.6 PG — SIGNIFICANT CHANGE UP (ref 27–34)
MCHC RBC-ENTMCNC: 32.9 GM/DL — SIGNIFICANT CHANGE UP (ref 32–36)
MCV RBC AUTO: 95.9 FL — SIGNIFICANT CHANGE UP (ref 80–100)
NRBC # BLD: 0 /100 WBCS — SIGNIFICANT CHANGE UP (ref 0–0)
PLATELET # BLD AUTO: 213 K/UL — SIGNIFICANT CHANGE UP (ref 150–400)
POTASSIUM SERPL-MCNC: 4.4 MMOL/L — SIGNIFICANT CHANGE UP (ref 3.5–5.3)
POTASSIUM SERPL-SCNC: 4.4 MMOL/L — SIGNIFICANT CHANGE UP (ref 3.5–5.3)
RBC # BLD: 3.42 M/UL — LOW (ref 4.2–5.8)
RBC # FLD: 14.1 % — SIGNIFICANT CHANGE UP (ref 10.3–14.5)
SODIUM SERPL-SCNC: 138 MMOL/L — SIGNIFICANT CHANGE UP (ref 135–145)
WBC # BLD: 5.32 K/UL — SIGNIFICANT CHANGE UP (ref 3.8–10.5)
WBC # FLD AUTO: 5.32 K/UL — SIGNIFICANT CHANGE UP (ref 3.8–10.5)

## 2022-02-13 PROCEDURE — 71046 X-RAY EXAM CHEST 2 VIEWS: CPT | Mod: 26

## 2022-02-13 RX ORDER — ASPIRIN/CALCIUM CARB/MAGNESIUM 324 MG
1 TABLET ORAL
Qty: 7 | Refills: 0
Start: 2022-02-13 | End: 2022-02-19

## 2022-02-13 RX ORDER — POLYETHYLENE GLYCOL 3350 17 G/17G
17 POWDER, FOR SOLUTION ORAL
Qty: 119 | Refills: 0
Start: 2022-02-13 | End: 2022-02-19

## 2022-02-13 RX ORDER — SENNA PLUS 8.6 MG/1
2 TABLET ORAL
Qty: 60 | Refills: 0
Start: 2022-02-13 | End: 2022-03-14

## 2022-02-13 RX ORDER — ACETAMINOPHEN 500 MG
2 TABLET ORAL
Qty: 56 | Refills: 0
Start: 2022-02-13 | End: 2022-02-19

## 2022-02-13 RX ORDER — SENNA PLUS 8.6 MG/1
2 TABLET ORAL
Qty: 14 | Refills: 0
Start: 2022-02-13 | End: 2022-02-19

## 2022-02-13 RX ORDER — ACETAMINOPHEN 500 MG
2 TABLET ORAL
Qty: 240 | Refills: 0
Start: 2022-02-13 | End: 2022-03-14

## 2022-02-13 RX ORDER — METOPROLOL TARTRATE 50 MG
1 TABLET ORAL
Qty: 14 | Refills: 0
Start: 2022-02-13 | End: 2022-02-19

## 2022-02-13 RX ORDER — ATORVASTATIN CALCIUM 80 MG/1
1 TABLET, FILM COATED ORAL
Qty: 7 | Refills: 0
Start: 2022-02-13 | End: 2022-02-19

## 2022-02-13 RX ORDER — ATORVASTATIN CALCIUM 80 MG/1
1 TABLET, FILM COATED ORAL
Qty: 30 | Refills: 0
Start: 2022-02-13 | End: 2022-03-14

## 2022-02-13 RX ORDER — FUROSEMIDE 40 MG
1 TABLET ORAL
Qty: 7 | Refills: 0
Start: 2022-02-13 | End: 2022-02-19

## 2022-02-13 RX ORDER — METOPROLOL TARTRATE 50 MG
1 TABLET ORAL
Qty: 60 | Refills: 0
Start: 2022-02-13 | End: 2022-03-14

## 2022-02-13 RX ORDER — PANTOPRAZOLE SODIUM 20 MG/1
1 TABLET, DELAYED RELEASE ORAL
Qty: 7 | Refills: 0
Start: 2022-02-13 | End: 2022-02-19

## 2022-02-13 RX ORDER — FUROSEMIDE 40 MG
1 TABLET ORAL
Qty: 30 | Refills: 0
Start: 2022-02-13 | End: 2022-03-14

## 2022-02-13 RX ORDER — PANTOPRAZOLE SODIUM 20 MG/1
1 TABLET, DELAYED RELEASE ORAL
Qty: 30 | Refills: 0
Start: 2022-02-13 | End: 2022-03-14

## 2022-02-13 RX ORDER — ASPIRIN/CALCIUM CARB/MAGNESIUM 324 MG
1 TABLET ORAL
Qty: 30 | Refills: 0
Start: 2022-02-13 | End: 2022-03-14

## 2022-02-13 RX ADMIN — PANTOPRAZOLE SODIUM 40 MILLIGRAM(S): 20 TABLET, DELAYED RELEASE ORAL at 06:19

## 2022-02-13 RX ADMIN — SODIUM CHLORIDE 3 MILLILITER(S): 9 INJECTION INTRAMUSCULAR; INTRAVENOUS; SUBCUTANEOUS at 06:32

## 2022-02-13 RX ADMIN — Medication 10 MILLIEQUIVALENT(S): at 11:18

## 2022-02-13 RX ADMIN — Medication 81 MILLIGRAM(S): at 11:11

## 2022-02-13 RX ADMIN — Medication 12.5 MILLIGRAM(S): at 05:10

## 2022-02-13 RX ADMIN — Medication 20 MILLIGRAM(S): at 06:20

## 2022-02-13 RX ADMIN — POLYETHYLENE GLYCOL 3350 17 GRAM(S): 17 POWDER, FOR SOLUTION ORAL at 11:10

## 2022-02-13 RX ADMIN — Medication 12.5 MILLIGRAM(S): at 00:00

## 2022-02-13 RX ADMIN — HEPARIN SODIUM 2500 UNIT(S): 5000 INJECTION INTRAVENOUS; SUBCUTANEOUS at 05:11

## 2022-02-13 NOTE — DISCHARGE NOTE NURSING/CASE MANAGEMENT/SOCIAL WORK - PATIENT PORTAL LINK FT
You can access the FollowMyHealth Patient Portal offered by [facility] by registering at the following website: [email or web address]. By joining [employer]s FollowMyHealth portal, you will also be able to view your health information using other applications (apps) compatible with our system.

## 2022-02-13 NOTE — PROGRESS NOTE ADULT - SUBJECTIVE AND OBJECTIVE BOX
CTICU  CRITICAL  CARE  ATTENDING:   				   Pertinent clinical, laboratory, radiographic, hemodynamic, echocardiographic, respiratory data, microbiologic data and chart were reviewed and analyzed frequently throughout the course of the day and night    Patient seen and examined with CTS/ SH attending at bedside    Pt is a 71y Male admitted for cad     HEALTH ISSUES - PROBLEM Dx:         FAMILY HISTORY:  No pertinent family history in first degree relatives    PAST MEDICAL & SURGICAL HISTORY:  Acute on chronic combined systolic and diastolic congestive heart failure    Prediabetes    Smoking hx    No significant past surgical history        14 system review was unremarkable      Vital signs, hemodynamic and respiratory parameters were reviewed from the bedside nursing flowsheet.  ICU Vital Signs Last 24 Hrs  T(C): 37.1 (09 Feb 2022 21:15), Max: 37.1 (09 Feb 2022 21:15)  T(F): 98.8 (09 Feb 2022 21:15), Max: 98.8 (09 Feb 2022 21:15)  HR: 80 (09 Feb 2022 23:00) (62 - 97)  BP: 95/59 (09 Feb 2022 23:00) (95/59 - 127/76)  BP(mean): 73 (09 Feb 2022 23:00) (73 - 97)  ABP: 130/58 (09 Feb 2022 23:00) (112/64 - 149/80)  ABP(mean): 81 (09 Feb 2022 23:00) (78 - 105)  RR: 12 (09 Feb 2022 23:00) (12 - 25)  SpO2: 100% (09 Feb 2022 23:00) (79% - 100%)    Adult Advanced Hemodynamics Last 24 Hrs  CVP(mm Hg): 2 (09 Feb 2022 23:00) (-1 - 56)  CVP(cm H2O): --  CO: --  CI: --  PA: --  PA(mean): --  PCWP: --  SVR: --  SVRI: --  PVR: --  PVRI: --, ABG - ( 09 Feb 2022 21:28 )  pH, Arterial: 7.38  pH, Blood: x     /  pCO2: 43    /  pO2: 164   / HCO3: 25    / Base Excess: 0.0   /  SaO2: 99.8              Mode: AC/ CMV (Assist Control/ Continuous Mandatory Ventilation)  RR (machine): 12  TV (machine): 400  FiO2: 100  PEEP: 5  ITime: 1  MAP: 7  PIP: 16    Intake and output was reviewed and the fluid balance was calculated  Daily Height in cm: 162.6 (08 Feb 2022 23:52)    Daily   I&O's Summary    08 Feb 2022 07:01  -  09 Feb 2022 07:00  --------------------------------------------------------  IN: 0 mL / OUT: 150 mL / NET: -150 mL    09 Feb 2022 07:01  -  09 Feb 2022 23:36  --------------------------------------------------------  IN: 766.9 mL / OUT: 910 mL / NET: -143.1 mL        All lines and drain sites were assessed  [facility]      POCT Blood Glucose.: 120 mg/dL (09 Feb 2022 21:20)        Exam:   Gen: NAD   Neuro: Mental status  Lungs: Auscultation of the chest reveals equal bs  Abd: soft, nt/nd  Ext: warm and well perfused  Wound: appears clean and unremarkable  Antibiotics are periop      labs  CBC Full  -  ( 09 Feb 2022 21:49 )  WBC Count : 10.86 K/uL  RBC Count : 3.97 M/uL  Hemoglobin : 12.7 g/dL  Hematocrit : 37.4 %  Platelet Count - Automated : 172 K/uL  Mean Cell Volume : 94.2 fl  Mean Cell Hemoglobin : 32.0 pg  Mean Cell Hemoglobin Concentration : 34.0 gm/dL  Auto Neutrophil # : x  Auto Lymphocyte # : x  Auto Monocyte # : x  Auto Eosinophil # : x  Auto Basophil # : x  Auto Neutrophil % : x  Auto Lymphocyte % : x  Auto Monocyte % : x  Auto Eosinophil % : x  Auto Basophil % : x    02-09    140  |  106  |  28<H>  ----------------------------<  124<H>  4.5   |  23  |  0.82    Ca    8.9      09 Feb 2022 21:49  Phos  3.8     02-09  Mg     2.1     02-09    TPro  7.1  /  Alb  4.3  /  TBili  0.6  /  DBili  x   /  AST  24  /  ALT  27  /  AlkPhos  52  02-09    PT/INR - ( 09 Feb 2022 21:49 )   PT: 11.3 sec;   INR: 0.94          PTT - ( 09 Feb 2022 21:49 )  PTT:28.3 sec    The current medications were reviewed   MEDICATIONS  (STANDING):  aspirin  chewable 81 milliGRAM(s) Oral daily  atorvastatin 80 milliGRAM(s) Oral at bedtime  ceFAZolin   IVPB 2000 milliGRAM(s) IV Intermittent every 8 hours  chlorhexidine 0.12% Liquid 5 milliLiter(s) Oral Mucosa two times a day  chlorhexidine 2% Cloths 1 Application(s) Topical <User Schedule>  dextrose 50% Injectable 50 milliLiter(s) IV Push every 15 minutes  dextrose 50% Injectable 25 milliLiter(s) IV Push every 15 minutes  heparin   Injectable 2500 Unit(s) SubCutaneous every 8 hours  insulin regular Infusion 1 Unit(s)/Hr (1 mL/Hr) IV Continuous <Continuous>  niCARdipine Infusion 5 mG/Hr (25 mL/Hr) IV Continuous <Continuous>  norepinephrine Infusion 0.01 MICROgram(s)/kG/Min (0.76 mL/Hr) IV Continuous <Continuous>  pantoprazole    Tablet 40 milliGRAM(s) Oral before breakfast  polyethylene glycol 3350 17 Gram(s) Oral daily  propofol Infusion 5 MICROgram(s)/kG/Min (1.21 mL/Hr) IV Continuous <Continuous>  sodium chloride 0.9%. 1000 milliLiter(s) (10 mL/Hr) IV Continuous <Continuous>    MEDICATIONS  (PRN):       PROBLEM LIST/ ASSESSMENT:  HEALTH ISSUES - PROBLEM Dx:         Patient is a 71y old  Male who presents with a chief complaint of CAD (09 Feb 2022 19:48) s/p midcab        My plan includes :  -Close hemodynamic, ventilatory and drain monitoring and management per post op routine  -Monitor for arrhythmias and monitor parameters for organ perfusion  -Monitor neurologic status  -Head of the bed should remain elevated to 45 deg .   -Chest PT and IS will be encouraged  -Monitor adequacy of oxygenation and ventilation and attempt to wean oxygen  -Nutritional goals will be met using po eventually , ensure adequate caloric intake and monitor the same  -Stress ulcer and VTE prophylaxis will be achieved    -Glycemic control is satisfactory  -Electrolytes have been repleated as necessary and wound care has been carried out. Pain control has been achieved.   -Agressive physical therapy and early mobility and ambulation goals will be met   The family was updated about the course and plan    CRITICAL CARE TIME SPENT in evaluation and management, reassessments, review and interpretation of labs and x-rays, ventilator and hemodynamic management, formulating a plan and coordinating care: ___30____ MIN.  Time does not include procedural time.      CTICU ATTENDING:      		  Mary Kay Avila MD                        	
CTICU  CRITICAL  CARE  attending     Hand off received 					   Pertinent clinical, laboratory, radiographic, hemodynamic, echocardiographic, respiratory data, microbiologic data and chart were reviewed and analyzed frequently throughout the course of the day and night      71 years old  Libyan speaking with  CHF (EF 25-30%) and prediabetes (A1c 5.9)  He is a current smoker (stopped 1 wk ago, smoked for 40 years)   He was admitted to [facility] with acute on chronic systolic and diastolic CHF.  The patient had similar symptoms 5 months ago and was admitted but never followed up for an outpatient workup.   ECHO:  LVEF 25-30%. Dilated LA and LV.   He was transferred to [facility] for a CT Coronary Angio on 2/7 which showed severe heavily calcified proximal LAD stenosis to distal L Main Artery.       LHC: Triple vessel CAD.    S/P Robotic MIDCAB x 1  (LIMA-LAD).          FAMILY HISTORY:  No pertinent family history in first degree relatives    PAST MEDICAL & SURGICAL HISTORY:  Acute on chronic combined systolic and diastolic congestive heart failure  Prediabetes  Smoking hx  No significant past surgical history          14 system review was unremarkable    Vital signs, hemodynamic and respiratory parameters were reviewed from the bedside nursing flow sheet.  ICU Vital Signs Last 24 Hrs  T(C): 37.2 (10 Feb 2022 21:10), Max: 37.2 (10 Feb 2022 21:10)  T(F): 99 (10 Feb 2022 21:10), Max: 99 (10 Feb 2022 21:10)  HR: 89 (10 Feb 2022 21:00) (79 - 96)  BP: 106/64 (10 Feb 2022 21:00) (93/65 - 129/66)  BP(mean): 79 (10 Feb 2022 21:00) (73 - 95)  ABP: 109/54 (10 Feb 2022 13:00) (109/54 - 155/67)  ABP(mean): 71 (10 Feb 2022 13:00) (71 - 98)  RR: 20 (10 Feb 2022 21:00) (12 - 24)  SpO2: 98% (10 Feb 2022 21:00) (93% - 100%)    Adult Advanced Hemodynamics Last 24 Hrs  CVP(mm Hg): 22 (10 Feb 2022 21:00) (-2 - 22)  CVP(cm H2O): --  CO: --  CI: --  PA: --  PA(mean): --  PCWP: --  SVR: --  SVRI: --  PVR: --  PVRI: --, ABG - ( 10 Feb 2022 11:01 )  pH, Arterial: 7.40  pH, Blood: x     /  pCO2: 36    /  pO2: 83    / HCO3: 22    / Base Excess: -2.0  /  SaO2: 98.0                Intake and output was reviewed and the fluid balance was calculated  Daily     Daily   I&O's Summary    09 Feb 2022 07:01  -  10 Feb 2022 07:00  --------------------------------------------------------  IN: 1221.9 mL / OUT: 1480 mL / NET: -258.1 mL    10 Feb 2022 07:01  -  10 Feb 2022 22:26  --------------------------------------------------------  IN: 2030 mL / OUT: 485 mL / NET: 1545 mL          Neuro: No change in the mental status from the baseline. Follows commands. Moves all 4 extremities.  Neck: No JVD.  CVS: S1, S2, No S3.  Lungs: Good air entry bilaterally.  Abd: Soft. No tenderness. + Bowel sounds.  Vascular: + DP/PT.  Extremities: No edema.  Lymphatic: Normal.  Skin: No abnormalities.      labs  CBC Full  -  ( 10 Feb 2022 21:31 )  WBC Count : 7.49 K/uL  RBC Count : 3.48 M/uL  Hemoglobin : 11.1 g/dL  Hematocrit : 33.5 %  Platelet Count - Automated : 168 K/uL  Mean Cell Volume : 96.3 fl  Mean Cell Hemoglobin : 31.9 pg  Mean Cell Hemoglobin Concentration : 33.1 gm/dL  Auto Neutrophil # : x  Auto Lymphocyte # : x  Auto Monocyte # : x  Auto Eosinophil # : x  Auto Basophil # : x  Auto Neutrophil % : x  Auto Lymphocyte % : x  Auto Monocyte % : x  Auto Eosinophil % : x  Auto Basophil % : x    02-10    138  |  102  |  34<H>  ----------------------------<  115<H>  5.2   |  25  |  0.97    Ca    9.0      10 Feb 2022 21:31  Phos  3.3     02-10  Mg     2.2     02-10    TPro  7.0  /  Alb  4.1  /  TBili  0.8  /  DBili  x   /  AST  22  /  ALT  21  /  AlkPhos  55  02-10    PT/INR - ( 10 Feb 2022 11:07 )   PT: 11.9 sec;   INR: 0.99          PTT - ( 10 Feb 2022 11:07 )  PTT:29.8 sec  The current medications were reviewed   MEDICATIONS  (STANDING):  albumin human  5% IVPB 250 milliLiter(s) IV Intermittent every 1 hour  aspirin  chewable 81 milliGRAM(s) Oral daily  atorvastatin 80 milliGRAM(s) Oral at bedtime  ceFAZolin   IVPB 2000 milliGRAM(s) IV Intermittent every 8 hours  chlorhexidine 0.12% Liquid 5 milliLiter(s) Oral Mucosa two times a day  chlorhexidine 2% Cloths 1 Application(s) Topical <User Schedule>  dextrose 50% Injectable 50 milliLiter(s) IV Push every 15 minutes  dextrose 50% Injectable 25 milliLiter(s) IV Push every 15 minutes  furosemide   Injectable 20 milliGRAM(s) IV Push once  heparin   Injectable 2500 Unit(s) SubCutaneous every 8 hours  insulin regular Infusion 1 Unit(s)/Hr (1 mL/Hr) IV Continuous <Continuous>  metoprolol tartrate 25 milliGRAM(s) Oral every 8 hours  pantoprazole    Tablet 40 milliGRAM(s) Oral before breakfast  polyethylene glycol 3350 17 Gram(s) Oral daily  sodium chloride 0.9%. 1000 milliLiter(s) (10 mL/Hr) IV Continuous <Continuous>    MEDICATIONS  (PRN):  ketorolac   Injectable 15 milliGRAM(s) IV Push every 6 hours PRN Moderate Pain (4 - 6)  oxycodone    5 mG/acetaminophen 325 mG 1 Tablet(s) Oral every 6 hours PRN Moderate Pain (4 - 6)            71y old  Male admitted with CHF and CAD  Dilated cardiomyopathy.  S/P CABG in the setting of compromised LV function.  Hemodynamically stable.  Good oxygenation.  Borderline urine out put.        My plan includes :  Heart Failure Rx.  Statin and Betablocker.  Dual antiplatelet Rx.  Close hemodynamic, ventilatory and drain monitoring and management  Monitor for arrhythmias and monitor parameters for organ perfusion  Monitor neurologic status  Monitor renal function.  Gentle Diuresis.  Head of the bed should remain elevated to 45 deg .   Chest PT and IS will be encouraged  Monitor adequacy of oxygenation and ventilation and attempt to wean oxygen  Nutritional goals will be met using po eventually , ensure adequate caloric intake and monitor the same  Stress ulcer and VTE prophylaxis will be achieved    Glycemic control is satisfactory  Electrolytes have been repleted as necessary and wound care has been carried out. Pain control has been achieved.   Aggressive physical therapy and early mobility and ambulation goals will be met   The family was updated about the course and plan  CRITICAL CARE TIME SPENT in evaluation and management, reassessments, review and interpretation of labs and x-rays, ventilator and hemodynamic management, formulating a plan and coordinating care: ___30____ MIN.  Time does not include procedural time.  CTICU ATTENDING     					    Jono Acosta MD                        	
CTICU  CRITICAL  CARE  attending     Hand off received 					   Pertinent clinical, laboratory, radiographic, hemodynamic, echocardiographic, respiratory data, microbiologic data and chart were reviewed and analyzed frequently throughout the course of the day and night  Patient seen and examined with CTS/ SH attending at bedside    Pt is a 71y , Male, post op day # 2 s/p CABG x 1; robotic assisted midCAB    EF 25-30%    today    remains on room air  being gently diuresed  Beta blocker dose changed to 12.5 Q8    HPI    72 yo Nepalese speaking male current smoker (stopped 1 wk ago, smoked for 40 years) with PMHx of CHF (EF 25-30%) and prediabetes (A1c 5.9) coming in for presurgical evaluation. He went to [facility] on 2/6 complaining of SOB x 1 week. Pt stated he had similar symptoms 5 months ago and was admitted but never followed up for an outpatient workup. On 2/6 a TTE was done showing LVEF 25-30% and acute on chronic systolic and diastolic CHF. He was then send to [facility] for a CT Coronary Angio on 2/7 which showed severe heavily calcified proximal LAD stenosis to distal L Main Artery. Now presents asymptotically at [facility] for surgical evaluation in anticipation of Robotic MIDCABG LIMA-LAD with Dr. Santos on 2/9. Upon arrival pt denies CP, SOB, abd pain, bowel or urinary changes, N/V, HA, dizziness, vision changes, weakness, dizziness, numbness or tingling, previous abdominal or thoracic surgeries, previous bleeding conditions, recent ASA or blood thinner use.        , FAMILY HISTORY:  No pertinent family history in first degree relatives    PAST MEDICAL & SURGICAL HISTORY:  Acute on chronic combined systolic and diastolic congestive heart failure    Prediabetes    Smoking hx    No significant past surgical history      Patient is a 71y old  Male who presents with a chief complaint of CAD (11 Feb 2022 15:40)      14 system review limited 2/2 post op morbidity    Vital signs, hemodynamic and respiratory parameters were reviewed from the bedside nursing flowsheet.  ICU Vital Signs Last 24 Hrs  T(C): 36.7 (11 Feb 2022 17:28), Max: 37.2 (10 Feb 2022 21:10)  T(F): 98.1 (11 Feb 2022 17:28), Max: 99 (10 Feb 2022 21:10)  HR: 87 (11 Feb 2022 17:00) (77 - 93)  BP: 126/72 (11 Feb 2022 17:00) (91/63 - 126/72)  BP(mean): 93 (11 Feb 2022 17:00) (69 - 101)  ABP: --  ABP(mean): --  RR: 21 (11 Feb 2022 17:00) (14 - 25)  SpO2: 99% (11 Feb 2022 17:00) (95% - 100%)    Adult Advanced Hemodynamics Last 24 Hrs  CVP(mm Hg): 3 (11 Feb 2022 10:00) (-2 - 22)  CVP(cm H2O): --  CO: --  CI: --  PA: --  PA(mean): --  PCWP: --  SVR: --  SVRI: --  PVR: --  PVRI: --, ABG - ( 10 Feb 2022 11:01 )  pH, Arterial: 7.40  pH, Blood: x     /  pCO2: 36    /  pO2: 83    / HCO3: 22    / Base Excess: -2.0  /  SaO2: 98.0                Intake and output was reviewed and the fluid balance was calculated  Daily     Daily   I&O's Summary    10 Feb 2022 07:01  -  11 Feb 2022 07:00  --------------------------------------------------------  IN: 2530 mL / OUT: 1260 mL / NET: 1270 mL    11 Feb 2022 07:01  -  11 Feb 2022 17:32  --------------------------------------------------------  IN: 550 mL / OUT: 475 mL / NET: 75 mL        All lines and drain sites were assessed  Glycemic trend was reviewedCAPILLARY BLOOD GLUCOSE      POCT Blood Glucose.: 114 mg/dL (10 Feb 2022 20:55)    No acute change in mental status  Auscultation of the chest reveals equal bs  Abdomen is soft  Extremities are warm and well perfused  Wounds appear clean and unremarkable  Antibiotics are periop    labs  CBC Full  -  ( 11 Feb 2022 01:44 )  WBC Count : 6.20 K/uL  RBC Count : 3.15 M/uL  Hemoglobin : 10.1 g/dL  Hematocrit : 30.1 %  Platelet Count - Automated : 141 K/uL  Mean Cell Volume : 95.6 fl  Mean Cell Hemoglobin : 32.1 pg  Mean Cell Hemoglobin Concentration : 33.6 gm/dL  Auto Neutrophil # : x  Auto Lymphocyte # : x  Auto Monocyte # : x  Auto Eosinophil # : x  Auto Basophil # : x  Auto Neutrophil % : x  Auto Lymphocyte % : x  Auto Monocyte % : x  Auto Eosinophil % : x  Auto Basophil % : x    02-11    139  |  102  |  34<H>  ----------------------------<  120<H>  4.7   |  26  |  1.02    Ca    9.1      11 Feb 2022 01:44  Phos  3.0     02-11  Mg     2.1     02-11    TPro  6.4  /  Alb  4.3  /  TBili  0.8  /  DBili  x   /  AST  24  /  ALT  13  /  AlkPhos  46  02-11    PT/INR - ( 11 Feb 2022 01:44 )   PT: 12.9 sec;   INR: 1.08          PTT - ( 11 Feb 2022 01:44 )  PTT:32.4 sec  The current medications were reviewed   MEDICATIONS  (STANDING):  aspirin  chewable 81 milliGRAM(s) Oral daily  atorvastatin 80 milliGRAM(s) Oral at bedtime  furosemide    Tablet 20 milliGRAM(s) Oral daily  heparin   Injectable 2500 Unit(s) SubCutaneous every 8 hours  metoprolol tartrate 12.5 milliGRAM(s) Oral every 8 hours  pantoprazole    Tablet 40 milliGRAM(s) Oral before breakfast  polyethylene glycol 3350 17 Gram(s) Oral daily  potassium chloride    Tablet ER 10 milliEquivalent(s) Oral daily  senna 2 Tablet(s) Oral at bedtime  sodium chloride 0.9% lock flush 3 milliLiter(s) IV Push every 8 hours    MEDICATIONS  (PRN):  acetaminophen     Tablet .. 650 milliGRAM(s) Oral every 6 hours PRN Mild Pain (1 - 3)  oxycodone    5 mG/acetaminophen 325 mG 1 Tablet(s) Oral every 6 hours PRN Moderate Pain (4 - 6)       PROBLEM LIST/ ASSESSMENT:  HEALTH ISSUES - PROBLEM Dx:      ,   Patient is a 71y old  Male who presents with a chief complaint of CAD (11 Feb 2022 15:40)     s/p CABG       My plan includes :    titrate beta blockers to maintain MAP <70  close hemodynamic, ventilatory and drain monitoring and management per post op routine    Monitor for arrhythmias and monitor parameters for organ perfusion  monitor neurologic status  Head of the bed should remain elevated to 45 deg .   chest PT and IS will be encouraged  monitor adequacy of oxygenation and ventilation and attempt to wean oxygen  Nutritional goals will be met using po eventually , ensure adequate caloric intake and montior the same  Stress ulcer and VTE prophylaxis will be achieved    Glycemic control is satisfactory  Electrolytes have been repleted as necessary and wound care has been carried out. Pain control has been achieved.   agressive physical therapy and early mobility and ambulation goals will be met   The family was updated about the course and plan  CRITICAL CARE TIME SPENT in evaluation and management, reassessments, review and interpretation of labs and x-rays, ventilator and hemodynamic management, formulating a plan and coordinating care: _25___ MIN.  Time does not include procedural time.  CTICU ATTENDING     					    Abdulaziz Rose MD                        	
CTICU  CRITICAL  CARE  attending     Hand off received 					   Pertinent clinical, laboratory, radiographic, hemodynamic, echocardiographic, respiratory data, microbiologic data and chart were reviewed and analyzed frequently throughout the course of the day and night  Patient seen and examined with CTS/ SH attending at bedside  Pt is a 71y , Male, HEALTH ISSUES - PROBLEM Dx:      , FAMILY HISTORY:  No pertinent family history in first degree relatives    PAST MEDICAL & SURGICAL HISTORY:  Acute on chronic combined systolic and diastolic congestive heart failure    Prediabetes    Smoking hx    No significant past surgical history      Patient is a 71y old  Male who presents with a chief complaint of CAD (09 Feb 2022 23:36)      14 system review limited by mentation and multiorgan morbidity     Vital signs, hemodynamic and respiratory parameters were reviewed from the bedside nursing flowsheet.  ICU Vital Signs Last 24 Hrs  T(C): 36.4 (10 Feb 2022 05:01), Max: 37.1 (09 Feb 2022 21:15)  T(F): 97.5 (10 Feb 2022 05:01), Max: 98.8 (09 Feb 2022 21:15)  HR: 87 (10 Feb 2022 06:00) (69 - 97)  BP: 106/65 (10 Feb 2022 04:00) (93/65 - 127/76)  BP(mean): 81 (10 Feb 2022 04:00) (73 - 97)  ABP: 138/60 (10 Feb 2022 06:00) (112/64 - 149/80)  ABP(mean): 84 (10 Feb 2022 06:00) (78 - 105)  RR: 20 (10 Feb 2022 06:00) (12 - 25)  SpO2: 97% (10 Feb 2022 06:00) (79% - 100%)    Adult Advanced Hemodynamics Last 24 Hrs  CVP(mm Hg): 2 (10 Feb 2022 06:00) (-1 - 56)  CVP(cm H2O): --  CO: --  CI: --  PA: --  PA(mean): --  PCWP: --  SVR: --  SVRI: --  PVR: --  PVRI: --, ABG - ( 10 Feb 2022 03:27 )  pH, Arterial: 7.40  pH, Blood: x     /  pCO2: 39    /  pO2: 136   / HCO3: 24    / Base Excess: -0.5  /  SaO2: 99.0              Mode: AC/ CMV (Assist Control/ Continuous Mandatory Ventilation)  RR (machine): 12  TV (machine): 400  FiO2: 100  PEEP: 5  ITime: 1  MAP: 7  PIP: 16    Intake and output was reviewed and the fluid balance was calculated  Daily     Daily   I&O's Summary    09 Feb 2022 07:01  -  10 Feb 2022 07:00  --------------------------------------------------------  IN: 1221.9 mL / OUT: 1480 mL / NET: -258.1 mL        All lines and drain sites were assessed  Gly[facility]      POCT Blood Glucose.: 120 mg/dL (09 Feb 2022 21:20)    No acute change in focality  Auscultation of the chest reveals equal bs  Abdomen is soft  Extremities are warm and well perfused  Wounds appear clean and unremarkable  Antibiotics are periop    labs  CBC Full  -  ( 10 Feb 2022 03:36 )  WBC Count : 9.75 K/uL  RBC Count : 3.79 M/uL  Hemoglobin : 12.0 g/dL  Hematocrit : 35.3 %  Platelet Count - Automated : 165 K/uL  Mean Cell Volume : 93.1 fl  Mean Cell Hemoglobin : 31.7 pg  Mean Cell Hemoglobin Concentration : 34.0 gm/dL  Auto Neutrophil # : x  Auto Lymphocyte # : x  Auto Monocyte # : x  Auto Eosinophil # : x  Auto Basophil # : x  Auto Neutrophil % : x  Auto Lymphocyte % : x  Auto Monocyte % : x  Auto Eosinophil % : x  Auto Basophil % : x    02-10    140  |  107  |  26<H>  ----------------------------<  107<H>  4.5   |  23  |  0.77    Ca    8.8      10 Feb 2022 03:36  Phos  3.9     02-10  Mg     2.1     02-10    TPro  6.7  /  Alb  4.0  /  TBili  0.8  /  DBili  x   /  AST  22  /  ALT  23  /  AlkPhos  50  02-10    PT/INR - ( 10 Feb 2022 03:36 )   PT: 11.3 sec;   INR: 0.94          PTT - ( 10 Feb 2022 03:36 )  PTT:28.6 sec  The current medications were reviewed   MEDICATIONS  (STANDING):  aspirin  chewable 81 milliGRAM(s) Oral daily  atorvastatin 80 milliGRAM(s) Oral at bedtime  ceFAZolin   IVPB 2000 milliGRAM(s) IV Intermittent every 8 hours  chlorhexidine 0.12% Liquid 5 milliLiter(s) Oral Mucosa two times a day  chlorhexidine 2% Cloths 1 Application(s) Topical <User Schedule>  dextrose 50% Injectable 50 milliLiter(s) IV Push every 15 minutes  dextrose 50% Injectable 25 milliLiter(s) IV Push every 15 minutes  heparin   Injectable 2500 Unit(s) SubCutaneous every 8 hours  insulin regular Infusion 1 Unit(s)/Hr (1 mL/Hr) IV Continuous <Continuous>  pantoprazole    Tablet 40 milliGRAM(s) Oral before breakfast  polyethylene glycol 3350 17 Gram(s) Oral daily  sodium chloride 0.9%. 1000 milliLiter(s) (10 mL/Hr) IV Continuous <Continuous>    MEDICATIONS  (PRN):       PROBLEM LIST/ ASSESSMENT:  HEALTH ISSUES - PROBLEM Dx:      ,   Patient is a 71y old  Male who presents with a chief complaint of CAD (09 Feb 2022 23:36)     s/p cardiac surgery                My plan includes :  close hemodynamic, ventilatory and drain monitoring and management per post op routine    Monitor for arrhythmias and monitor parameters for organ perfusion  beta blockade not administered due to hemodynamic instability and bradycardia  monitor neurologic status  Head of the bed should remain elevated to 45 deg .   chest PT and IS will be encouraged  monitor adequacy of oxygenation and ventilation and attempt to wean oxygen  antibiotic regimen will be tailored to the clinical, laboratory and microbiologic data  Nutritional goals will be met using po eventually , ensure adequate caloric intake and montior the same  Stress ulcer and VTE prophylaxis will be achieved    Glycemic control is satisfactory  Electrolytes have been repleted as necessary and wound care has been carried out. Pain control has been achieved.   agressive physical therapy and early mobility and ambulation goals will be met   The family was updated about the course and plan  CRITICAL CARE TIME personally provided by me  in evaluation and management, reassessments, review and interpretation of labs and x-rays, ventilator and hemodynamic management, formulating a plan and coordinating care: ___90____ MIN.  Time does not include procedural time. Time spent was non routine post-operarive caRE and included multiple and repeated evaluations at the bedside  CTICU ATTENDING     					    Ronan Rabago MD                        	
CTICU  CRITICAL  CARE  attending     Hand off received 					   Pertinent clinical, laboratory, radiographic, hemodynamic, echocardiographic, respiratory data, microbiologic data and chart were reviewed and analyzed frequently throughout the course of the day and night  Patient seen and examined with CTS/ SH attending at bedside  Pt is a 71y , Male, HEALTH ISSUES - PROBLEM Dx:      , FAMILY HISTORY:  No pertinent family history in first degree relatives    PAST MEDICAL & SURGICAL HISTORY:  Acute on chronic combined systolic and diastolic congestive heart failure    Prediabetes    Smoking hx    No significant past surgical history      Patient is a 71y old  Male who presents with a chief complaint of CAD (09 Feb 2022 13:58)      14 system review limited by mentation and multiorgan morbidity     Vital signs, hemodynamic and respiratory parameters were reviewed from the bedside nursing flowsheet.  ICU Vital Signs Last 24 Hrs  T(C): 35.8 (09 Feb 2022 17:23), Max: 36.7 (08 Feb 2022 22:12)  T(F): 96.5 (09 Feb 2022 17:23), Max: 98.1 (08 Feb 2022 22:12)  HR: 87 (09 Feb 2022 19:16) (62 - 90)  BP: 115/56 (09 Feb 2022 19:16) (106/67 - 127/76)  BP(mean): 81 (09 Feb 2022 19:16) (73 - 97)  ABP: 126/65 (09 Feb 2022 19:16) (112/64 - 149/80)  ABP(mean): 87 (09 Feb 2022 19:16) (78 - 105)  RR: 21 (09 Feb 2022 19:16) (12 - 25)  SpO2: 79% (09 Feb 2022 19:16) (79% - 100%)    Adult Advanced Hemodynamics Last 24 Hrs  CVP(mm Hg): 5 (09 Feb 2022 19:16) (0 - 56)  CVP(cm H2O): --  CO: --  CI: --  PA: --  PA(mean): --  PCWP: --  SVR: --  SVRI: --  PVR: --  PVRI: --, ABG - ( 09 Feb 2022 18:58 )  pH, Arterial: 7.38  pH, Blood: x     /  pCO2: 42    /  pO2: 145   / HCO3: 25    / Base Excess: -0.4  /  SaO2: 99.7              Mode: AC/ CMV (Assist Control/ Continuous Mandatory Ventilation)  RR (machine): 12  TV (machine): 400  FiO2: 100  PEEP: 5  ITime: 1  MAP: 7  PIP: 16    Intake and output was reviewed and the fluid balance was calculated  Daily Height in cm: 162.6 (08 Feb 2022 23:52)    Daily   I&O's Summary    08 Feb 2022 07:01  -  09 Feb 2022 07:00  --------------------------------------------------------  IN: 0 mL / OUT: 150 mL / NET: -150 mL    09 Feb 2022 07:01  -  09 Feb 2022 19:48  --------------------------------------------------------  IN: 544.4 mL / OUT: 425 mL / NET: 119.4 mL        All lines and drain sites were assessed  [facility]      POCT Blood Glucose.: 128 mg/dL (09 Feb 2022 18:45)    No acute change in focality  Auscultation of the chest reveals equal bs  Abdomen is soft  Extremities are warm and well perfused  Wounds appear clean and unremarkable  Antibiotics are periop    labs  CBC Full  -  ( 09 Feb 2022 16:17 )  WBC Count : 5.24 K/uL  RBC Count : 3.90 M/uL  Hemoglobin : 11.9 g/dL  Hematocrit : 36.4 %  Platelet Count - Automated : 173 K/uL  Mean Cell Volume : 93.3 fl  Mean Cell Hemoglobin : 30.5 pg  Mean Cell Hemoglobin Concentration : 32.7 gm/dL  Auto Neutrophil # : 4.64 K/uL  Auto Lymphocyte # : 0.32 K/uL  Auto Monocyte # : 0.09 K/uL  Auto Eosinophil # : 0.14 K/uL  Auto Basophil # : 0.00 K/uL  Auto Neutrophil % : 88.6 %  Auto Lymphocyte % : 6.1 %  Auto Monocyte % : 1.8 %  Auto Eosinophil % : 2.6 %  Auto Basophil % : 0.0 %    02-09    140  |  105  |  30<H>  ----------------------------<  127<H>  3.9   |  25  |  0.84    Ca    8.4      09 Feb 2022 16:17  Phos  3.3     02-09  Mg     2.3     02-09    TPro  5.8<L>  /  Alb  3.2<L>  /  TBili  0.4  /  DBili  x   /  AST  21  /  ALT  28  /  AlkPhos  50  02-09    PT/INR - ( 09 Feb 2022 16:17 )   PT: 12.1 sec;   INR: 1.01          PTT - ( 09 Feb 2022 16:17 )  PTT:31.8 sec  The current medications were reviewed   MEDICATIONS  (STANDING):  aspirin  chewable 81 milliGRAM(s) Oral daily  atorvastatin 80 milliGRAM(s) Oral at bedtime  ceFAZolin   IVPB 2000 milliGRAM(s) IV Intermittent every 8 hours  chlorhexidine 0.12% Liquid 5 milliLiter(s) Oral Mucosa two times a day  chlorhexidine 2% Cloths 1 Application(s) Topical <User Schedule>  dextrose 50% Injectable 50 milliLiter(s) IV Push every 15 minutes  dextrose 50% Injectable 25 milliLiter(s) IV Push every 15 minutes  heparin   Injectable 2500 Unit(s) SubCutaneous every 8 hours  insulin regular Infusion 1 Unit(s)/Hr (1 mL/Hr) IV Continuous <Continuous>  niCARdipine Infusion 5 mG/Hr (25 mL/Hr) IV Continuous <Continuous>  norepinephrine Infusion 0.01 MICROgram(s)/kG/Min (0.76 mL/Hr) IV Continuous <Continuous>  pantoprazole    Tablet 40 milliGRAM(s) Oral before breakfast  polyethylene glycol 3350 17 Gram(s) Oral daily  propofol Infusion 5 MICROgram(s)/kG/Min (1.21 mL/Hr) IV Continuous <Continuous>  sodium chloride 0.9%. 1000 milliLiter(s) (10 mL/Hr) IV Continuous <Continuous>    MEDICATIONS  (PRN):       PROBLEM LIST/ ASSESSMENT:  HEALTH ISSUES - PROBLEM Dx:      ,   Patient is a 71y old  Male who presents with a chief complaint of CAD (09 Feb 2022 13:58)     s/p cardiac surgery                My plan includes :  close hemodynamic, ventilatory and drain monitoring and management per post op routine    Monitor for arrhythmias and monitor parameters for organ perfusion  beta blockade not administered due to hemodynamic instability and bradycardia  monitor neurologic status  Head of the bed should remain elevated to 45 deg .   chest PT and IS will be encouraged  monitor adequacy of oxygenation and ventilation and attempt to wean oxygen  antibiotic regimen will be tailored to the clinical, laboratory and microbiologic data  Nutritional goals will be met using po eventually , ensure adequate caloric intake and montior the same  Stress ulcer and VTE prophylaxis will be achieved    Glycemic control is satisfactory  Electrolytes have been repleted as necessary and wound care has been carried out. Pain control has been achieved.   agressive physical therapy and early mobility and ambulation goals will be met   The family was updated about the course and plan  CRITICAL CARE TIME personally provided by me  in evaluation and management, reassessments, review and interpretation of labs and x-rays, ventilator and hemodynamic management, formulating a plan and coordinating care: ___90____ MIN.  Time does not include procedural time. Time spent was non routine post-operarive caRE and included multiple and repeated evaluations at the bedside  CTICU ATTENDING     					    Ronan Rabago MD                        	
Patient discussed on morning rounds with Dr. Hubbard    Operation / Date: 2/9/22 MIDCAB    Surgeon: Tom    Referring Physician: Aldo Coello    SUBJECTIVE ASSESSMENT:  71y Male seen and examined at bedside.  Patient with no complaints.  Eager to go home    Hospital Course:  72 yo Greenlandic speaking male current smoker (stopped 1 wk ago, smoked for 40 years) with PMHx of CHF (EF 25-30%) and prediabetes (A1c 5.9) coming in for presurgical evaluation. He went to [facility] on 2/6 complaining of SOB x 1 week. Pt stated he had similar symptoms 5 months ago and was admitted but never followed up for an outpatient workup. On 2/6 a TTE was done showing LVEF 25-30% and acute on chronic systolic and diastolic CHF. He was then send to [facility] for a CT Coronary Angio on 2/7 which showed severe heavily calcified proximal LAD stenosis to distal L Main Artery. Now presents asymptotically at [facility] for surgical evaluation.  On 2/9/22 patient underwent robotic midcab.  Postoperatively brought CTICU on jensen.  Weaned off then required cardene.  Extubated POD 0.  POD 1 weaned off cardene, started on lasix.  POD 2 started on BB, transferred to 9LA.  POD 3 stable.  POD 4 Patient ambulating independently with room air, tolerating diet, pain controlled, urinating and having bowel movements.  Patient for discharge home.     Vital Signs Last 24 Hrs  T(C): 36.6 (13 Feb 2022 05:00), Max: 36.7 (12 Feb 2022 20:29)  T(F): 97.9 (13 Feb 2022 05:00), Max: 98 (12 Feb 2022 20:29)  HR: 93 (13 Feb 2022 09:07) (83 - 137)  BP: 107/59 (13 Feb 2022 09:07) (96/69 - 116/90)  BP(mean): 78 (13 Feb 2022 09:07) (76 - 101)  RR: 12 (13 Feb 2022 09:07) (12 - 18)  SpO2: 98% (13 Feb 2022 09:07) (96% - 100%)  I&O's Detail    12 Feb 2022 07:01  -  13 Feb 2022 07:00  --------------------------------------------------------  IN:  Total IN: 0 mL    OUT:    Voided (mL): 100 mL  Total OUT: 100 mL    Total NET: -100 mL      13 Feb 2022 07:01  -  13 Feb 2022 11:40  --------------------------------------------------------  IN:  Total IN: 0 mL    OUT:    Voided (mL): 100 mL  Total OUT: 100 mL    Total NET: -100 mL          TIE DOWNS REMOVED: No.    PHYSICAL EXAM:    GEN: NAD, looks comfortable  Psych: Mood appropriate  Neuro: A&Ox3.  No focal deficits.  Moving all extremities.   HEENT: No obvious abnormalities  CV: S1S2, regular, no murmurs appreciated.  No carotid bruits.  No JVD  Lungs: Clear B/L.  No wheezing, rales or rhonchi  ABD: Soft, non-tender, non-distended.  +Bowel sounds  EXT: Warm and well perfused.  No peripheral edema noted  Musculoskeletal: Moving all extremities with normal ROM, no joint swelling  PV: Pedal pulses palpable  Incision: Left thoractomy incision CDI, 2 TIE downs in place    LABS:                        10.8   5.32  )-----------( 213      ( 13 Feb 2022 05:44 )             32.8       COUMADIN:  No.        DOSE:                  INDICATION:                GOAL INR:        02-13    138  |  108  |  24<H>  ----------------------------<  100<H>  4.4   |  22  |  0.78    Ca    8.8      13 Feb 2022 05:44  Mg     1.9     02-13            MEDICATIONS  (STANDING):  aspirin  chewable 81 milliGRAM(s) Oral daily  atorvastatin 80 milliGRAM(s) Oral at bedtime  furosemide    Tablet 20 milliGRAM(s) Oral daily  heparin   Injectable 2500 Unit(s) SubCutaneous every 8 hours  metoprolol tartrate 12.5 milliGRAM(s) Oral every 6 hours  pantoprazole    Tablet 40 milliGRAM(s) Oral before breakfast  polyethylene glycol 3350 17 Gram(s) Oral daily  potassium chloride    Tablet ER 10 milliEquivalent(s) Oral daily  senna 2 Tablet(s) Oral at bedtime  sodium chloride 0.9% lock flush 3 milliLiter(s) IV Push every 8 hours      Discharge CXR:  < from: Xray Chest 2 Views PA/Lat (02.13.22 @ 10:18) >  Discoid change left lung base. Small bilateral effusions    < end of copied text >    Discharge ECHO:    
Patient discussed on morning rounds with Dr. Hubbard    [facility]    SUBJECTIVE ASSESSMENT:  71y Male seen and examined at bedside.  Patient with no coomplaints.  Intrepreter # [id number].  Denies chest pain, shortness of breath, nausea, vomiting.  Ambulated 5 times yesterday.  ABle to pull 1000cc on IS.          Vital Signs Last 24 Hrs  T(C): 36.3 (2022 09:05), Max: 36.8 (2022 22:26)  T(F): 97.4 (2022 09:05), Max: 98.3 (2022 22:26)  HR: 88 (2022 09:05) (87 - 117)  BP: 103/73 (2022 09:05) (103/70 - 142/75)  BP(mean): 83 (2022 09:05) (82 - 101)  RR: 18 (2022 08:00) (14 - 24)  SpO2: 96% (2022 09:05) (94% - 100%)  I&O's Detail    2022 07:01  -  2022 07:00  --------------------------------------------------------  IN:    Oral Fluid: 850 mL  Total IN: 850 mL    OUT:    Bulb (mL): 25 mL    Voided (mL): 450 mL  Total OUT: 475 mL    Total NET: 375 mL          CHEST TUBE:  No.    KERMIT DRAIN: No.  EPICARDIAL WIRES: No.  TIE DOWNS: Yes.  AGUILAR: No.    PHYSICAL EXAM:    GEN: NAD, looks comfortable  Psych: Mood appropriate  Neuro: A&Ox3.  No focal deficits.  Moving all extremities.   HEENT: No obvious abnormalities  CV: S1S2, regular, no murmurs appreciated.  No carotid bruits.  No JVD  Lungs: Clear B/L.  No wheezing, rales or rhonchi  ABD: Soft, non-tender, non-distended.  +Bowel sounds  EXT: Warm and well perfused.  No peripheral edema noted  Musculoskeletal: Moving all extremities with normal ROM, no joint swelling  PV: Pedal pulses palpable  Incision: Left thoractomy incision CDI    LABS:                        10.2   4.93  )-----------( 160      ( 2022 05:21 )             32.9       COUMADIN:  No. REASON: .    PT/INR - ( 2022 01:44 )   PT: 12.9 sec;   INR: 1.08          PTT - ( 2022 01:44 )  PTT:32.4 sec    02-    138  |  104  |  26<H>  ----------------------------<  113<H>  4.5   |  24  |  0.93    Ca    8.6      2022 05:21  Phos  3.0       Mg     2.1         TPro  6.4  /  Alb  4.3  /  TBili  0.8  /  DBili  x   /  AST  24  /  ALT  13  /  AlkPhos  46  02-      Urinalysis Basic - ( 2022 01:49 )    Color: Yellow / Appearance: Clear / S.020 / pH: x  Gluc: x / Ketone: NEGATIVE  / Bili: Negative / Urobili: 0.2 E.U./dL   Blood: x / Protein: NEGATIVE mg/dL / Nitrite: NEGATIVE   Leuk Esterase: NEGATIVE / RBC: 5-10 /HPF / WBC > 10 /HPF   Sq Epi: x / Non Sq Epi: 0-5 /HPF / Bacteria: Present /HPF        MEDICATIONS  (STANDING):  aspirin  chewable 81 milliGRAM(s) Oral daily  atorvastatin 80 milliGRAM(s) Oral at bedtime  furosemide    Tablet 20 milliGRAM(s) Oral daily  heparin   Injectable 2500 Unit(s) SubCutaneous every 8 hours  metoprolol tartrate 12.5 milliGRAM(s) Oral every 8 hours  pantoprazole    Tablet 40 milliGRAM(s) Oral before breakfast  polyethylene glycol 3350 17 Gram(s) Oral daily  potassium chloride    Tablet ER 10 milliEquivalent(s) Oral daily  senna 2 Tablet(s) Oral at bedtime  sodium chloride 0.9% lock flush 3 milliLiter(s) IV Push every 8 hours    MEDICATIONS  (PRN):  acetaminophen     Tablet .. 650 milliGRAM(s) Oral every 6 hours PRN Mild Pain (1 - 3)  oxycodone    5 mG/acetaminophen 325 mG 1 Tablet(s) Oral every 6 hours PRN Moderate Pain (4 - 6)        RADIOLOGY & ADDITIONAL TESTS:

## 2022-02-13 NOTE — PROGRESS NOTE ADULT - NUTRITIONAL ASSESSMENT
This patient has been assessed with a concern for Malnutrition and has been determined to have a diagnosis/diagnoses of Underweight (BMI < 19).    This patient is being managed with:   Diet Consistent Carbohydrate/No Snacks-  Entered: Feb 10 2022  8:24AM    
This patient has been assessed with a concern for Malnutrition and has been determined to have a diagnosis/diagnoses of Underweight (BMI < 19).      
This patient has been assessed with a concern for Malnutrition and has been determined to have a diagnosis/diagnoses of Underweight (BMI < 19).    This patient is being managed with:   Diet Consistent Carbohydrate/No Snacks-  Entered: Feb 10 2022  8:24AM

## 2022-02-13 NOTE — DISCHARGE NOTE NURSING/CASE MANAGEMENT/SOCIAL WORK - NSDCPEFALRISK_GEN_ALL_CORE
For information on Fall & Injury Prevention, visit: [email or web address] OR  [email or web address] OR  https://www.cdc.gov/steadi/patient.html

## 2022-02-13 NOTE — DISCHARGE NOTE NURSING/CASE MANAGEMENT/SOCIAL WORK - NSDCFUADDAPPT_GEN_ALL_CORE_FT
Our office will call with appointments.  If you do not hear from the office by Tuesday, Feb 15th, please call for appointments.

## 2022-02-13 NOTE — PROGRESS NOTE ADULT - ASSESSMENT
72 yo English speaking male current smoker (stopped 1 wk ago, smoked for 40 years) with PMHx of CHF (EF 25-30%) and prediabetes (A1c 5.9) coming in for presurgical evaluation. He went to [facility] on 2/6 complaining of SOB x 1 week. Pt stated he had similar symptoms 5 months ago and was admitted but never followed up for an outpatient workup. On 2/6 a TTE was done showing LVEF 25-30% and acute on chronic systolic and diastolic CHF. He was then send to [facility] for a CT Coronary Angio on 2/7 which showed severe heavily calcified proximal LAD stenosis to distal L Main Artery. Now presents asymptotically at [facility] for surgical evaluation.  On 2/9/22 patient underwent robotic midcab.  Postoperatively brought CTICU on jensen.  Weaned off then required cardene.  Extubated POD 0.  POD 1 weaned off cardene, started on lasix.  POD 2 started on BB, transferred to 9LA.  POD 3 stable.  POD 4 Patient ambulating independently with room air, tolerating diet, pain controlled, urinating and having bowel movements.  Patient for discharge home.  72 yo Ukrainian speaking male current smoker (stopped 1 wk ago, smoked for 40 years) with PMHx of CHF (EF 25-30%) and prediabetes (A1c 5.9) coming in for presurgical evaluation. He went to [facility] on 2/6 complaining of SOB x 1 week. Pt stated he had similar symptoms 5 months ago and was admitted but never followed up for an outpatient workup. On 2/6 a TTE was done showing LVEF 25-30% and acute on chronic systolic and diastolic CHF. He was then send to [facility] for a CT Coronary Angio on 2/7 which showed severe heavily calcified proximal LAD stenosis to distal L Main Artery. Now presents asymptotically at [facility] for surgical evaluation.  On 2/9/22 patient underwent robotic midcab.  Postoperatively brought CTICU on jensen.  Weaned off then required cardene.  Extubated POD 0.  POD 1 weaned off cardene, started on lasix.  POD 2 started on BB, transferred to 9LA.  POD 3 stable.  POD 4 Patient ambulating independently with room air, tolerating diet, pain controlled, urinating and having bowel movements.  Patient for discharge home.  Will need outpatient assessment of potential GI and bladder masses.

## 2022-02-14 ENCOUNTER — NON-APPOINTMENT (OUTPATIENT)
Age: 72
End: 2022-02-14

## 2022-02-14 RX ORDER — METOPROLOL TARTRATE 50 MG
1 TABLET ORAL
Qty: 60 | Refills: 0
Start: 2022-02-14 | End: 2022-03-15

## 2022-02-14 RX ORDER — ASPIRIN/CALCIUM CARB/MAGNESIUM 324 MG
1 TABLET ORAL
Qty: 30 | Refills: 0
Start: 2022-02-14 | End: 2022-03-15

## 2022-02-14 RX ORDER — FUROSEMIDE 40 MG
1 TABLET ORAL
Qty: 30 | Refills: 0
Start: 2022-02-14 | End: 2022-03-15

## 2022-02-14 RX ORDER — PANTOPRAZOLE SODIUM 20 MG/1
1 TABLET, DELAYED RELEASE ORAL
Qty: 30 | Refills: 0
Start: 2022-02-14 | End: 2022-03-15

## 2022-02-14 RX ORDER — ACETAMINOPHEN 500 MG
2 TABLET ORAL
Qty: 240 | Refills: 0
Start: 2022-02-14 | End: 2022-03-15

## 2022-02-14 RX ORDER — ATORVASTATIN CALCIUM 80 MG/1
1 TABLET, FILM COATED ORAL
Qty: 30 | Refills: 0
Start: 2022-02-14 | End: 2022-03-15

## 2022-02-15 ENCOUNTER — NON-APPOINTMENT (OUTPATIENT)
Age: 72
End: 2022-02-15

## 2022-02-15 PROBLEM — I50.43 ACUTE ON CHRONIC COMBINED SYSTOLIC (CONGESTIVE) AND DIASTOLIC (CONGESTIVE) HEART FAILURE: Chronic | Status: ACTIVE | Noted: 2022-02-08

## 2022-02-15 PROBLEM — R73.03 PREDIABETES: Chronic | Status: ACTIVE | Noted: 2022-02-08

## 2022-02-15 PROBLEM — Z87.891 PERSONAL HISTORY OF NICOTINE DEPENDENCE: Chronic | Status: ACTIVE | Noted: 2022-02-08

## 2022-02-15 RX ORDER — SENNOSIDES 8.6 MG TABLETS 8.6 MG/1
8.6 TABLET ORAL
Qty: 14 | Refills: 0 | Status: ACTIVE | COMMUNITY
Start: 2022-02-15 | End: 1900-01-01

## 2022-02-15 RX ORDER — ASPIRIN ENTERIC COATED TABLETS 81 MG 81 MG/1
81 TABLET, DELAYED RELEASE ORAL DAILY
Qty: 30 | Refills: 0 | Status: ACTIVE | COMMUNITY
Start: 2022-02-15 | End: 1900-01-01

## 2022-02-15 RX ORDER — METOPROLOL TARTRATE 25 MG/1
25 TABLET, FILM COATED ORAL
Qty: 60 | Refills: 0 | Status: ACTIVE | COMMUNITY
Start: 2022-02-15 | End: 1900-01-01

## 2022-02-15 RX ORDER — ACETAMINOPHEN 325 MG/1
325 TABLET ORAL
Qty: 360 | Refills: 0 | Status: ACTIVE | COMMUNITY
Start: 2022-02-15 | End: 1900-01-01

## 2022-02-15 RX ORDER — PANTOPRAZOLE 40 MG/1
40 TABLET, DELAYED RELEASE ORAL DAILY
Qty: 90 | Refills: 0 | Status: ACTIVE | COMMUNITY
Start: 2022-02-15 | End: 1900-01-01

## 2022-02-15 RX ORDER — ATORVASTATIN CALCIUM 80 MG/1
80 TABLET, FILM COATED ORAL
Qty: 60 | Refills: 0 | Status: ACTIVE | COMMUNITY
Start: 2022-02-15 | End: 1900-01-01

## 2022-02-15 RX ORDER — POLYETHYLENE GLYCOL 3350 17 G/17G
17 POWDER, FOR SOLUTION ORAL DAILY
Qty: 30 | Refills: 0 | Status: ACTIVE | COMMUNITY
Start: 2022-02-15 | End: 1900-01-01

## 2022-02-16 ENCOUNTER — APPOINTMENT (OUTPATIENT)
Dept: CARE COORDINATION | Facility: HOME HEALTH | Age: 72
End: 2022-02-16
Payer: MEDICARE

## 2022-02-16 VITALS
HEART RATE: 99 BPM | DIASTOLIC BLOOD PRESSURE: 72 MMHG | SYSTOLIC BLOOD PRESSURE: 104 MMHG | RESPIRATION RATE: 14 BRPM | OXYGEN SATURATION: 98 %

## 2022-02-16 DIAGNOSIS — Z95.1 PRESENCE OF AORTOCORONARY BYPASS GRAFT: ICD-10-CM

## 2022-02-16 DIAGNOSIS — I25.10 ATHEROSCLEROTIC HEART DISEASE OF NATIVE CORONARY ARTERY W/OUT ANGINA PECTORIS: ICD-10-CM

## 2022-02-16 DIAGNOSIS — F17.200 NICOTINE DEPENDENCE, UNSPECIFIED, UNCOMPLICATED: ICD-10-CM

## 2022-02-16 PROCEDURE — 99024 POSTOP FOLLOW-UP VISIT: CPT

## 2022-02-16 NOTE — PHYSICAL EXAM
[] : no respiratory distress [Respiration, Rhythm And Depth] : normal respiratory rhythm and effort [Exaggerated Use Of Accessory Muscles For Inspiration] : no accessory muscle use [Auscultation Breath Sounds / Voice Sounds] : lungs were clear to auscultation bilaterally [Heart Sounds] : normal S1 and S2 [Apical Impulse] : the apical impulse was normal [Murmurs] : no murmurs [1+] : left 1+ [Bowel Sounds] : normal bowel sounds [Abdomen Soft] : soft [Abdomen Tenderness] : non-tender [Abnormal Walk] : normal gait [Oriented To Time, Place, And Person] : oriented to person, place, and time [Affect] : the affect was normal [FreeTextEntry1] : Left mni thoracotomy without erythema, warmth or drainage, with edges well-approximated. CT sites without drainage. Tie downs visualized.

## 2022-02-16 NOTE — HISTORY OF PRESENT ILLNESS
[FreeTextEntry1] : This pt. is enrolled in the [facility] Your [facility] (FYH) program through [employer]. Home visit made to Mr. ROSENBERG, a pt. of Dr. Tom astudillo/brant MAJOR on 2/9. Discharged home from [facility].\par \par Pt. seen for post-hospitalization transitional care management and post-surgical follow-up.  ID # [id number] utilized during encounter. Arrived to pt. home. Pt. appears well, in no distress, ambulating without compromise. Pt missing Aspirin 81 mg - otherwise has all medications per DC instructions and is taking them as prescribed. Denies fever, SOB, CP or palpitations.

## 2022-02-16 NOTE — ASSESSMENT
[FreeTextEntry1] : Pt. is a 71 year old male with PMHx of CHF (EF 25-30%) and prediabetes (A1c 5.9) who is s/p MIDCAB w/ Dr. Santos on 2/9. Postop course unremarkable. \par \par Pt. appears clinically stable. \par \par \par

## 2022-02-16 NOTE — REVIEW OF SYSTEMS
[SOB on Exertion] : shortness of breath during exertion [Fever] : no fever [Feeling Poorly] : not feeling poorly [Heart Rate Is Slow] : the heart rate was not slow [Heart Rate Is Fast] : the heart rate was not fast [Chest Pain] : no chest pain [Palpitations] : no palpitations [Lower Ext Edema] : no extremity edema [Shortness Of Breath] : no shortness of breath [Wheezing] : no wheezing [Cough] : no cough [Abdominal Pain] : no abdominal pain [Constipation] : no constipation [Dizziness] : no dizziness

## 2022-02-17 ENCOUNTER — TRANSCRIPTION ENCOUNTER (OUTPATIENT)
Age: 72
End: 2022-02-17

## 2022-02-18 ENCOUNTER — NON-APPOINTMENT (OUTPATIENT)
Age: 72
End: 2022-02-18

## 2022-02-18 RX ORDER — FUROSEMIDE 20 MG/1
20 TABLET ORAL DAILY
Qty: 14 | Refills: 0 | Status: ACTIVE | COMMUNITY
Start: 2022-02-15 | End: 1900-01-01

## 2022-02-22 ENCOUNTER — APPOINTMENT (OUTPATIENT)
Dept: CARDIOTHORACIC SURGERY | Facility: CLINIC | Age: 72
End: 2022-02-22

## 2022-02-22 DIAGNOSIS — I42.0 DILATED CARDIOMYOPATHY: ICD-10-CM

## 2022-02-22 DIAGNOSIS — R63.6 UNDERWEIGHT: ICD-10-CM

## 2022-02-22 DIAGNOSIS — F17.210 NICOTINE DEPENDENCE, CIGARETTES, UNCOMPLICATED: ICD-10-CM

## 2022-02-22 DIAGNOSIS — J44.9 CHRONIC OBSTRUCTIVE PULMONARY DISEASE, UNSPECIFIED: ICD-10-CM

## 2022-02-22 DIAGNOSIS — I25.10 ATHEROSCLEROTIC HEART DISEASE OF NATIVE CORONARY ARTERY WITHOUT ANGINA PECTORIS: ICD-10-CM

## 2022-02-22 DIAGNOSIS — I50.43 ACUTE ON CHRONIC COMBINED SYSTOLIC (CONGESTIVE) AND DIASTOLIC (CONGESTIVE) HEART FAILURE: ICD-10-CM

## 2022-02-22 DIAGNOSIS — R73.03 PREDIABETES: ICD-10-CM

## 2022-02-23 RX ORDER — SODIUM CHLORIDE 9 MG/ML
3 INJECTION INTRAMUSCULAR; INTRAVENOUS; SUBCUTANEOUS EVERY 8 HOURS
Refills: 0 | Status: DISCONTINUED | OUTPATIENT
Start: 2022-02-24 | End: 2022-02-24

## 2022-02-23 NOTE — H&P ADULT - NSHPPHYSICALEXAM_GEN_ALL_CORE
GEN: NAD, looks comfortable  Psych: Mood appropriate  Neuro: A&Ox3.  No focal deficits.  Moving all extremities.   HEENT: No obvious abnormalities  CV: S1S2, regular, no murmurs appreciated.  No carotid bruits.  No JVD  Lungs: Clear B/L.  No wheezing, rales or rhonchi  ABD: Soft, non-tender, non-distended.  +Bowel sounds  EXT: Warm and well perfused.  No peripheral edema noted  Musculoskeletal: Moving all extremities with normal ROM, no joint swelling  PV: Pedal pulses palpable  Incision site: Left thoracotomy clean and intact.  No drainage or sign of infection. VITALS    Appearance: Normal	  HEENT:   Normal oral mucosa, PERRL, EOMI	  Neck: Supple, - JVD; Carotid Bruit   Cardiovascular: Normal S1 S2. No JVD. No murmurs.  Respiratory: Lungs clear to auscultation B/L. No Rales, Rhonchi, Wheezing.	  Gastrointestinal:  Soft, non-tender, + BS.	  Skin: No rashes. No ecchymoses. No cyanosis.  Extremities: Normal range of motion, no clubbing, cyanosis or edema.  Vascular: Peripheral pulses palpable 2+ bilaterally.  Neurologic: Non-focal.  Psychiatry: A & O x 3, mood & affect appropriate. Vital Signs  T(F): 98.7  HR: 85 (24 Feb 2022 00:45) (84 - 85)  BP: 126/86 (24 Feb 2022 00:45) (123/84 - 126/86)  BP(mean): 102 (24 Feb 2022 00:45) (98 - 102)  RR: 15  SpO2: 99% (24 Feb 2022 00:45) (96% - 99%)    Appearance: Normal	  HEENT: Normal oral mucosa. None verbal. 	  Neck: Supple, - JVD; Carotid Bruit   Cardiovascular: Normal S1 S2. No JVD. No murmurs.  Respiratory: Lungs clear to auscultation B/L. No Rales, Rhonchi, Wheezing.	  Gastrointestinal:  Soft, non-tender, + BS.	  Skin: No rashes. No ecchymoses. No cyanosis.  Extremities: Normal range of motion, no clubbing, cyanosis or edema.  Vascular: Peripheral pulses palpable 2+ bilaterally.  Neurologic: Weak b/L Left > Right.  Psychiatry: A & O x 3, mood & affect appropriate. Vital Signs  T(F): 98.7  HR: 85 (24 Feb 2022 00:45) (84 - 85)  BP: 126/86 (24 Feb 2022 00:45) (123/84 - 126/86)  BP(mean): 102 (24 Feb 2022 00:45) (98 - 102)  RR: 15  SpO2: 99% (24 Feb 2022 00:45) (96% - 99%)    Appearance: Normal	  HEENT: Normal oral mucosa. None verbal. 	  Neck: Supple, - JVD; Carotid Bruit   Cardiovascular: Normal S1 S2. No JVD. No murmurs.  Respiratory: Lungs clear to auscultation B/L. No Rales, Rhonchi, Wheezing.	  Gastrointestinal:  Soft, non-tender, + BS.	  Skin: No rashes. No ecchymoses. No cyanosis.  Extremities: Decreased range of motion, no clubbing, cyanosis or edema.  Vascular: Peripheral pulses palpable 2+ bilaterally.  Neurologic: Weak b/L Left > Right.  Psychiatry: A & O x 0, mood & affect appropriate.

## 2022-02-23 NOTE — H&P ADULT - NSHPREVIEWOFSYSTEMS_GEN_ALL_CORE
Review of Systems  CONSTITUTIONAL:  Denies Fevers / chills, sweats, fatigue, weight loss, weight gain                                      NEURO:  Denies parethesias, seizures, syncope, confusion                                                                                EYES:  Denies Blurry vision, discharge, pain, loss of vision                                                                                    ENMT:  Denies Difficulty hearing, vertigo, dysphagia, epistaxis, recent dental work                                       CV:  Denies Chest pain, palpitations, RUIZ, orthopnea                                                                                          RESPIRATORY:  Denies Wheezing, SOB, cough / sputum, hemoptysis                                                                GI:  Denies Nausea, vomiting, diarrhea, constipation, melena, difficulty swallowing                                               : Denies Hematuria, dysuria, urgency, incontinence                                                                                         MUSCULOSKELETAL:  Denies arthritis, joint swelling, muscle weakness                                                             SKIN/BREAST:  Denies rash, itching, hair loss, masses                                                                                            PSYCH:  Denies depression, anxiety, suicidal ideation                                                                                               HEME/LYMPH:  Denies bruises easily, enlarged lymph nodes, tender lymph nodes                                        ENDOCRINE:  Denies cold intolerance, heat intolerance, polydipsia CONSTITUTIONAL: No fevers / chills, sweats, fatigue, weight loss, weight gain  NEUROLOGICAL: No parathesias, seizures, syncope, confusion  EYES: No blurry vision, discharge, pain, loss of vision  ENMT: No difficulty hearing, vertigo, dysphagia, epistaxis, recent dental work  CV: No chest pain, palpitations, RUIZ, orthopnea  RESPIRATORY:  No wheezing, SOB, cough / sputum, hemoptysis  GI: No nausea, vomiting, diarrhea, constipation, melena  : No hematuria, dysuria, urgency, incontinence  MUSCULOSKELETAL: No arthritis, joint swelling, muscle weakness  SKIN/BREAST: No rash, itching, hair loss, masses  PSYCHIATRY: No depression, anxiety, suicidal ideation  HEMATOLOGY:  No bruising easily, enlarged lymph nodes, tender lymph nodes  ENDOCRINE: No cold intolerance, heat intolerance, polydipsia Patient is none verbal.

## 2022-02-23 NOTE — H&P ADULT - HISTORY OF PRESENT ILLNESS
This is a 70 y/o Portuguese speaking male, smoker (very recently quit, smoked x 40 years) with PMHx of CHF (EF 25-30%) and prediabetes (A1c 5.9) who is now s/p robotic MIDCAB with Dr. Santos on 2/9/2022.  Post op he was extubated on POD 0, started on beta blockers by POD 2 and transferred to telemetry.  By POD 4 he was ambulating independently, on room air, having normal BMs, tolerating PO diet with surgical pain under control.  He was discharged home on POD 4.  While at home...     This is a 71 year old Slovak speaking male, smoker (very recently quit, smoked x 40 years) with PMHx of CHF (EF 25-30%) and prediabetes (A1c 5.9) who is now s/p robotic MIDCAB with Dr. Santos on 02/09/2022.  Post OR he was extubated on POD 0, started on beta blockers by POD 2 and transferred to telemetry.  By POD 4 he was ambulating independently, on room air, having normal BM's, tolerating PO diet with surgical pain under control. He was discharged home on POD 4.  While at home     PMH/PSH:   See HPI.    SH:  40 years of 1ppd smoking, stopped 01/2022  Alcohol: 1 drink per night, stopped 01/2022  Elicit drugs: No  Occupation: ?  Home: Yes    FH:  N/A    Allergies:   See above.     Home Rx.:   See below. This is a 71 year old Uzbek speaking male, smoker (very recently quit, smoked x 40 years) with PMHx of CHF (EF 25-30%) and prediabetes (A1c 5.9) who is now s/p robotic MIDCAB with Dr. Santos on 02/09/2022.  Post OR he was extubated on POD 0, started on beta blockers by POD 2 and transferred to telemetry.  By POD 4 he was ambulating independently, on room air, having normal BM's, tolerating PO diet with surgical pain under control. He was discharged home on POD 4.  On 02/20/2022 patient presented to OSH with AMS. Work up included CT without, CTA Head and Neck and MRI Jerry showing acute MCA CVA, b/l acute inracts  with the largest in the right basal ganglia and insular region. EEG negative for seizure. TTE = ? PFO. Patient transfer to [facility] for further MGMT and work up.      PMH/PSH:   See HPI.    SH:  40 years of 1ppd smoking, stopped 01/2022  Alcohol: 1 drink per night, stopped 01/2022  Elicit drugs: No  Occupation: ?  Home: Yes    FH:  N/A    Allergies:   See above.     Home Rx.:   See below.

## 2022-02-23 NOTE — H&P ADULT - ASSESSMENT
Problem 1.  CVA- Admit to [facility] ([facility] ICU) for closer monitoring and further evaluation/treatment of his stroke given the recent CABG.  F/U admission labs, CXR, EKG, T&S x 2, COVID swab.  CT Angio head done at OSH, results above.  Dr. Lucia's team to consult.     Problem 2.  CAD.  Recent MIDCAB; Continue w/ BB, statin as tolerated.  ?Hold anti-platelets until discuss w/ Neuro team.    Problem 3.  HFrEF; Lasix as needed.  Continue with BB as tolerated.      Problem 4.  Pre Diabetes.  ISS while in-house.  Follow finger sticks for now.      ICU care for now on [facility] This is a 71 year old Croatian speaking male, smoker (very recently quit, smoked x 40 years) with PMHx of CHF (EF 25-30%) and prediabetes (A1c 5.9) who is now s/p robotic MIDCAB with Dr. Santos on 02/09/2022.  Post OR he was extubated on POD 0, started on beta blockers by POD 2 and transferred to telemetry.  By POD 4 he was ambulating independently, on room air, having normal BM's, tolerating PO diet with surgical pain under control. He was discharged home on POD 4.     Neurovascular: CVA   -Admit to [facility] ([facility] ICU) for closer monitoring and further evaluation/treatment of his stroke given the recent CABG.  F/U admission studies.  CTA Head done at OSH, results above. Dr. Lucia's team to consult.  PRN's.    Cardiovascular: Hemodynamically stable. HR controlled. CHF (EF 25-30%). s/p robotic MIDCAB with Dr. Santos on 02/09/2022.   -Home Rx..      Respiratory: 02 Sat = 98% on RA.  -If on oxygen wean to RA from for O2 Sat > 93%.  -Encourage C+DB and Use of IS 10x / hr while awake.  -CXR.    GI: Stable.  -NPO.  -PPX.    Renal / :  -Monitor renal function.  -Monitor I/O's.    Endocrine: Prediabetes (A1c 5.9)   -SSI.    Hematologic:  -CBC.  -Coagulation Panel.  -T/Sx2.    ID:  -Temperature.  -CBC.  -Observe for SIRS/Sepsis Syndrome.    Prophylaxis:  -DVT prophylaxis with 5000 SubQ Heparin q8h.  -SCD's.    Disposition:  -9[facility] for frequent monitoring.  This is a 71 year old Uzbek speaking male, smoker (very recently quit, smoked x 40 years) with PMHx of CHF (EF 25-30%) and prediabetes (A1c 5.9) who is now s/p robotic MIDCAB with Dr. Santos on 02/09/2022.  Post OR he was extubated on POD 0, started on beta blockers by POD 2 and transferred to telemetry.  By POD 4 he was ambulating independently, on room air, having normal BM's, tolerating PO diet with surgical pain under control. He was discharged home on POD 4.  On 02/20/2022 patient presented to OSH with AMS. Work up included CT without, CTA Head and Neck and MRI Jerry showing acute MCA CVA, b/l acute inracts  with the largest in the right basal ganglia and insular region. EEG negative for seizure. TTE = ? PFO. Patient transfer to [facility] for further MGMT and work up.      Neurovascular: CVA   -Admit to [facility] ([facility] ICU) for closer monitoring and further evaluation/treatment of his stroke given the recent CABG.  F/U admission studies.  CTA Head done at OSH, results above. Dr. Lucia's team to consult.  PRN's.    Cardiovascular: Hemodynamically stable. HR controlled. CHF (EF 25-30%). s/p robotic MIDCAB with Dr. Santos on 02/09/2022.   -Home Rx..      Respiratory: 02 Sat = 98% on RA.  -If on oxygen wean to RA from for O2 Sat > 93%.  -Encourage C+DB and Use of IS 10x / hr while awake.  -CXR.    GI: Stable.  -NPO.  -PPX.    Renal / :  -Monitor renal function.  -Monitor I/O's.    Endocrine: Prediabetes (A1c 5.9)   -SSI.    Hematologic:  -CBC.  -Coagulation Panel.  -T/Sx2.    ID:  -Temperature.  -CBC.  -Observe for SIRS/Sepsis Syndrome.    Prophylaxis:  -DVT prophylaxis with 5000 SubQ Heparin q8h.  -SCD's.    Disposition:  -9L for frequent monitoring.  This is a 71 year old Swedish speaking male, smoker (very recently quit, smoked x 40 years) with PMHx of CHF (EF 25-30%) and prediabetes (A1c 5.9) who is now s/p robotic MIDCAB with Dr. Santos on 02/09/2022.  Post OR he was extubated on POD 0, started on beta blockers by POD 2 and transferred to telemetry.  By POD 4 he was ambulating independently, on room air, having normal BM's, tolerating PO diet with surgical pain under control. He was discharged home on POD 4.  On 02/20/2022 patient presented to OSH with AMS. Work up included CT without, CTA Head and Neck and MRI Jerry showing acute MCA CVA, b/l acute inracts  with the largest in the right basal ganglia and insular region. EEG negative for seizure. TTE = ? PFO. Patient transfer to [facility] for further MGMT and work up.      Neurovascular: CVA   -Admit to [facility] ([facility] ICU) for closer monitoring and further evaluation/treatment of his stroke given the recent CABG.  F/U admission studies.  CTA Head done at OSH, results above. Dr. Lucia's team to consult.  PRN's.    Cardiovascular: Hemodynamically stable. HR controlled. CHF (EF 25-30%). s/p robotic MIDCAB with Dr. Santos on 02/09/2022.   -Home Rx..      Respiratory: 02 Sat = 98% on RA.  -If on oxygen wean to RA from for O2 Sat > 93%.  -Encourage C+DB and Use of IS 10x / hr while awake.  -CXR.    GI: Stable.  -NPO unitl S/S evaluation. Will give IV and ME Rx.. for now.   -PPX.    Renal / :  -Monitor renal function.  -Monitor I/O's.    Endocrine: Prediabetes (A1c 5.9)   -SSI.    Hematologic:  -CBC.  -Coagulation Panel.  -T/Sx2.    ID:  -Temperature.  -CBC.  -Observe for SIRS/Sepsis Syndrome.    Prophylaxis:  -DVT prophylaxis with 5000 SubQ Heparin q8h.  -SCD's.    Disposition:  -9L for frequent monitoring.

## 2022-02-24 ENCOUNTER — INPATIENT (INPATIENT)
Facility: HOSPITAL | Age: 72
LOS: 11 days | Discharge: HOSPICE | DRG: 23 | End: 2022-03-08
Attending: PSYCHIATRY & NEUROLOGY | Admitting: THORACIC SURGERY (CARDIOTHORACIC VASCULAR SURGERY)
Payer: MEDICARE

## 2022-02-24 VITALS
HEART RATE: 84 BPM | SYSTOLIC BLOOD PRESSURE: 123 MMHG | OXYGEN SATURATION: 96 % | DIASTOLIC BLOOD PRESSURE: 84 MMHG | TEMPERATURE: 97 F | RESPIRATION RATE: 20 BRPM | WEIGHT: 87.74 LBS

## 2022-02-24 DIAGNOSIS — I65.02 OCCLUSION AND STENOSIS OF LEFT VERTEBRAL ARTERY: ICD-10-CM

## 2022-02-24 DIAGNOSIS — R73.03 PREDIABETES: ICD-10-CM

## 2022-02-24 DIAGNOSIS — C16.9 MALIGNANT NEOPLASM OF STOMACH, UNSPECIFIED: ICD-10-CM

## 2022-02-24 DIAGNOSIS — K25.9 GASTRIC ULCER, UNSPECIFIED AS ACUTE OR CHRONIC, WITHOUT HEMORRHAGE OR PERFORATION: ICD-10-CM

## 2022-02-24 DIAGNOSIS — Z79.82 LONG TERM (CURRENT) USE OF ASPIRIN: ICD-10-CM

## 2022-02-24 DIAGNOSIS — R29.722 NIHSS SCORE 22: ICD-10-CM

## 2022-02-24 DIAGNOSIS — I50.22 CHRONIC SYSTOLIC (CONGESTIVE) HEART FAILURE: ICD-10-CM

## 2022-02-24 DIAGNOSIS — I63.89 OTHER CEREBRAL INFARCTION: ICD-10-CM

## 2022-02-24 DIAGNOSIS — I25.10 ATHEROSCLEROTIC HEART DISEASE OF NATIVE CORONARY ARTERY WITHOUT ANGINA PECTORIS: ICD-10-CM

## 2022-02-24 DIAGNOSIS — Z66 DO NOT RESUSCITATE: ICD-10-CM

## 2022-02-24 DIAGNOSIS — R29.810 FACIAL WEAKNESS: ICD-10-CM

## 2022-02-24 DIAGNOSIS — Z51.5 ENCOUNTER FOR PALLIATIVE CARE: ICD-10-CM

## 2022-02-24 DIAGNOSIS — R47.01 APHASIA: ICD-10-CM

## 2022-02-24 DIAGNOSIS — N32.89 OTHER SPECIFIED DISORDERS OF BLADDER: ICD-10-CM

## 2022-02-24 DIAGNOSIS — R13.10 DYSPHAGIA, UNSPECIFIED: ICD-10-CM

## 2022-02-24 DIAGNOSIS — G81.94 HEMIPLEGIA, UNSPECIFIED AFFECTING LEFT NONDOMINANT SIDE: ICD-10-CM

## 2022-02-24 DIAGNOSIS — Z95.1 PRESENCE OF AORTOCORONARY BYPASS GRAFT: ICD-10-CM

## 2022-02-24 DIAGNOSIS — I63.411 CEREBRAL INFARCTION DUE TO EMBOLISM OF RIGHT MIDDLE CEREBRAL ARTERY: ICD-10-CM

## 2022-02-24 DIAGNOSIS — Z87.891 PERSONAL HISTORY OF NICOTINE DEPENDENCE: ICD-10-CM

## 2022-02-24 DIAGNOSIS — I48.0 PAROXYSMAL ATRIAL FIBRILLATION: ICD-10-CM

## 2022-02-24 DIAGNOSIS — I65.23 OCCLUSION AND STENOSIS OF BILATERAL CAROTID ARTERIES: ICD-10-CM

## 2022-02-24 DIAGNOSIS — E43 UNSPECIFIED SEVERE PROTEIN-CALORIE MALNUTRITION: ICD-10-CM

## 2022-02-24 DIAGNOSIS — J44.9 CHRONIC OBSTRUCTIVE PULMONARY DISEASE, UNSPECIFIED: ICD-10-CM

## 2022-02-24 DIAGNOSIS — E87.0 HYPEROSMOLALITY AND HYPERNATREMIA: ICD-10-CM

## 2022-02-24 DIAGNOSIS — G93.41 METABOLIC ENCEPHALOPATHY: ICD-10-CM

## 2022-02-24 DIAGNOSIS — I25.5 ISCHEMIC CARDIOMYOPATHY: ICD-10-CM

## 2022-02-24 DIAGNOSIS — I11.0 HYPERTENSIVE HEART DISEASE WITH HEART FAILURE: ICD-10-CM

## 2022-02-24 DIAGNOSIS — R29.703 NIHSS SCORE 3: ICD-10-CM

## 2022-02-24 LAB
ALBUMIN SERPL ELPH-MCNC: 3.6 G/DL — SIGNIFICANT CHANGE UP (ref 3.3–5)
ALP SERPL-CCNC: 73 U/L — SIGNIFICANT CHANGE UP (ref 40–120)
ALT FLD-CCNC: 11 U/L — SIGNIFICANT CHANGE UP (ref 10–45)
ANION GAP SERPL CALC-SCNC: 14 MMOL/L — SIGNIFICANT CHANGE UP (ref 5–17)
ANION GAP SERPL CALC-SCNC: 16 MMOL/L — SIGNIFICANT CHANGE UP (ref 5–17)
APPEARANCE UR: ABNORMAL
APTT BLD: 31.8 SEC — SIGNIFICANT CHANGE UP (ref 27.5–35.5)
AST SERPL-CCNC: 20 U/L — SIGNIFICANT CHANGE UP (ref 10–40)
BACTERIA # UR AUTO: PRESENT /HPF
BASOPHILS # BLD AUTO: 0.07 K/UL — SIGNIFICANT CHANGE UP (ref 0–0.2)
BASOPHILS NFR BLD AUTO: 1 % — SIGNIFICANT CHANGE UP (ref 0–2)
BILIRUB SERPL-MCNC: 0.8 MG/DL — SIGNIFICANT CHANGE UP (ref 0.2–1.2)
BILIRUB UR-MCNC: ABNORMAL
BLD GP AB SCN SERPL QL: POSITIVE — SIGNIFICANT CHANGE UP
BLD GP AB SCN SERPL QL: POSITIVE — SIGNIFICANT CHANGE UP
BUN SERPL-MCNC: 27 MG/DL — HIGH (ref 7–23)
BUN SERPL-MCNC: 31 MG/DL — HIGH (ref 7–23)
CALCIUM SERPL-MCNC: 9.2 MG/DL — SIGNIFICANT CHANGE UP (ref 8.4–10.5)
CALCIUM SERPL-MCNC: 9.4 MG/DL — SIGNIFICANT CHANGE UP (ref 8.4–10.5)
CHLORIDE SERPL-SCNC: 113 MMOL/L — HIGH (ref 96–108)
CHLORIDE SERPL-SCNC: 115 MMOL/L — HIGH (ref 96–108)
CO2 SERPL-SCNC: 21 MMOL/L — LOW (ref 22–31)
CO2 SERPL-SCNC: 22 MMOL/L — SIGNIFICANT CHANGE UP (ref 22–31)
COLOR SPEC: ABNORMAL
CREAT SERPL-MCNC: 0.89 MG/DL — SIGNIFICANT CHANGE UP (ref 0.5–1.3)
CREAT SERPL-MCNC: 0.91 MG/DL — SIGNIFICANT CHANGE UP (ref 0.5–1.3)
DIFF PNL FLD: ABNORMAL
EOSINOPHIL # BLD AUTO: 0.31 K/UL — SIGNIFICANT CHANGE UP (ref 0–0.5)
EOSINOPHIL NFR BLD AUTO: 4.5 % — SIGNIFICANT CHANGE UP (ref 0–6)
EPI CELLS # UR: SIGNIFICANT CHANGE UP /HPF (ref 0–5)
GLUCOSE BLDC GLUCOMTR-MCNC: 102 MG/DL — HIGH (ref 70–99)
GLUCOSE SERPL-MCNC: 68 MG/DL — LOW (ref 70–99)
GLUCOSE SERPL-MCNC: 78 MG/DL — SIGNIFICANT CHANGE UP (ref 70–99)
GLUCOSE UR QL: NEGATIVE — SIGNIFICANT CHANGE UP
HCT VFR BLD CALC: 39.2 % — SIGNIFICANT CHANGE UP (ref 39–50)
HCT VFR BLD CALC: 41.2 % — SIGNIFICANT CHANGE UP (ref 39–50)
HGB BLD-MCNC: 12.4 G/DL — LOW (ref 13–17)
HGB BLD-MCNC: 12.5 G/DL — LOW (ref 13–17)
IMM GRANULOCYTES NFR BLD AUTO: 0.1 % — SIGNIFICANT CHANGE UP (ref 0–1.5)
INR BLD: 1.12 — SIGNIFICANT CHANGE UP (ref 0.88–1.16)
KETONES UR-MCNC: ABNORMAL MG/DL
LEUKOCYTE ESTERASE UR-ACNC: NEGATIVE — SIGNIFICANT CHANGE UP
LYMPHOCYTES # BLD AUTO: 0.99 K/UL — LOW (ref 1–3.3)
LYMPHOCYTES # BLD AUTO: 14.3 % — SIGNIFICANT CHANGE UP (ref 13–44)
MAGNESIUM SERPL-MCNC: 2.4 MG/DL — SIGNIFICANT CHANGE UP (ref 1.6–2.6)
MAGNESIUM SERPL-MCNC: 2.4 MG/DL — SIGNIFICANT CHANGE UP (ref 1.6–2.6)
MCHC RBC-ENTMCNC: 30 PG — SIGNIFICANT CHANGE UP (ref 27–34)
MCHC RBC-ENTMCNC: 30.3 GM/DL — LOW (ref 32–36)
MCHC RBC-ENTMCNC: 30.7 PG — SIGNIFICANT CHANGE UP (ref 27–34)
MCHC RBC-ENTMCNC: 31.6 GM/DL — LOW (ref 32–36)
MCV RBC AUTO: 97 FL — SIGNIFICANT CHANGE UP (ref 80–100)
MCV RBC AUTO: 99 FL — SIGNIFICANT CHANGE UP (ref 80–100)
MONOCYTES # BLD AUTO: 0.64 K/UL — SIGNIFICANT CHANGE UP (ref 0–0.9)
MONOCYTES NFR BLD AUTO: 9.2 % — SIGNIFICANT CHANGE UP (ref 2–14)
NEUTROPHILS # BLD AUTO: 4.9 K/UL — SIGNIFICANT CHANGE UP (ref 1.8–7.4)
NEUTROPHILS NFR BLD AUTO: 70.9 % — SIGNIFICANT CHANGE UP (ref 43–77)
NITRITE UR-MCNC: POSITIVE
NRBC # BLD: 0 /100 WBCS — SIGNIFICANT CHANGE UP (ref 0–0)
NRBC # BLD: 0 /100 WBCS — SIGNIFICANT CHANGE UP (ref 0–0)
PH UR: 5 — SIGNIFICANT CHANGE UP (ref 5–8)
PHOSPHATE SERPL-MCNC: 3.9 MG/DL — SIGNIFICANT CHANGE UP (ref 2.5–4.5)
PHOSPHATE SERPL-MCNC: 4.3 MG/DL — SIGNIFICANT CHANGE UP (ref 2.5–4.5)
PLATELET # BLD AUTO: 321 K/UL — SIGNIFICANT CHANGE UP (ref 150–400)
PLATELET # BLD AUTO: 322 K/UL — SIGNIFICANT CHANGE UP (ref 150–400)
POTASSIUM SERPL-MCNC: 3.5 MMOL/L — SIGNIFICANT CHANGE UP (ref 3.5–5.3)
POTASSIUM SERPL-MCNC: 3.6 MMOL/L — SIGNIFICANT CHANGE UP (ref 3.5–5.3)
POTASSIUM SERPL-SCNC: 3.5 MMOL/L — SIGNIFICANT CHANGE UP (ref 3.5–5.3)
POTASSIUM SERPL-SCNC: 3.6 MMOL/L — SIGNIFICANT CHANGE UP (ref 3.5–5.3)
PROT SERPL-MCNC: 7.2 G/DL — SIGNIFICANT CHANGE UP (ref 6–8.3)
PROT UR-MCNC: 100 MG/DL
PROTHROM AB SERPL-ACNC: 13.3 SEC — SIGNIFICANT CHANGE UP (ref 10.5–13.4)
RBC # BLD: 4.04 M/UL — LOW (ref 4.2–5.8)
RBC # BLD: 4.16 M/UL — LOW (ref 4.2–5.8)
RBC # FLD: 14.7 % — HIGH (ref 10.3–14.5)
RBC # FLD: 14.7 % — HIGH (ref 10.3–14.5)
RBC CASTS # UR COMP ASSIST: > 10 /HPF
RH IG SCN BLD-IMP: POSITIVE — SIGNIFICANT CHANGE UP
RH IG SCN BLD-IMP: POSITIVE — SIGNIFICANT CHANGE UP
SARS-COV-2 RNA SPEC QL NAA+PROBE: SIGNIFICANT CHANGE UP
SODIUM SERPL-SCNC: 150 MMOL/L — HIGH (ref 135–145)
SODIUM SERPL-SCNC: 151 MMOL/L — HIGH (ref 135–145)
SP GR SPEC: 1.01 — SIGNIFICANT CHANGE UP (ref 1–1.03)
UROBILINOGEN FLD QL: 0.2 E.U./DL — SIGNIFICANT CHANGE UP
WBC # BLD: 6.56 K/UL — SIGNIFICANT CHANGE UP (ref 3.8–10.5)
WBC # BLD: 6.92 K/UL — SIGNIFICANT CHANGE UP (ref 3.8–10.5)
WBC # FLD AUTO: 6.56 K/UL — SIGNIFICANT CHANGE UP (ref 3.8–10.5)
WBC # FLD AUTO: 6.92 K/UL — SIGNIFICANT CHANGE UP (ref 3.8–10.5)
WBC UR QL: < 5 /HPF — SIGNIFICANT CHANGE UP

## 2022-02-24 PROCEDURE — 82247 BILIRUBIN TOTAL: CPT

## 2022-02-24 PROCEDURE — 83880 ASSAY OF NATRIURETIC PEPTIDE: CPT

## 2022-02-24 PROCEDURE — C1889: CPT

## 2022-02-24 PROCEDURE — 82947 ASSAY GLUCOSE BLOOD QUANT: CPT

## 2022-02-24 PROCEDURE — 93005 ELECTROCARDIOGRAM TRACING: CPT

## 2022-02-24 PROCEDURE — 99221 1ST HOSP IP/OBS SF/LOW 40: CPT

## 2022-02-24 PROCEDURE — 93880 EXTRACRANIAL BILAT STUDY: CPT

## 2022-02-24 PROCEDURE — 86923 COMPATIBILITY TEST ELECTRIC: CPT

## 2022-02-24 PROCEDURE — 80048 BASIC METABOLIC PNL TOTAL CA: CPT

## 2022-02-24 PROCEDURE — 86891 AUTOLOGOUS BLOOD OP SALVAGE: CPT

## 2022-02-24 PROCEDURE — 85014 HEMATOCRIT: CPT

## 2022-02-24 PROCEDURE — 86850 RBC ANTIBODY SCREEN: CPT

## 2022-02-24 PROCEDURE — 85610 PROTHROMBIN TIME: CPT

## 2022-02-24 PROCEDURE — 82330 ASSAY OF CALCIUM: CPT

## 2022-02-24 PROCEDURE — 86077 PHYS BLOOD BANK SERV XMATCH: CPT

## 2022-02-24 PROCEDURE — 97161 PT EVAL LOW COMPLEX 20 MIN: CPT

## 2022-02-24 PROCEDURE — 71045 X-RAY EXAM CHEST 1 VIEW: CPT

## 2022-02-24 PROCEDURE — U0005: CPT

## 2022-02-24 PROCEDURE — 99222 1ST HOSP IP/OBS MODERATE 55: CPT

## 2022-02-24 PROCEDURE — 93010 ELECTROCARDIOGRAM REPORT: CPT

## 2022-02-24 PROCEDURE — 74176 CT ABD & PELVIS W/O CONTRAST: CPT

## 2022-02-24 PROCEDURE — 83735 ASSAY OF MAGNESIUM: CPT

## 2022-02-24 PROCEDURE — 71045 X-RAY EXAM CHEST 1 VIEW: CPT | Mod: 26

## 2022-02-24 PROCEDURE — 94002 VENT MGMT INPAT INIT DAY: CPT

## 2022-02-24 PROCEDURE — 84443 ASSAY THYROID STIM HORMONE: CPT

## 2022-02-24 PROCEDURE — 81001 URINALYSIS AUTO W/SCOPE: CPT

## 2022-02-24 PROCEDURE — 86901 BLOOD TYPING SEROLOGIC RH(D): CPT

## 2022-02-24 PROCEDURE — U0003: CPT

## 2022-02-24 PROCEDURE — P9045: CPT

## 2022-02-24 PROCEDURE — 80053 COMPREHEN METABOLIC PANEL: CPT

## 2022-02-24 PROCEDURE — 94150 VITAL CAPACITY TEST: CPT

## 2022-02-24 PROCEDURE — 84295 ASSAY OF SERUM SODIUM: CPT

## 2022-02-24 PROCEDURE — 86803 HEPATITIS C AB TEST: CPT

## 2022-02-24 PROCEDURE — 85730 THROMBOPLASTIN TIME PARTIAL: CPT

## 2022-02-24 PROCEDURE — 83036 HEMOGLOBIN GLYCOSYLATED A1C: CPT

## 2022-02-24 PROCEDURE — P9047: CPT

## 2022-02-24 PROCEDURE — S2900: CPT

## 2022-02-24 PROCEDURE — 87086 URINE CULTURE/COLONY COUNT: CPT

## 2022-02-24 PROCEDURE — 86900 BLOOD TYPING SEROLOGIC ABO: CPT

## 2022-02-24 PROCEDURE — 84100 ASSAY OF PHOSPHORUS: CPT

## 2022-02-24 PROCEDURE — 84132 ASSAY OF SERUM POTASSIUM: CPT

## 2022-02-24 PROCEDURE — 80061 LIPID PANEL: CPT

## 2022-02-24 PROCEDURE — 85027 COMPLETE CBC AUTOMATED: CPT

## 2022-02-24 PROCEDURE — 36415 COLL VENOUS BLD VENIPUNCTURE: CPT

## 2022-02-24 PROCEDURE — 71046 X-RAY EXAM CHEST 2 VIEWS: CPT

## 2022-02-24 PROCEDURE — C8929: CPT

## 2022-02-24 PROCEDURE — 82248 BILIRUBIN DIRECT: CPT

## 2022-02-24 PROCEDURE — 93306 TTE W/DOPPLER COMPLETE: CPT | Mod: 26

## 2022-02-24 PROCEDURE — 82962 GLUCOSE BLOOD TEST: CPT

## 2022-02-24 PROCEDURE — 82803 BLOOD GASES ANY COMBINATION: CPT

## 2022-02-24 PROCEDURE — 85025 COMPLETE CBC W/AUTO DIFF WBC: CPT

## 2022-02-24 RX ORDER — HEPARIN SODIUM 5000 [USP'U]/ML
5000 INJECTION INTRAVENOUS; SUBCUTANEOUS EVERY 12 HOURS
Refills: 0 | Status: DISCONTINUED | OUTPATIENT
Start: 2022-02-25 | End: 2022-03-03

## 2022-02-24 RX ORDER — POLYETHYLENE GLYCOL 3350 17 G/17G
17 POWDER, FOR SOLUTION ORAL DAILY
Refills: 0 | Status: DISCONTINUED | OUTPATIENT
Start: 2022-02-24 | End: 2022-03-03

## 2022-02-24 RX ORDER — FUROSEMIDE 40 MG
10 TABLET ORAL DAILY
Refills: 0 | Status: DISCONTINUED | OUTPATIENT
Start: 2022-02-24 | End: 2022-02-25

## 2022-02-24 RX ORDER — PANTOPRAZOLE SODIUM 20 MG/1
40 TABLET, DELAYED RELEASE ORAL EVERY 24 HOURS
Refills: 0 | Status: DISCONTINUED | OUTPATIENT
Start: 2022-02-24 | End: 2022-02-24

## 2022-02-24 RX ORDER — METOPROLOL TARTRATE 50 MG
2.5 TABLET ORAL EVERY 6 HOURS
Refills: 0 | Status: DISCONTINUED | OUTPATIENT
Start: 2022-02-24 | End: 2022-02-24

## 2022-02-24 RX ORDER — ATORVASTATIN CALCIUM 80 MG/1
80 TABLET, FILM COATED ORAL AT BEDTIME
Refills: 0 | Status: DISCONTINUED | OUTPATIENT
Start: 2022-02-24 | End: 2022-03-03

## 2022-02-24 RX ORDER — POTASSIUM CHLORIDE 20 MEQ
40 PACKET (EA) ORAL ONCE
Refills: 0 | Status: COMPLETED | OUTPATIENT
Start: 2022-02-24 | End: 2022-02-24

## 2022-02-24 RX ORDER — METOPROLOL TARTRATE 50 MG
5 TABLET ORAL ONCE
Refills: 0 | Status: COMPLETED | OUTPATIENT
Start: 2022-02-24 | End: 2022-02-24

## 2022-02-24 RX ORDER — ASPIRIN/CALCIUM CARB/MAGNESIUM 324 MG
300 TABLET ORAL DAILY
Refills: 0 | Status: DISCONTINUED | OUTPATIENT
Start: 2022-02-24 | End: 2022-02-24

## 2022-02-24 RX ORDER — SENNA PLUS 8.6 MG/1
2 TABLET ORAL AT BEDTIME
Refills: 0 | Status: DISCONTINUED | OUTPATIENT
Start: 2022-02-24 | End: 2022-03-03

## 2022-02-24 RX ORDER — PANTOPRAZOLE SODIUM 20 MG/1
40 TABLET, DELAYED RELEASE ORAL
Refills: 0 | Status: DISCONTINUED | OUTPATIENT
Start: 2022-02-24 | End: 2022-03-03

## 2022-02-24 RX ORDER — POTASSIUM CHLORIDE 20 MEQ
40 PACKET (EA) ORAL ONCE
Refills: 0 | Status: DISCONTINUED | OUTPATIENT
Start: 2022-02-24 | End: 2022-02-24

## 2022-02-24 RX ORDER — POTASSIUM CHLORIDE 20 MEQ
10 PACKET (EA) ORAL ONCE
Refills: 0 | Status: COMPLETED | OUTPATIENT
Start: 2022-02-24 | End: 2022-02-24

## 2022-02-24 RX ORDER — HEPARIN SODIUM 5000 [USP'U]/ML
5000 INJECTION INTRAVENOUS; SUBCUTANEOUS EVERY 8 HOURS
Refills: 0 | Status: DISCONTINUED | OUTPATIENT
Start: 2022-02-24 | End: 2022-02-24

## 2022-02-24 RX ORDER — ASPIRIN/CALCIUM CARB/MAGNESIUM 324 MG
81 TABLET ORAL DAILY
Refills: 0 | Status: DISCONTINUED | OUTPATIENT
Start: 2022-02-24 | End: 2022-03-03

## 2022-02-24 RX ORDER — METOPROLOL TARTRATE 50 MG
12.5 TABLET ORAL EVERY 12 HOURS
Refills: 0 | Status: DISCONTINUED | OUTPATIENT
Start: 2022-02-24 | End: 2022-02-25

## 2022-02-24 RX ADMIN — PANTOPRAZOLE SODIUM 40 MILLIGRAM(S): 20 TABLET, DELAYED RELEASE ORAL at 05:26

## 2022-02-24 RX ADMIN — Medication 10 MILLIGRAM(S): at 05:26

## 2022-02-24 RX ADMIN — SENNA PLUS 2 TABLET(S): 8.6 TABLET ORAL at 23:24

## 2022-02-24 RX ADMIN — Medication 40 MILLIEQUIVALENT(S): at 15:22

## 2022-02-24 RX ADMIN — HEPARIN SODIUM 5000 UNIT(S): 5000 INJECTION INTRAVENOUS; SUBCUTANEOUS at 15:26

## 2022-02-24 RX ADMIN — Medication 81 MILLIGRAM(S): at 15:23

## 2022-02-24 RX ADMIN — SODIUM CHLORIDE 3 MILLILITER(S): 9 INJECTION INTRAMUSCULAR; INTRAVENOUS; SUBCUTANEOUS at 15:14

## 2022-02-24 RX ADMIN — Medication 5 MILLIGRAM(S): at 19:06

## 2022-02-24 RX ADMIN — Medication 2.5 MILLIGRAM(S): at 01:25

## 2022-02-24 RX ADMIN — Medication 12.5 MILLIGRAM(S): at 18:21

## 2022-02-24 RX ADMIN — ATORVASTATIN CALCIUM 80 MILLIGRAM(S): 80 TABLET, FILM COATED ORAL at 23:00

## 2022-02-24 RX ADMIN — HEPARIN SODIUM 5000 UNIT(S): 5000 INJECTION INTRAVENOUS; SUBCUTANEOUS at 05:26

## 2022-02-24 RX ADMIN — Medication 100 MILLIEQUIVALENT(S): at 10:47

## 2022-02-24 RX ADMIN — SODIUM CHLORIDE 3 MILLILITER(S): 9 INJECTION INTRAMUSCULAR; INTRAVENOUS; SUBCUTANEOUS at 06:42

## 2022-02-24 RX ADMIN — Medication 2.5 MILLIGRAM(S): at 06:21

## 2022-02-24 NOTE — PATIENT PROFILE ADULT - FALL HARM RISK - HARM RISK INTERVENTIONS

## 2022-02-24 NOTE — PROGRESS NOTE ADULT - SUBJECTIVE AND OBJECTIVE BOX
: Attending note  Mr. 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  Because of his recent stroke leading to aphasia and the language barrier, we were unable to obtain a history directly from the patient.  No family members were available either in person or by telephone.    Urologically, the priority is to address the bladder tumor, which presumably is a urothelial carcinoma.  This should be scheduled through the Urology Clinic once he is discharged.  A urine culture and cytology should be sent in the interim.   Thank you.  MICHELE Rossi

## 2022-02-24 NOTE — CONSULT NOTE ADULT - SUBJECTIVE AND OBJECTIVE BOX
** TRANSFER ACCEPTANCE NOTE TO STROKE TELEMETRY **    HPI: 71 year old Montenegrin speaking male, smoker (very recently quit, smoked x 40 years) with PMHx of CHF (EF 25-30%) and prediabetes (A1c 5.9) who is now s/p robotic MIDCAB with Dr. Santos on 2022.  Post OR he was extubated on POD 0, started on beta blockers by POD 2 and transferred to telemetry.  By POD 4 he was ambulating independently, on room air, having normal BM's, tolerating PO diet with surgical pain under control. He was discharged home on POD 4.  On 2022 patient presented to OSH with AMS.  CTH left caudate/basal ganglia age indeterminate infarct   CTA head/neck showed focal right MCA M2 branch stenosis secondary to high grade near total occlusion versus focal occlusion, with occlusion of a right MCA anterior distal m2/M3 branch and posterior M2 branch narrowing with somewhat beaded appearance. Occlusion of left vertebral artery within V1 cannot be excluded. 50% left ICA stenosis. mild right ICA stenosis. 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    MRI Brain showing scattered bilateral acute/subacute infarctions, largest in the right basal ganglia and insular region, as well as in bilateral frontal lobes, left basal ganglia, and right parietal lobe.    EEG was negative for seizure. TTE showed ? PFO at OSH though ECHO from [facility] on prior admission did not show PFO. Cerebral angiogram done as well at OSH showing moderate stenosis of b/l ICA's and b/l mild stenosis at ICA origins, and moderate to severe stenosis of left subclavian artery.   Patient was transfered to [facility] for further MGMT and work up.       Montenegrin video  used at bedside    PAST MEDICAL & SURGICAL HISTORY:  Acute on chronic combined systolic and diastolic congestive heart failure    Prediabetes    Smoking hx        FAMILY HISTORY:      ROS:   Constitutional: No fever, weight loss or fatigue  Eyes: No eye pain, visual disturbances, or discharge  ENMT:  No difficulty hearing, tinnitus, vertigo; No sinus or throat pain  Neck: No pain or stiffness  Respiratory: No cough, wheezing, chills or hemoptysis  Cardiovascular: No chest pain, palpitations, shortness of breath, dizziness or leg swelling  Gastrointestinal: No abdominal pain. No nausea, vomiting or hematemesis; No diarrhea or constipation. Nohematochezia.  Genitourinary: No dysuria, frequency, hematuria or incontinence  Neurological: As per HPI  Skin: No itching, burning, rashes or lesions   Endocrine: No heat or cold intolerance; No hair loss  Musculoskeletal: No joint pain or swelling; No muscle, back or extremity pain  Psychiatric: No depression, anxiety, mood swings or difficulty sleeping  Heme/Lymph: No easy bruising or bleeding gums    T(C): 36.9 (22 @ 17:38), Max: 37.1 (22 @ 14:00)  HR: 128 (22 @ 18:50) (69 - 156)  BP: 114/87 (22 @ 18:50) (92/61 - 132/91)  RR: 15 (22 @ 15:00) (12 - 24)  SpO2: 98% (22 @ 18:50) (96% - 100%)    MEDICATION RECONCILIATION   MEDICATIONS  (STANDING):  aspirin  chewable 81 milliGRAM(s) Oral daily  atorvastatin 80 milliGRAM(s) Oral at bedtime  furosemide   Injectable 10 milliGRAM(s) IV Push daily  metoprolol tartrate 12.5 milliGRAM(s) Oral every 12 hours  pantoprazole    Tablet 40 milliGRAM(s) Oral before breakfast  senna 2 Tablet(s) Oral at bedtime    MEDICATIONS  (PRN):  polyethylene glycol 3350 17 Gram(s) Oral daily PRN Constipation    Allergies    No Known Allergies    Intolerances      Vital Signs Last 24 Hrs  T(C): 36.9 (2022 17:38), Max: 37.1 (2022 14:00)  T(F): 98.5 (2022 17:38), Max: 98.7 (2022 14:00)  HR: 128 (2022 18:50) (69 - 156)  BP: 114/87 (2022 18:50) (92/61 - 132/91)  BP(mean): 97 (2022 18:50) (72 - 108)  RR: 15 (2022 15:00) (12 - 24)  SpO2: 98% (2022 18:50) (96% - 100%)    Physical exam:  General: No acute distress, awake and alert  Cardiovascular: Regular rate and rhythm, no murmurs, rubs, or gallops. No bruits  Pulmonary: Anterior breath sounds clear bilaterally, no crackles or wheezing. No use of accessory muscles  GI: Abdomen soft, non-tender, non-distended    Neurologic:  -Mental status: Awake, alert, patient with very minimal speech output even with , will say no in English to some questions and counted fingers in Montenegrin. Does not answer orientation questions. Named a ballpoint pen. Speech is hoarse-sounding.  Follows simple commands.  -Cranial nerves:   II: Visual fields are full to confrontation.  III, IV, VI: Extraocular movements are intact without nystagmus. Pupils equally round and reactive to light  V:  Facial sensation V1-V3 equal and intact   VII: + left facial droop   VIII: Hearing is bilaterally intact to finger rub  IX, X: Uvula is midline and soft palate rises symmetrically  XI: Head turning and shoulder shrug are intact.  XII: Tongue protrudes midline  Motor: Normal bulk and tone. +left  pronator drift. left upper extremity 4+/5 , right upper extremity 5/5, bilateral lower extremities 5/5.  Rapid alternating movements intact and symmetric  Sensation: Intact to light touch bilaterally. No neglect or extinction on double simultaneous testing.  Coordination: No dysmetria on finger-to-nose and heel-to-shin bilaterally  Reflexes: Downgoing toes bilaterally     NIHSS: 4   Fingerstick Blood Glucose: CAPILLARY BLOOD GLUCOSE      POCT Blood Glucose.: 102 mg/dL (2022 15:33)    LABS:                        12.5   6.56  )-----------( 321      ( 2022 06:19 )             41.2     02-    150<H>  |  113<H>  |  31<H>  ----------------------------<  68<L>  3.5   |  21<L>  |  0.91    Ca    9.4      2022 06:19  Phos  4.3     -  Mg     2.4         TPro  7.2  /  Alb  3.6  /  TBili  0.8  /  DBili  x   /  AST  20  /  ALT  11  /  AlkPhos  73  -    PT/INR - ( 2022 00:56 )   PT: 13.3 sec;   INR: 1.12          PTT - ( 2022 00:56 )  PTT:31.8 sec      Urinalysis Basic - ( 2022 01:50 )    Color: Orange / Appearance: SL Cloudy / S.015 / pH: x  Gluc: x / Ketone: Trace mg/dL  / Bili: Small / Urobili: 0.2 E.U./dL   Blood: x / Protein: 100 mg/dL / Nitrite: POSITIVE   Leuk Esterase: NEGATIVE / RBC: > 10 /HPF / WBC < 5 /HPF   Sq Epi: x / Non Sq Epi: 0-5 /HPF / Bacteria: Present /HPF        RADIOLOGY & ADDITIONAL STUDIES:

## 2022-02-24 NOTE — SWALLOW BEDSIDE ASSESSMENT ADULT - PHARYNGEAL PHASE
Hyolaryngeal excursion palpated during swallow trigger. Suspect mistimed and/or incomplete airway closure with cup sips of thin liquid AEB +cough with same. No overt signs of aspiration with mildly thick liquids.

## 2022-02-24 NOTE — SWALLOW BEDSIDE ASSESSMENT ADULT - MODE OF PRESENTATION
2x tsp water, 2x cup sip water, 4x cup sip mildly thick, 2x tsp puree, 1 tsp SBS/cup/spoon/self fed/fed by clinician

## 2022-02-24 NOTE — PROGRESS NOTE ADULT - ASSESSMENT
This is a 71 year old noncompliant Croatian speaking male, smoker (very recently quit, smoked x 40 years) with PMHx of CHF (EF 25-30%) and prediabetes (A1c 5.9) who is now s/p robotic MIDCAB with Dr. Santos on 02/09/2022.  Post OR he was extubated on POD 0, started on beta blockers by POD 2 and transferred to telemetry.  By POD 4 he was ambulating independently, on room air, having normal BM's, tolerating PO diet with surgical pain under control. He was discharged home on POD 4.  On 02/20/2022 patient presented to OSH with AMS. Work up included CT without contrast, CTA Head and Neck, and MRI Jerry showing acute MCA CVA with b/l acute inracts and the largest in the right basal ganglia and insular region. EEG negative for seizure. TTE = ? PFO. Patient transfer to [facility] for further management and workup. On 2/24 the stroke team was consulted for recent stroke findings. He failed a dysphagia screen, SLP came and approved soft, bite sized diet with mildly thick liquids. CT A/P completed on 2/8 showed bladder mass and ulceration to antrum of stomach (without drop in H/H) and GI and  were consulted. Patient will be transferred to neurology/stroke team service with CTSX following as a consult.    Plan:    Neurovascular:   -no pain  MCA CVA 2/20/22 at OSH  -neurology consulted  -appreciate recs    Cardiovascular:   2/9/22 uncomplicated robotic MIDCAB w/ Dr. Santos  -continue ASA  -continue metoprolol  -continue atorvastatin  -continue lasix   -Hemodynamically stable.   -Monitor: BP, HR, tele    Respiratory:   -Oxygenating well on room air  -Encourage continued use of IS 10x/hr and frequent ambulation    GI:  -GI PPX: continue protonix  -PO Diet: soft and bite sized, mildly thick liquids per SLP  -Bowel Regimen: start senna and miralax  hx stomach antrum ulceration  -GI consulted  -seen on CT on 2/8/22    Renal / :  -Continue to monitor renal function: BUN/Cr: 31/0.91  -Monitor I/O's daily   hx bladder mass  - consulted  -seen on CT on 2/8/22    Endocrine:    -No hx of DM or thyroid dx  -A1c: 5.4  -TSH: 7.640, T3 and T4 ordered    Hematologic:  -CBC: H/H- 12.5/41.2  -Coagulation Panel in AM  -no signs of symptoms of overt bleeding    ID:  -Temperature: afebrile  -CBC: WBC- 6.56  -Continue to observe for SIRS/Sepsis Syndrome.    Prophylaxis:  -DVT prophylaxis with 5000 SubQ Heparin q8h.  -Continue with SCD's b/l while patient is at rest     Disposition:  -Discharge home once patient is medically ready   This is a 71 year old noncompliant Romanian speaking male, smoker (very recently quit, smoked x 40 years) with PMHx of CHF (EF 25-30%) and prediabetes (A1c 5.9) who is now s/p robotic MIDCAB with Dr. Santos on 02/09/2022.  Post OR he was extubated on POD 0, started on beta blockers by POD 2 and transferred to telemetry.  By POD 4 he was ambulating independently, on room air, having normal BM's, tolerating PO diet with surgical pain under control. He was discharged home on POD 4.  On 02/20/2022 patient presented to OSH with AMS. Work up included CT without contrast, CTA Head and Neck, and MRI Jerry showing acute MCA CVA with b/l acute inracts and the largest in the right basal ganglia and insular region. EEG negative for seizure. TTE = ? PFO. Patient transfer to [facility] for further management and workup. On 2/24 the stroke team was consulted for recent stroke findings. He failed a dysphagia screen, SLP came and approved soft, bite sized diet with mildly thick liquids. CT A/P completed on 2/8 showed bladder mass and ulceration to antrum of stomach (without drop in H/H) and GI and  were consulted. TTE completed w/definity and showed EF of 20%. Patient will be transferred to neurology/stroke team service with CTSX following as a consult.    Plan:    Neurovascular:   -no pain  MCA CVA 2/20/22 at OSH  -neurology consulted  -appreciate recs    Cardiovascular:   2/9/22 uncomplicated robotic MIDCAB w/ Dr. Santos  -continue ASA  -continue metoprolol  -continue atorvastatin  -continue lasix   -Hemodynamically stable.   -Monitor: BP, HR, tele    Respiratory:   -Oxygenating well on room air  -Encourage continued use of IS 10x/hr and frequent ambulation    GI:  -GI PPX: continue protonix  -PO Diet: soft and bite sized, mildly thick liquids per SLP  -Bowel Regimen: start senna and miralax  hx stomach antrum ulceration  -GI consulted  -seen on CT on 2/8/22  Hypernatremic  -trending down (150 today, 151 yesterday), encourage PO fluids  -appreciate neuro recs  -try to avoid IV fluids since EF 20%    Renal / :  -Continue to monitor renal function: BUN/Cr: 31/0.91  -Monitor I/O's daily   hx bladder mass  - consulted  -seen on CT on 2/8/22  -UA significant for nitrite, protein - consider retesting since WBC negative    Endocrine:    -No hx of DM or thyroid dx  -A1c: 5.4  -TSH: 7.640, T3 and T4 ordered    Hematologic:  -CBC: H/H- 12.5/41.2  -Coagulation Panel in AM  -no signs of symptoms of overt bleeding    ID:  -Temperature: afebrile  -CBC: WBC- 6.56  -Continue to observe for SIRS/Sepsis Syndrome.    Prophylaxis:  -DVT prophylaxis with 5000 SubQ Heparin q8h.  -Continue with SCD's b/l while patient is at rest     Disposition:  -Discharge home once patient is medically ready   This is a 71 year old noncompliant Yoruba speaking male, smoker (very recently quit, smoked x 40 years) with PMHx of CHF (EF 25-30%) and prediabetes (A1c 5.9) who is now s/p robotic MIDCAB with Dr. Santos on 02/09/2022.  Post OR he was extubated on POD 0, started on beta blockers by POD 2 and transferred to telemetry.  By POD 4 he was ambulating independently, on room air, having normal BM's, tolerating PO diet with surgical pain under control. He was discharged home on POD 4.  On 02/20/2022 patient presented to OSH with AMS. Work up included CT without contrast, CTA Head and Neck, and MRI Jerry showing acute MCA CVA with b/l acute inracts and the largest in the right basal ganglia and insular region. EEG negative for seizure. TTE = ? PFO. Patient transfer to [facility] for further management and workup. On 2/24 the stroke team was consulted for recent stroke findings. He failed a dysphagia screen, SLP came and approved soft, bite sized diet with mildly thick liquids. CT A/P completed on 2/8 showed bladder mass and ulceration to antrum of stomach (without drop in H/H) and GI and  were consulted. TTE completed w/definity and showed EF of 20%. Patient will be transferred to neurology/stroke team service with CTSX following as a consult.    Plan:    Neurovascular:   -no pain  MCA CVA 2/20/22 at OSH  -neurology consulted  -appreciate recs    Cardiovascular:   2/9/22 uncomplicated robotic MIDCAB w/ Dr. Santos  -continue ASA  -continue metoprolol  -continue atorvastatin  -continue lasix   -Hemodynamically stable.   -Monitor: BP, HR, tele    Respiratory:   -Oxygenating well on room air  -Encourage continued use of IS 10x/hr and frequent ambulation    GI:  -GI PPX: continue protonix  -PO Diet: soft and bite sized, mildly thick liquids per SLP  -Bowel Regimen: start senna and miralax  hx stomach antrum ulceration  -GI consulted  -seen on CT on 2/8/22  Hypernatremic  -trending down (150 today, 151 yesterday), encourage PO fluids  -appreciate neuro recs  -try to avoid IV fluids since EF 20%    Renal / :  -Continue to monitor renal function: BUN/Cr: 31/0.91  -Monitor I/O's daily   hx bladder mass  - consulted  -seen on CT on 2/8/22  -UA significant for nitrite, protein - consider retesting since WBC negative    Endocrine:    -No hx of DM or thyroid dx  -A1c: 5.4  -TSH: 7.640, T3 and T4 ordered    Hematologic:  -CBC: H/H- 12.5/41.2  -Coagulation Panel in AM  -no signs of symptoms of overt bleeding    ID:  -Temperature: afebrile  -CBC: WBC- 6.56  -Continue to observe for SIRS/Sepsis Syndrome.    Prophylaxis:  -DVT prophylaxis with 5000 SubQ Heparin q8h.  -Continue with SCD's b/l while patient is at rest     Disposition:  -Discharge once patient is medically ready

## 2022-02-24 NOTE — DIETITIAN NUTRITION RISK NOTIFICATION - TREATMENT: THE FOLLOWING DIET HAS BEEN RECOMMENDED
Diet, Soft and Bite Sized:   DASH/TLC {Sodium & Cholesterol Restricted} (DASH)  Mildly Thick Liquids (MILDTHICKLIQS) (02-24-22 @ 12:18) [Active]

## 2022-02-24 NOTE — DIETITIAN NUTRITION RISK NOTIFICATION - ADDITIONAL COMMENTS/DIETITIAN RECOMMENDATIONS
Oral Nutrition Supplements: Ensure Enlive 2x/day (350 kcal, 20 g protein per serving)    1. Continue with current diet order (monitor need for liberalization)   2. Encourage pt to meet nutritional needs as able   3. RD to obtain subjective information/provide diet ed as able   4. Pain and bowel regimen per team   5. Will assess/honor preferences as able   6. Malnutrition notice placed   7. Continue micronutrient supplementation as needed

## 2022-02-24 NOTE — SWALLOW BEDSIDE ASSESSMENT ADULT - SWALLOW EVAL: DIAGNOSIS
Suspect at least a mild pharyngeal dysphagia AEB +cough on cup sips of thin liquid suggestive of aspiration. Pt appears to tolerate small, isolated cup sips of mildly thick liquids & soft solid textures.

## 2022-02-24 NOTE — CONSULT NOTE ADULT - ASSESSMENT
71y Yoruba-speaking M, smoker, Hx of CHF, PreDM, s/p robotic MIDCAB in early February, presenting now after found down with new infarcts. GI consulted for abnormal imaging.    Abnormal imaging - Patient with CT A/P on 2/8 during prior hospitalization demonstrating possible antral thickening/gastric mass. Patient now returning with CVA. GI consulted for imaging findings as patient easily lost to follow-up.  - Patient would benefit from EGD +/- EUS of CT findings  - Patient not speaking during examination despite use of  ([employer] [id number]), limiting history of any previous endoscopic evaluations  - Will need to obtain consent for any procedure from next of kin or HCP  - Will need Neurologic evaluation/optimization and recommendations prior to any endoscopic procedure  - Needs optimization of electrolytes (Na of 150)  - Tentative plan for EGD +/- EUS on Monday    Recommendations discussed with primary team  Case discussed with attending physician

## 2022-02-24 NOTE — CONSULT NOTE ADULT - ASSESSMENT
71 year old Ukrainian speaking male, smoker (very recently quit, smoked x 40 years) with PMHx of CHF (EF 25-30%) and prediabetes (A1c 5.9) who is now s/p robotic MIDCAB with Dr. Santos on 02/09/2022, ambulating well and discharged on POD 4, returned to OSH on 02/20/2022, MRI showed bilateral embolic appearing strokes. Pt had ECHO, EEG and cerebral angio done at OSH as well. Patient was transferred to [facility] for further MGMT and work up, initially to CT surg but then tx to stroke service given CVA as primary reason for admission.     Neurovascular:   - continue aspirin 81 mg  - hold plavix 75 mg for now pending possible GI procedure.   - q4h stroke checks and vitals  - stroke education  MCA CVA 2/20/22 at OSH  CTH left caudate/basal ganglia age indeterminate infarct   CTA head/neck showed focal right MCA M2 branch stenosis secondary to high grade near total occlusion versus focal occlusion, with occlusion of a right MCA anterior distal m2/M3 branch and posterior M2 branch narrowing with somewhat beaded appearance. Occlusion of left vertebral artery within V1 cannot be excluded. 50% left ICA stenosis. mild right ICA stenosis. 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    MRI Brain showing scattered bilateral acute/subacute infarctions, largest in the right basal ganglia and insular region, as well as in bilateral frontal lobes, left basal ganglia, and right parietal lobe.    EEG was negative for seizure.     Cardiovascular:   2/9/22 uncomplicated robotic MIDCAB w/ Dr. Santos  - CT surgery following appreciate recs, stitches removed from thoracotomy on 2/24  -continue ASA  -continue metoprolol  -continue atorvastatin  -continue lasix   -Hemodynamically stable.   -Monitor: BP, HR, tele  - TTE showed ? PFO at OSH though ECHO from [facility] on prior admission did not show PFO. - Repeat TTE with bubble and JOEY  - pt may need loop recorder as well pending workup    Respiratory:   -Oxygenating well on room air  -Encourage continued use of IS 10x/hr and frequent ambulation    GI:  -GI PPX: continue protonix  -PO Diet: soft and bite sized, mildly thick liquids per SLP  -Bowel Regimen: start senna and miralax  hx stomach antrum ulceration  -GI consulted, plan for EGD+/- EUS on Monday, appreciate recs   -abd mass seen on CT on 2/8/22  Hypernatremic  -trending down (150 today, 151 yesterday), encourage PO fluids  -appreciate neuro recs  -try to avoid IV fluids since EF 20%    Renal / :  -Continue to monitor renal function: BUN/Cr: 31/0.91  -Monitor I/O's daily   hx bladder mass  - consulted, appreciate recs  -bladder mass seen on CT on 2/8/22, per , okay to remove evans.   -UA significant for nitrite, protein - consider retesting since WBC negative    Endocrine:    -No hx of DM or thyroid dx  -A1c: 5.4  -TSH: 7.640, T3 and T4 ordered, f/u    Hematologic:  -CBC: H/H- 12.5/41.2  -Coagulation Panel in AM  -no signs of symptoms of overt bleeding    ID:  -Temperature: afebrile  -CBC: WBC- 6.56  -Continue to observe for SIRS/Sepsis Syndrome.    Prophylaxis:  -DVT prophylaxis with 5000 SubQ Heparin q8h.  -Continue with SCD's b/l while patient is at rest     Disposition:  -Discharge once patient is medically ready

## 2022-02-24 NOTE — CONSULT NOTE ADULT - TIME BILLING
review of patient information including recent vital signs, labs, imaging, and notes; assessing, examining patient; updating patient/family; discussion and coordination of care with multidisciplinary team.

## 2022-02-24 NOTE — SWALLOW BEDSIDE ASSESSMENT ADULT - SWALLOW EVAL: FEEDING ASSISTANCE
Pt was able to feed himself small cup sips but will require assist with meals to set up tray & w feeding/minimal assistance required

## 2022-02-24 NOTE — DIETITIAN INITIAL EVALUATION ADULT. - PERTINENT LABORATORY DATA
Sodium, Serum: 150 mmol/L (Elevated)  Potassium, Serum: 3.5 mmol/L  Chloride, Serum: 113 mmol/L (Elevated)  BUN, Serum: 31 mg/dL (Elevated)  Creatinine, Serum: 0.91 mg/dL  Glucose, Serum: 68 mg/dL (Low)  Calcium, Serum: 9.4 mg/dL  Magnesium, Serum: 2.4 mg/dL  Phosphorus, Serum: 4.3 mg/dL    Last HbA1c 5.4% (2/8)    Lipid Profile (2/8)  Cholesterol, Serum: 170 mg/dL  Triglycerides, Serum: 64 mg/dL  HDL Cholesterol, Serum: 67 mg/dL   LDL Cholesterol Calculated: 90 mg/dL

## 2022-02-24 NOTE — DIETITIAN INITIAL EVALUATION ADULT. - OTHER CALCULATIONS
Defer fluids to team. Based on Standards of Care pt <% IBW thus ideal body weight used for all calculations. Needs adjusted for advanced age and suspected malnutrition.

## 2022-02-24 NOTE — CONSULT NOTE ADULT - SUBJECTIVE AND OBJECTIVE BOX
GASTROENTEROLOGY CONSULT NOTE  HPI:  This is a 71 year old Dutch speaking male, smoker (very recently quit, smoked x 40 years) with PMHx of CHF (EF 25-30%) and prediabetes (A1c 5.9) who is now s/p robotic MIDCAB with Dr. Santos on 02/09/2022.  Post OR he was extubated on POD 0, started on beta blockers by POD 2 and transferred to telemetry.  By POD 4 he was ambulating independently, on room air, having normal BM's, tolerating PO diet with surgical pain under control. He was discharged home on POD 4.  On 02/20/2022 patient presented to OSH with AMS. Work up included CT without, CTA Head and Neck and MRI Jerry showing acute MCA CVA, b/l acute inracts  with the largest in the right basal ganglia and insular region. EEG negative for seizure. TTE = ? PFO. Patient transfer to [facility] for further MGMT and work up.      PMH/PSH:   See HPI.    SH:  40 years of 1ppd smoking, stopped 01/2022  Alcohol: 1 drink per night, stopped 01/2022  Elicit drugs: No  Occupation: ?  Home: Yes    FH:  N/A    Allergies:   See above.     Home Rx.:   See below. (23 Feb 2022 17:49)    Allergies    No Known Allergies    Intolerances      Home Medications:    MEDICATIONS:  MEDICATIONS  (STANDING):  aspirin  chewable 81 milliGRAM(s) Oral daily  atorvastatin 80 milliGRAM(s) Oral at bedtime  furosemide   Injectable 10 milliGRAM(s) IV Push daily  heparin   Injectable 5000 Unit(s) SubCutaneous every 8 hours  metoprolol tartrate 12.5 milliGRAM(s) Oral every 12 hours  pantoprazole    Tablet 40 milliGRAM(s) Oral before breakfast  potassium chloride   Powder 40 milliEquivalent(s) Oral once  sodium chloride 0.9% lock flush 3 milliLiter(s) IV Push every 8 hours    MEDICATIONS  (PRN):    PAST MEDICAL & SURGICAL HISTORY:  Acute on chronic combined systolic and diastolic congestive heart failure    Prediabetes    Smoking hx      FAMILY HISTORY:    SOCIAL HISTORY:  As above    REVIEW OF SYSTEMS:  Unable to fully obtain    Vital Signs Last 24 Hrs  T(C): 36.7 (24 Feb 2022 09:00), Max: 36.7 (24 Feb 2022 09:00)  T(F): 98 (24 Feb 2022 09:00), Max: 98 (24 Feb 2022 09:00)  HR: 80 (24 Feb 2022 13:00) (69 - 156)  BP: 103/68 (24 Feb 2022 13:00) (103/68 - 132/91)  BP(mean): 80 (24 Feb 2022 13:00) (80 - 108)  RR: 14 (24 Feb 2022 13:00) (12 - 24)  SpO2: 97% (24 Feb 2022 13:00) (96% - 100%)    02-23 @ 07:01  -  02-24 @ 07:00  --------------------------------------------------------  IN: 0 mL / OUT: 675 mL / NET: -675 mL    02-24 @ 07:01  -  02-24 @ 14:14  --------------------------------------------------------  IN: 100 mL / OUT: 0 mL / NET: 100 mL        PHYSICAL EXAM:    General: Thin, no acute distress, appears aphasic  Eyes: Anicteric sclerae, moist conjunctivae  HENT: Moist mucous membranes  Neck: Trachea midline, supple  Lungs: Normal respiratory effort, no intercostal retractions  Cardiovascular: RRR  Abdomen: Scaphoid, thin, non-distended, non-tender  Extremities: Normal range of motion, No clubbing, cyanosis or edema  Neurological: Appears aphasic, but using appropriate hand gestures and nodding appropriately to questioning via   Skin: Warm and dry. No obvious rash    LABS:                        12.5   6.56  )-----------( 321      ( 24 Feb 2022 06:19 )             41.2     02-24    150<H>  |  113<H>  |  31<H>  ----------------------------<  68<L>  3.5   |  21<L>  |  0.91    Ca    9.4      24 Feb 2022 06:19  Phos  4.3     02-24  Mg     2.4     02-24    TPro  7.2  /  Alb  3.6  /  TBili  0.8  /  DBili  x   /  AST  20  /  ALT  11  /  AlkPhos  73  02-24        PT/INR - ( 24 Feb 2022 00:56 )   PT: 13.3 sec;   INR: 1.12          PTT - ( 24 Feb 2022 00:56 )  PTT:31.8 sec    RADIOLOGY & ADDITIONAL STUDIES:     Reviewed

## 2022-02-24 NOTE — PROGRESS NOTE ADULT - SUBJECTIVE AND OBJECTIVE BOX
Patient discussed on morning rounds with Dr. Santos    Operation / Date: 22 uncomplicated robotic assisted MIDCAB    SUBJECTIVE ASSESSMENT:  71y Male seen and examined. Patient lethargic but awake, limited in responding. Tolerating soft bite sized, mildly thick liquid diet, satting well on room air.       Vital Signs Last 24 Hrs  T(C): 37.1 (2022 14:00), Max: 37.1 (2022 14:00)  T(F): 98.7 (2022 14:00), Max: 98.7 (2022 14:00)  HR: 80 (2022 13:00) (69 - 156)  BP: 103/68 (2022 13:00) (103/68 - 132/91)  BP(mean): 80 (2022 13:00) (80 - 108)  RR: 14 (2022 13:00) (12 - 24)  SpO2: 97% (2022 13:00) (96% - 100%)  I&O's Detail    2022 07:01  -  2022 07:00  --------------------------------------------------------  IN:  Total IN: 0 mL    OUT:    Indwelling Catheter - Urethral (mL): 675 mL  Total OUT: 675 mL    Total NET: -675 mL      2022 07:01  -  2022 14:50  --------------------------------------------------------  IN:    IV PiggyBack: 100 mL  Total IN: 100 mL    OUT:  Total OUT: 0 mL    Total NET: 100 mL      CHEST TUBE:  No  KERMIT DRAIN:  No.  EPICARDIAL WIRES: No.  TIE DOWNS: No.  AGUILAR: Yes.    PHYSICAL EXAM:    General: NAD, sitting comfortably in bed, aphasic  Neurological: A&Ox0, lethargic, RUE and RLE strength 4/5, LUE and LLE strength 2/5, facial asymmetry  Cardiovascular: RRR, Clear S1 and S2, no murmurs appreciated  Respiratory: chest expansion symmetrical, CTA b/l, no wheezing noted  Gastrointestinal: +BS, soft, NT, ND  Extremities: moving spontaneously, no calf tenderness or edema.  Vascular: warm, well perfused. DP/PT pulses palpable b/l.  Incisions: L thoracotomy C/D/I      LABS:                        12.5   6.56  )-----------( 321      ( 2022 06:19 )             41.2       COUMADIN: no    PT/INR - ( 2022 00:56 )   PT: 13.3 sec;   INR: 1.12          PTT - ( 2022 00:56 )  PTT:31.8 sec    02-24    150<H>  |  113<H>  |  31<H>  ----------------------------<  68<L>  3.5   |  21<L>  |  0.91    Ca    9.4      2022 06:19  Phos  4.3     02-24  Mg     2.4     -24    TPro  7.2  /  Alb  3.6  /  TBili  0.8  /  DBili  x   /  AST  20  /  ALT  11  /  AlkPhos  73  02-24      Urinalysis Basic - ( 2022 01:50 )    Color: Orange / Appearance: SL Cloudy / S.015 / pH: x  Gluc: x / Ketone: Trace mg/dL  / Bili: Small / Urobili: 0.2 E.U./dL   Blood: x / Protein: 100 mg/dL / Nitrite: POSITIVE   Leuk Esterase: NEGATIVE / RBC: > 10 /HPF / WBC < 5 /HPF   Sq Epi: x / Non Sq Epi: 0-5 /HPF / Bacteria: Present /HPF    MEDICATIONS  (STANDING):  aspirin  chewable 81 milliGRAM(s) Oral daily  atorvastatin 80 milliGRAM(s) Oral at bedtime  furosemide   Injectable 10 milliGRAM(s) IV Push daily  heparin   Injectable 5000 Unit(s) SubCutaneous every 8 hours  metoprolol tartrate 12.5 milliGRAM(s) Oral every 12 hours  pantoprazole    Tablet 40 milliGRAM(s) Oral before breakfast  potassium chloride   Powder 40 milliEquivalent(s) Oral once  sodium chloride 0.9% lock flush 3 milliLiter(s) IV Push every 8 hours    MEDICATIONS  (PRN):      RADIOLOGY & ADDITIONAL TESTS:    < from: Xray Chest 2 Views PA/Lat (22 @ 10:18) >    INTERPRETATION:  Clinical History: Postop    Frontal and lateral examination of the chest demonstrates the heart to be   within normal limits in transverse diameter. Discoid change left lung   base. Visualized osseous structures are within normal limits. Status post   removal right chest tube.    IMPRESSION: Discoid change left lung base. Small bilateral effusions    < end of copied text >    CXR 22: awaiting official read. No acute changes in comparison to previous.

## 2022-02-24 NOTE — DIETITIAN INITIAL EVALUATION ADULT. - ADD RECOMMEND
Down time occurred please refer to paper charting. Pt.  Moved to ED #[id number]. 2nd bag of irrigant instilling without difficulty.      Estella Quach RN  06/26/21 [id number] 1. Continue with current diet order (monitor need for liberalization) 2. Encourage pt to meet nutritional needs as able 3. RD to obtain subjective information/provide diet ed as able 4. Pain and bowel regimen per team 5. Will assess/honor preferences as able 6. Malnutrition notice placed 7. Continue micronutrient supplementation as needed

## 2022-02-24 NOTE — SWALLOW BEDSIDE ASSESSMENT ADULT - COMMENTS
Received in bed, awake but sleepy. Mongolian-speaking.  #[id number] via video phone provided Mongolian-English translation. Pt responded inconsistently to simple commands. RN reports Pt had a large cough & spit out retained water during dysphagia screen.

## 2022-02-24 NOTE — SWALLOW BEDSIDE ASSESSMENT ADULT - SLP GENERAL OBSERVATIONS
Unknown if ever smoked
Voice was initially aphonic but improved with liquid trials and verbal encouragement to speak louder.

## 2022-02-24 NOTE — CONSULT NOTE ADULT - SUBJECTIVE AND OBJECTIVE BOX
CONSULT NOTE:      HPI:   71 year old [place] speaking male, smoker (very recently quit, smoked x 40 years) with PMHx of CHF (EF 25-30%) and prediabetes (A1c 5.9) who is now s/p robotic MIDCAB with Dr. Santos on 02/09/2022. He was discharged home on POD 4.  On 02/20/2022 patient presented to OSH with AMS. Work up included CT without, CTA Head and Neck and MRI Jerry showing acute MCA CVA, b/l acute inracts  with the largest in the right basal ganglia and insular region. EEG negative for seizure. TTE = ? PFO. Patient transfer to [facility] for further MGMT and work up.  Called to see patient for incidental finding of 1.4 cm bladder lesion. Attempted to get history from patient with  at bedside but patient unable to answer any questions.     PMH/PSH:   See HPI.    SH:  40 years of 1ppd smoking, stopped 01/2022  Alcohol: 1 drink per night, stopped 01/2022  Elicit drugs: No  Occupation: ?  Home: Yes    FH:  N/A    Allergies:   See above.     Home Rx.:   See below. (23 Feb 2022 17:49)      Vital Signs Last 24 Hrs  T(C): 37.1 (24 Feb 2022 14:00), Max: 37.1 (24 Feb 2022 14:00)  T(F): 98.7 (24 Feb 2022 14:00), Max: 98.7 (24 Feb 2022 14:00)  HR: 85 (24 Feb 2022 14:00) (69 - 156)  BP: 113/69 (24 Feb 2022 14:00) (103/68 - 132/91)  BP(mean): 83 (24 Feb 2022 14:00) (80 - 108)  RR: 17 (24 Feb 2022 14:00) (12 - 24)  SpO2: 97% (24 Feb 2022 14:00) (96% - 100%)  I&O's Summary    23 Feb 2022 07:01  -  24 Feb 2022 07:00  --------------------------------------------------------  IN: 0 mL / OUT: 675 mL / NET: -675 mL    24 Feb 2022 07:01  -  24 Feb 2022 16:04  --------------------------------------------------------  IN: 100 mL / OUT: 0 mL / NET: 100 mL        PE:  Gen: nad  Abd: soft, nd, nt  : evans draining clear urine       LABS:                        12.5   6.56  )-----------( 321      ( 24 Feb 2022 06:19 )             41.2     02-24    150<H>  |  113<H>  |  31<H>  ----------------------------<  68<L>  3.5   |  21<L>  |  0.91    Ca    9.4      24 Feb 2022 06:19  Phos  4.3     02-24  Mg     2.4     02-24    TPro  7.2  /  Alb  3.6  /  TBili  0.8  /  DBili  x   /  AST  20  /  ALT  11  /  AlkPhos  73  02-24    PT/INR - ( 24 Feb 2022 00:56 )   PT: 13.3 sec;   INR: 1.12          PTT - ( 24 Feb 2022 00:56 )  PTT:31.8 sec  Cultures      A/P 70 yo m with incidental finding of bladder mass and left renal lesion  1)Would send urine cytology  2) would get MRI renal mass protocol or CT renal mass protocol to characterize renal lesion  3) will need outpatient cytoscopy to evaluate bladder mass  4) discussed with gu attending

## 2022-02-24 NOTE — DIETITIAN INITIAL EVALUATION ADULT. - OTHER INFO
71 year old Amharic speaking male, smoker (very recently quit, smoked x 40 years) with PMHx of CHF (EF 25-30%) and prediabetes (A1c 5.9) who is now s/p robotic MIDCAB with Dr. Santos on 02/09/2022.  Post OR he was extubated on POD 0, started on beta blockers by POD 2 and transferred to telemetry.  By POD 4 he was ambulating independently, on room air, having normal BM's, tolerating PO diet with surgical pain under control. He was discharged home on POD 4.  On 02/20/2022 patient presented to OSH with AMS. Work up included CT without, CTA Head and Neck and MRI Jerry showing acute MCA CVA, b/l acute inracts  with the largest in the right basal ganglia and insular region. EEG negative for seizure. TTE = ? PFO. Patient transfer to [facility] for further MGMT and work up.      Pt seen at bedside for initial assessment- on room air. Pt asleep upon RD interview unable to arouse. Per RN pt not responding to questions via  appropriately thus interview deferred to EMR (no emergency contact information present in EMR). No nausea/vomiting documented at this time. No bowel movement documented since admission. NKFA documented. Unable to obtain diet recall PTA. Dosing weight 87 pounds. Per EMR, pt 89 pounds 2/10/22; relatively stable. No height in EMR, per previous admission pt 5'4. No edema documented at this time. No pressure ulcers documented at this time. Left thoracotomy and right groin incisions per chart. Mauricio score=12. Labs reviewed 2/24; pt hypernatremic; all other electrolytes within normal limits at this time. Observed pt w/ severe wasting in temple clavicle and tricep regions. Based on ASPEN guidelines, pt meets criteria for severe malnutrition. Pt NPO at time of RD interview, has since been seen by speech recommending soft and bite sized w/ mildly thick liquids. RD to follow up per protocol. See nutrition recommendations below.

## 2022-02-24 NOTE — SWALLOW BEDSIDE ASSESSMENT ADULT - SLP PERTINENT HISTORY OF CURRENT PROBLEM
70 yo M 40 pack yr smoker, quit recently, s/p MIDCAB on 2/9/22, d/c'd on POD 4, now readm w AMS. MRI brain revealed acute MCA CVA & b/l acute infarcts in basal ganglia and insular region.

## 2022-02-25 ENCOUNTER — RESULT REVIEW (OUTPATIENT)
Age: 72
End: 2022-02-25

## 2022-02-25 LAB
ANION GAP SERPL CALC-SCNC: 12 MMOL/L — SIGNIFICANT CHANGE UP (ref 5–17)
APPEARANCE UR: CLEAR — SIGNIFICANT CHANGE UP
BACTERIA # UR AUTO: PRESENT /HPF
BILIRUB UR-MCNC: NEGATIVE — SIGNIFICANT CHANGE UP
BUN SERPL-MCNC: 36 MG/DL — HIGH (ref 7–23)
CALCIUM SERPL-MCNC: 9.4 MG/DL — SIGNIFICANT CHANGE UP (ref 8.4–10.5)
CHLORIDE SERPL-SCNC: 115 MMOL/L — HIGH (ref 96–108)
CO2 SERPL-SCNC: 24 MMOL/L — SIGNIFICANT CHANGE UP (ref 22–31)
COLOR SPEC: YELLOW — SIGNIFICANT CHANGE UP
CREAT SERPL-MCNC: 0.96 MG/DL — SIGNIFICANT CHANGE UP (ref 0.5–1.3)
DIFF PNL FLD: ABNORMAL
EPI CELLS # UR: SIGNIFICANT CHANGE UP /HPF (ref 0–5)
GLUCOSE SERPL-MCNC: 137 MG/DL — HIGH (ref 70–99)
GLUCOSE UR QL: NEGATIVE — SIGNIFICANT CHANGE UP
HCT VFR BLD CALC: 42.2 % — SIGNIFICANT CHANGE UP (ref 39–50)
HGB BLD-MCNC: 13.4 G/DL — SIGNIFICANT CHANGE UP (ref 13–17)
KETONES UR-MCNC: NEGATIVE — SIGNIFICANT CHANGE UP
LEUKOCYTE ESTERASE UR-ACNC: NEGATIVE — SIGNIFICANT CHANGE UP
MAGNESIUM SERPL-MCNC: 2.4 MG/DL — SIGNIFICANT CHANGE UP (ref 1.6–2.6)
MCHC RBC-ENTMCNC: 31.2 PG — SIGNIFICANT CHANGE UP (ref 27–34)
MCHC RBC-ENTMCNC: 31.8 GM/DL — LOW (ref 32–36)
MCV RBC AUTO: 98.1 FL — SIGNIFICANT CHANGE UP (ref 80–100)
NITRITE UR-MCNC: NEGATIVE — SIGNIFICANT CHANGE UP
NRBC # BLD: 0 /100 WBCS — SIGNIFICANT CHANGE UP (ref 0–0)
PH UR: 6 — SIGNIFICANT CHANGE UP (ref 5–8)
PHOSPHATE SERPL-MCNC: 3.7 MG/DL — SIGNIFICANT CHANGE UP (ref 2.5–4.5)
PLATELET # BLD AUTO: 345 K/UL — SIGNIFICANT CHANGE UP (ref 150–400)
PLATELET RESPONSE ASPIRIN RESULT: 412 ARU — SIGNIFICANT CHANGE UP (ref 350–700)
POTASSIUM SERPL-MCNC: 4.2 MMOL/L — SIGNIFICANT CHANGE UP (ref 3.5–5.3)
POTASSIUM SERPL-SCNC: 4.2 MMOL/L — SIGNIFICANT CHANGE UP (ref 3.5–5.3)
PROT UR-MCNC: ABNORMAL MG/DL
RBC # BLD: 4.3 M/UL — SIGNIFICANT CHANGE UP (ref 4.2–5.8)
RBC # FLD: 14.7 % — HIGH (ref 10.3–14.5)
RBC CASTS # UR COMP ASSIST: ABNORMAL /HPF
SODIUM SERPL-SCNC: 151 MMOL/L — HIGH (ref 135–145)
SP GR SPEC: 1.02 — SIGNIFICANT CHANGE UP (ref 1–1.03)
T3 SERPL-MCNC: 67 NG/DL — LOW (ref 80–200)
T4 AB SER-ACNC: 7.1 UG/DL — SIGNIFICANT CHANGE UP (ref 4.5–11.7)
T4 FREE SERPL-MCNC: 1.19 NG/DL — SIGNIFICANT CHANGE UP (ref 0.93–1.7)
UROBILINOGEN FLD QL: 0.2 E.U./DL — SIGNIFICANT CHANGE UP
WBC # BLD: 6.43 K/UL — SIGNIFICANT CHANGE UP (ref 3.8–10.5)
WBC # FLD AUTO: 6.43 K/UL — SIGNIFICANT CHANGE UP (ref 3.8–10.5)
WBC UR QL: < 5 /HPF — SIGNIFICANT CHANGE UP

## 2022-02-25 PROCEDURE — 99223 1ST HOSP IP/OBS HIGH 75: CPT

## 2022-02-25 PROCEDURE — 99232 SBSQ HOSP IP/OBS MODERATE 35: CPT

## 2022-02-25 PROCEDURE — 99232 SBSQ HOSP IP/OBS MODERATE 35: CPT | Mod: GC

## 2022-02-25 PROCEDURE — 99233 SBSQ HOSP IP/OBS HIGH 50: CPT

## 2022-02-25 PROCEDURE — 88112 CYTOPATH CELL ENHANCE TECH: CPT | Mod: 26

## 2022-02-25 PROCEDURE — 71045 X-RAY EXAM CHEST 1 VIEW: CPT | Mod: 26

## 2022-02-25 RX ORDER — METOPROLOL TARTRATE 50 MG
25 TABLET ORAL DAILY
Refills: 0 | Status: DISCONTINUED | OUTPATIENT
Start: 2022-02-25 | End: 2022-03-03

## 2022-02-25 RX ADMIN — Medication 12.5 MILLIGRAM(S): at 06:53

## 2022-02-25 RX ADMIN — Medication 10 MILLIGRAM(S): at 06:43

## 2022-02-25 RX ADMIN — PANTOPRAZOLE SODIUM 40 MILLIGRAM(S): 20 TABLET, DELAYED RELEASE ORAL at 06:53

## 2022-02-25 RX ADMIN — Medication 25 MILLIGRAM(S): at 17:16

## 2022-02-25 RX ADMIN — HEPARIN SODIUM 5000 UNIT(S): 5000 INJECTION INTRAVENOUS; SUBCUTANEOUS at 06:48

## 2022-02-25 RX ADMIN — SENNA PLUS 2 TABLET(S): 8.6 TABLET ORAL at 21:51

## 2022-02-25 RX ADMIN — ATORVASTATIN CALCIUM 80 MILLIGRAM(S): 80 TABLET, FILM COATED ORAL at 21:51

## 2022-02-25 RX ADMIN — Medication 81 MILLIGRAM(S): at 12:13

## 2022-02-25 RX ADMIN — HEPARIN SODIUM 5000 UNIT(S): 5000 INJECTION INTRAVENOUS; SUBCUTANEOUS at 17:16

## 2022-02-25 NOTE — CONSULT NOTE ADULT - SUBJECTIVE AND OBJECTIVE BOX
Patient is a 71 year old [place] speaking Male, smoker (with 40 Pack year History with PMHx of ICM (EF 20-25%) 2/2 CAD s/p MIDCAB (LIMA-LAD) on , Grade III DD, prediabetes (A1c 5.9), Bladder and Gastric mass not yet investigated, who presented to OSH with AMS and aphasia, found to have MRI evidence of scattered bilateral acute/subacute infarctions, largest in the right basal ganglia and insular region, as well as in bilateral frontal lobes, left basal ganglia, and right parietal lobe. He underwent cerebral angiogram done as well at OSH which showed moderate stenosis of b/l ICA's and b/l mild stenosis at ICA origins, and moderate to severe stenosis of left subclavian artery.     Echo this hospitalization revealed Severely reduced LVSF with EF of 20% with global hypokinesis, LVH     Pertinent Cardiac Studies:  CCTA ([facility] [place]) 22: Severe heavily calcified proximal LAD stenosis to distal L Main Artery.  TTE 22: EF 20-25%, Mild LVH, Grade III DD, Normal RV size and function, PASP: 42mmHG  TTE 22: EF 20%, Mild LVH, Normal RV size and Function. Unable to complete diastology assessment      PAST MEDICAL & SURGICAL HISTORY:  Acute on chronic combined systolic and diastolic congestive heart failure  Prediabetes  Smoking hx    Home Medications:  Metoprolol 25 mg PO BID  Lipitor 80 mg Qd  ASA    MEDICATIONS  (STANDING):  aspirin  chewable 81 milliGRAM(s) Oral daily  atorvastatin 80 milliGRAM(s) Oral at bedtime  furosemide   Injectable 10 milliGRAM(s) IV Push daily  heparin   Injectable 5000 Unit(s) SubCutaneous every 12 hours  metoprolol tartrate 12.5 milliGRAM(s) Oral every 12 hours  pantoprazole    Tablet 40 milliGRAM(s) Oral before breakfast  senna 2 Tablet(s) Oral at bedtime    MEDICATIONS  (PRN):  polyethylene glycol 3350 17 Gram(s) Oral daily PRN Constipation      .  VITAL SIGNS:  T(C): 36.4 (22 @ 13:11), Max: 37.1 (22 @ 14:00)  T(F): 97.5 (22 @ 13:11), Max: 98.7 (22 @ 14:00)  HR: 108 (22 @ 12:30) (85 - 128)  BP: 136/87 (22 @ 12:20) (92/61 - 137/93)  BP(mean): 104 (22 @ 12:20) (72 - 109)  RR: 18 (22 @ 12:20) (15 - 18)  SpO2: 100% (22 @ 12:30) (96% - 100%)  Wt(kg): --    PHYSICAL EXAM: INCOMPLETE    Constitutional: WDWN resting comfortably in bed; NAD  Head: NC/AT  Eyes: PERRL, EOMI, anicteric sclera  ENT: no nasal discharge; uvula midline, no oropharyngeal erythema or exudates; MMM  Neck: supple; no JVD or thyromegaly  Respiratory: CTA B/L; no W/R/R, no retractions  Cardiac: +S1/S2; RRR; no M/R/G; PMI non-displaced  Gastrointestinal: soft, NT/ND; no rebound or guarding; +BSx4  Genitourinary: normal external genitalia  Back: spine midline, no bony tenderness or step-offs; no CVAT B/L  Extremities: WWP, no clubbing or cyanosis; no peripheral edema  Musculoskeletal: NROM x4; no joint swelling, tenderness or erythema  Vascular: 2+ radial, femoral, DP/PT pulses B/L  Dermatologic: skin warm, dry and intact; no rashes, wounds, or scars  Lymphatic: no submandibular or cervical LAD  Neurologic: AAOx3; CNII-XII grossly intact; no focal deficits  Psychiatric: affect and characteristics of appearance, verbalizations, behaviors are appropriate    .  LABS:                         13.4   6.43  )-----------( 345      ( 2022 08:58 )             42.2         151<H>  |  115<H>  |  36<H>  ----------------------------<  137<H>  4.2   |  24  |  0.96    Ca    9.4      2022 08:58  Phos  3.7       Mg     2.4         TPro  7.2  /  Alb  3.6  /  TBili  0.8  /  DBili  x   /  AST  20  /  ALT  11  /  AlkPhos  73  02-24    PT/INR - ( 2022 00:56 )   PT: 13.3 sec;   INR: 1.12          PTT - ( 2022 00:56 )  PTT:31.8 sec  Urinalysis Basic - ( 2022 04:33 )    Color: Yellow / Appearance: Clear / S.025 / pH: x  Gluc: x / Ketone: NEGATIVE  / Bili: Negative / Urobili: 0.2 E.U./dL   Blood: x / Protein: Trace mg/dL / Nitrite: NEGATIVE   Leuk Esterase: NEGATIVE / RBC: Many /HPF / WBC < 5 /HPF   Sq Epi: x / Non Sq Epi: 0-5 /HPF / Bacteria: Present /HPF                RADIOLOGY, EKG & ADDITIONAL TESTS: Reviewed.       < from: TTE Echo Complete w/ Contrast w/ Doppler (22 @ 09:08) >     1. Mild symmetric left ventricular hypertrophy.   2. Left ventricularsystolic function is severely reduced with a   calculated ejection fraction of 20-25% with global hypokinesis.   3. The inferolateral wall, the anterolateral wall and apical segments   appear more hypokinetic. No LV thrombus seen.   4. Grade III leftventricular diastolic dysfunction.   5. Normal right ventricular size and systolic function.   6. Aortic sclerosis without significant stenosis.   7. Mild aortic regurgitation.   8. Mild mitral regurgitation.   9. Pulmonary hypertension present, pulmonary artery systolic pressure is   42 mmHg.  10. Small pericardial effusion without echocardiographic evidence of   cardiac tamponade physiology.  11. No prior echo is available for comparison.    < end of copied text >  < from: TTE Echo Complete w/ Contrast w/ Doppler (22 @ 09:08) >  LVIDd (2D):     5.40 cm  (4.2-5.8)     (3.8-5.2)    < end of copied text >  < from: TTE Echo Complete w/ Contrast w/ Doppler (22 @ 09:08) >  Left ventricular systolic function is severely reduced with a calculated   ejection fraction of 20-25% with global hypokinesis. The inferolateral   wall, the anterolateral wall and apical segments appear more hypokinetic.   No LV thrombus seen. Analysis of mitral annular tissue Doppler, left   atrial volume index, and tricuspid regurgitantvelocity reveals: grade   III diastolic dysfunction with elevated filling pressure.    < end of copied text >      < from: TTE Echo Complete w/ Contrast w/ Doppler (22 @ 09:21) >  CONCLUSIONS:     1. Aortic sclerosis without significant stenosis.   2. Mild aortic regurgitation.   3.Mild pulmonic regurgitation.   4. No other significant valvular disease.   5. Normal right ventricular size and systolic function.   6. The left ventricle cavity size is normal. There is mild concentric   left ventricular hypertrophy. Abnormal septalmotion seen due to abnormal   conduction. Left ventricular systolic function is severely reduced with a   calculated ejection fraction of 20% with regional wall motion   abnormalities.   7. Trivial pericardial effusion without echocardiographic evidence of   cardiac tamponade physiology.   8. Bilateral pleural effusion.   9. Compared to the previous TTE performed on 2022, pericardial   effusion appears to have decreased however image quality was better on   prior study.    < end of copied text >  < from: TTE Echo Complete w/ Contrast w/ Doppler (22 @ 09:21) >  eft Ventricle:  The left ventricle cavity size is normal. There is mild concentric left   ventricular hypertrophy. Abnormal septal motion seen due to abnormal   conduction. Left ventricular systolic function is severely reduced with a   calculated ejection fraction of 20% with regional wall motion   abnormalities. Apical segments appear akinetic. Remaining segments are   severely hypokinetic. Analysis of left ventricular diastolic function and   filling pressure is made challenging by the presence of suboptimal mitral   inflow doppler.    < end of copied text >    < from: CT Abdomen and Pelvis No Cont (22 @ 18:55) >   A dominant dense lesion within the left renal   cortex measures up to 2.1 cm.    < end of copied text >  < from: CT Abdomen and Pelvis No Cont (22 @ 18:55) >  Mucosal thickening of the stomach, an irregular   mucosal thickening of the distal stomach not excluding gastritis or an   underlying mass. No    < end of copied text >  < from: CT Abdomen and Pelvis No Cont (22 @ 18:55) >  moderate aorta iliac atherosclerosis.    < end of copied text >  < from: CT Abdomen and Pelvis No Cont (22 @ 18:55) >  illing defect or mass within the posterior right aspect of the urinary   bladder measures up to 1.8 x 1.0 x 1.7 cm.    < end of copied text >  < from: CT Abdomen and Pelvis No Cont (22 @ 18:55) >   A small to moderate right pleural effusionright basilar airspace   disease, COPD type changes.  2. Mucosal thickening of the stomach, an irregular mucosal thickening of   the distal stomach not excluding gastritis or an underlying mass.  3. A residual contrast within the urinary bladder, a filling defect or   mass within the posterior right aspect of the urinary bladder measures up   to 1.8 x 1.0 x 1.7 cm.  4. A dominant dense lesion within the left renal cortex measures up to   2.1 cm. Recommend ultrasound correlation.    < end of copied text >   Patient is a 71 year old [place] speaking Male, smoker (with 40 Pack year History with PMHx of ICM (EF 20-25%) 2/2 CAD s/p MIDCAB (LIMA-LAD) on , Grade III DD, prediabetes (A1c 5.9), Bladder and Gastric mass not yet investigated, who presented to OSH with AMS and aphasia, found to have MRI evidence of scattered bilateral acute/subacute infarctions, largest in the right basal ganglia and insular region, as well as in bilateral frontal lobes, left basal ganglia, and right parietal lobe. He underwent cerebral angiogram done as well at OSH which showed moderate stenosis of b/l ICA's and b/l mild stenosis at ICA origins, and moderate to severe stenosis of left subclavian artery. Patient's gastric and bladder mass are being investigated. He will likely require EGD/Colonoscopy    Echo this hospitalization revealed Severely reduced LVSF with EF of 20% with global hypokinesis, LVH     Pertinent Cardiac Studies:  CCTA ([facility] [place]) 22: Severe heavily calcified proximal LAD stenosis to distal L Main Artery.  TTE 22: EF 20-25%, Mild LVH, Grade III DD, Normal RV size and function, PASP: 42mmHG  TTE 22: EF 20%, Mild LVH, Normal RV size and Function. Unable to complete diastology assessment      PAST MEDICAL & SURGICAL HISTORY:  Acute on chronic combined systolic and diastolic congestive heart failure  Prediabetes  Smoking hx    Home Medications:  Metoprolol 25 mg PO BID  Lipitor 80 mg Qd  ASA    MEDICATIONS  (STANDING):  aspirin  chewable 81 milliGRAM(s) Oral daily  atorvastatin 80 milliGRAM(s) Oral at bedtime  furosemide   Injectable 10 milliGRAM(s) IV Push daily  heparin   Injectable 5000 Unit(s) SubCutaneous every 12 hours  metoprolol tartrate 12.5 milliGRAM(s) Oral every 12 hours  pantoprazole    Tablet 40 milliGRAM(s) Oral before breakfast  senna 2 Tablet(s) Oral at bedtime    MEDICATIONS  (PRN):  polyethylene glycol 3350 17 Gram(s) Oral daily PRN Constipation      .  VITAL SIGNS:  T(C): 36.4 (22 @ 13:11), Max: 37.1 (22 @ 14:00)  T(F): 97.5 (22 @ 13:11), Max: 98.7 (22 @ 14:00)  HR: 108 (22 @ 12:30) (85 - 128)  BP: 136/87 (22 @ 12:20) (92/61 - 137/93)  BP(mean): 104 (22 @ 12:20) (72 - 109)  RR: 18 (22 @ 12:20) (15 - 18)  SpO2: 100% (22 @ 12:30) (96% - 100%)  Wt(kg): --    PHYSICAL EXAM: INCOMPLETE    Constitutional: WDWN resting comfortably in bed; NAD  Head: NC/AT  Eyes: PERRL, EOMI, anicteric sclera  ENT: no nasal discharge; uvula midline, no oropharyngeal erythema or exudates; MMM  Neck: supple; no JVD or thyromegaly  Respiratory: CTA B/L; no W/R/R, no retractions  Cardiac: +S1/S2; RRR; no M/R/G; PMI non-displaced  Gastrointestinal: soft, NT/ND; no rebound or guarding; +BSx4  Genitourinary: normal external genitalia  Back: spine midline, no bony tenderness or step-offs; no CVAT B/L  Extremities: WWP, no clubbing or cyanosis; no peripheral edema  Musculoskeletal: NROM x4; no joint swelling, tenderness or erythema  Vascular: 2+ radial, femoral, DP/PT pulses B/L  Dermatologic: skin warm, dry and intact; no rashes, wounds, or scars  Lymphatic: no submandibular or cervical LAD  Neurologic: AAOx3; CNII-XII grossly intact; no focal deficits  Psychiatric: affect and characteristics of appearance, verbalizations, behaviors are appropriate    .  LABS:                         13.4   6.43  )-----------( 345      ( 2022 08:58 )             42.2     02-    151<H>  |  115<H>  |  36<H>  ----------------------------<  137<H>  4.2   |  24  |  0.96    Ca    9.4      2022 08:58  Phos  3.7     02-  Mg     2.4     02-25    TPro  7.2  /  Alb  3.6  /  TBili  0.8  /  DBili  x   /  AST  20  /  ALT  11  /  AlkPhos  73      PT/INR - ( 2022 00:56 )   PT: 13.3 sec;   INR: 1.12          PTT - ( 2022 00:56 )  PTT:31.8 sec  Urinalysis Basic - ( 2022 04:33 )    Color: Yellow / Appearance: Clear / S.025 / pH: x  Gluc: x / Ketone: NEGATIVE  / Bili: Negative / Urobili: 0.2 E.U./dL   Blood: x / Protein: Trace mg/dL / Nitrite: NEGATIVE   Leuk Esterase: NEGATIVE / RBC: Many /HPF / WBC < 5 /HPF   Sq Epi: x / Non Sq Epi: 0-5 /HPF / Bacteria: Present /HPF                RADIOLOGY, EKG & ADDITIONAL TESTS: Reviewed.       < from: TTE Echo Complete w/ Contrast w/ Doppler (22 @ 09:08) >     1. Mild symmetric left ventricular hypertrophy.   2. Left ventricularsystolic function is severely reduced with a   calculated ejection fraction of 20-25% with global hypokinesis.   3. The inferolateral wall, the anterolateral wall and apical segments   appear more hypokinetic. No LV thrombus seen.   4. Grade III leftventricular diastolic dysfunction.   5. Normal right ventricular size and systolic function.   6. Aortic sclerosis without significant stenosis.   7. Mild aortic regurgitation.   8. Mild mitral regurgitation.   9. Pulmonary hypertension present, pulmonary artery systolic pressure is   42 mmHg.  10. Small pericardial effusion without echocardiographic evidence of   cardiac tamponade physiology.  11. No prior echo is available for comparison.    < end of copied text >  < from: TTE Echo Complete w/ Contrast w/ Doppler (22 @ 09:08) >  LVIDd (2D):     5.40 cm  (4.2-5.8)     (3.8-5.2)    < end of copied text >  < from: TTE Echo Complete w/ Contrast w/ Doppler (22 @ 09:08) >  Left ventricular systolic function is severely reduced with a calculated   ejection fraction of 20-25% with global hypokinesis. The inferolateral   wall, the anterolateral wall and apical segments appear more hypokinetic.   No LV thrombus seen. Analysis of mitral annular tissue Doppler, left   atrial volume index, and tricuspid regurgitantvelocity reveals: grade   III diastolic dysfunction with elevated filling pressure.    < end of copied text >      < from: TTE Echo Complete w/ Contrast w/ Doppler (22 @ 09:21) >  CONCLUSIONS:     1. Aortic sclerosis without significant stenosis.   2. Mild aortic regurgitation.   3.Mild pulmonic regurgitation.   4. No other significant valvular disease.   5. Normal right ventricular size and systolic function.   6. The left ventricle cavity size is normal. There is mild concentric   left ventricular hypertrophy. Abnormal septalmotion seen due to abnormal   conduction. Left ventricular systolic function is severely reduced with a   calculated ejection fraction of 20% with regional wall motion   abnormalities.   7. Trivial pericardial effusion without echocardiographic evidence of   cardiac tamponade physiology.   8. Bilateral pleural effusion.   9. Compared to the previous TTE performed on 2022, pericardial   effusion appears to have decreased however image quality was better on   prior study.    < end of copied text >  < from: TTE Echo Complete w/ Contrast w/ Doppler (22 @ 09:21) >  eft Ventricle:  The left ventricle cavity size is normal. There is mild concentric left   ventricular hypertrophy. Abnormal septal motion seen due to abnormal   conduction. Left ventricular systolic function is severely reduced with a   calculated ejection fraction of 20% with regional wall motion   abnormalities. Apical segments appear akinetic. Remaining segments are   severely hypokinetic. Analysis of left ventricular diastolic function and   filling pressure is made challenging by the presence of suboptimal mitral   inflow doppler.    < end of copied text >    < from: CT Abdomen and Pelvis No Cont (22 @ 18:55) >   A dominant dense lesion within the left renal   cortex measures up to 2.1 cm.    < end of copied text >  < from: CT Abdomen and Pelvis No Cont (22 @ 18:55) >  Mucosal thickening of the stomach, an irregular   mucosal thickening of the distal stomach not excluding gastritis or an   underlying mass. No    < end of copied text >  < from: CT Abdomen and Pelvis No Cont (22 @ 18:55) >  moderate aorta iliac atherosclerosis.    < end of copied text >  < from: CT Abdomen and Pelvis No Cont (22 @ 18:55) >  illing defect or mass within the posterior right aspect of the urinary   bladder measures up to 1.8 x 1.0 x 1.7 cm.    < end of copied text >  < from: CT Abdomen and Pelvis No Cont (22 @ 18:55) >   A small to moderate right pleural effusionright basilar airspace   disease, COPD type changes.  2. Mucosal thickening of the stomach, an irregular mucosal thickening of   the distal stomach not excluding gastritis or an underlying mass.  3. A residual contrast within the urinary bladder, a filling defect or   mass within the posterior right aspect of the urinary bladder measures up   to 1.8 x 1.0 x 1.7 cm.  4. A dominant dense lesion within the left renal cortex measures up to   2.1 cm. Recommend ultrasound correlation.    < end of copied text >   Patient is a 71 year old Citizen of [place] speaking Male, smoker (with 40 Pack year History with PMHx of ICM (EF 20-25%) 2/2 CAD s/p MIDCAB (LIMA-LAD) on , Grade III DD, prediabetes (A1c 5.9), Bladder and Gastric mass not yet investigated, who presented to OSH with AMS and aphasia, found to have MRI evidence of scattered bilateral acute/subacute infarctions, largest in the right basal ganglia and insular region, as well as in bilateral frontal lobes, left basal ganglia, and right parietal lobe. He underwent cerebral angiogram done as well at OSH which showed moderate stenosis of b/l ICA's and b/l mild stenosis at ICA origins, and moderate to severe stenosis of left subclavian artery. Patient's gastric and bladder mass are being investigated. He will likely require EGD/Colonoscopy    Echo this hospitalization revealed Severely reduced LVSF with EF of 20% with global hypokinesis, LVH     Patient aphasic and history and symptoms are difficult to get even while using interpretor services    Pertinent Cardiac Studies:  CCTA ([facility] [place]) 22: Severe heavily calcified proximal LAD stenosis to distal L Main Artery.  TTE 22: EF 20-25%, Mild LVH, Grade III DD, Normal RV size and function, PASP: 42mmHG  TTE 22: EF 20%, Mild LVH, Normal RV size and Function. Unable to complete diastology assessment      PAST MEDICAL & SURGICAL HISTORY:  Acute on chronic combined systolic and diastolic congestive heart failure  Prediabetes  Smoking hx    Home Medications:  Metoprolol 25 mg PO BID  Lipitor 80 mg Qd  ASA    MEDICATIONS  (STANDING):  aspirin  chewable 81 milliGRAM(s) Oral daily  atorvastatin 80 milliGRAM(s) Oral at bedtime  furosemide   Injectable 10 milliGRAM(s) IV Push daily  heparin   Injectable 5000 Unit(s) SubCutaneous every 12 hours  metoprolol tartrate 12.5 milliGRAM(s) Oral every 12 hours  pantoprazole    Tablet 40 milliGRAM(s) Oral before breakfast  senna 2 Tablet(s) Oral at bedtime    MEDICATIONS  (PRN):  polyethylene glycol 3350 17 Gram(s) Oral daily PRN Constipation      .  VITAL SIGNS:  T(C): 36.4 (22 @ 13:11), Max: 37.1 (22 @ 14:00)  T(F): 97.5 (22 @ 13:11), Max: 98.7 (22 @ 14:00)  HR: 108 (22 @ 12:30) (85 - 128)  BP: 136/87 (22 @ 12:20) (92/61 - 137/93)  BP(mean): 104 (22 @ 12:20) (72 - 109)  RR: 18 (22 @ 12:20) (15 - 18)  SpO2: 100% (22 @ 12:30) (96% - 100%)  Wt(kg): --    PHYSICAL EXAM:     Constitutional: WDWN resting comfortably in bed;   Head: NC/AT  ENT: MMM  Neck: supple; no JVD   Respiratory: CTA B/L; no W/R/R,  Cardiac: +S1/S2; RRR; no M/R/G;   Gastrointestinal: soft, NT/ND; no rebound or guarding; +BS  Extremities: WWP, no clubbing or cyanosis; no peripheral edema  Vascular: 2+ radial, DP/PT pulses B/L      Psychiatric: affect and characteristics of appearance, verbalizations, behaviors are appropriate    .  LABS:                         13.4   6.43  )-----------( 345      ( 2022 08:58 )             42.2     02-25    151<H>  |  115<H>  |  36<H>  ----------------------------<  137<H>  4.2   |  24  |  0.96    Ca    9.4      2022 08:58  Phos  3.7     02-  Mg     2.4     -25    TPro  7.2  /  Alb  3.6  /  TBili  0.8  /  DBili  x   /  AST  20  /  ALT  11  /  AlkPhos  73  02-24    PT/INR - ( 2022 00:56 )   PT: 13.3 sec;   INR: 1.12          PTT - ( 2022 00:56 )  PTT:31.8 sec  Urinalysis Basic - ( 2022 04:33 )    Color: Yellow / Appearance: Clear / S.025 / pH: x  Gluc: x / Ketone: NEGATIVE  / Bili: Negative / Urobili: 0.2 E.U./dL   Blood: x / Protein: Trace mg/dL / Nitrite: NEGATIVE   Leuk Esterase: NEGATIVE / RBC: Many /HPF / WBC < 5 /HPF   Sq Epi: x / Non Sq Epi: 0-5 /HPF / Bacteria: Present /HPF                RADIOLOGY, EKG & ADDITIONAL TESTS: Reviewed.       < from: TTE Echo Complete w/ Contrast w/ Doppler (22 @ 09:08) >     1. Mild symmetric left ventricular hypertrophy.   2. Left ventricularsystolic function is severely reduced with a   calculated ejection fraction of 20-25% with global hypokinesis.   3. The inferolateral wall, the anterolateral wall and apical segments   appear more hypokinetic. No LV thrombus seen.   4. Grade III leftventricular diastolic dysfunction.   5. Normal right ventricular size and systolic function.   6. Aortic sclerosis without significant stenosis.   7. Mild aortic regurgitation.   8. Mild mitral regurgitation.   9. Pulmonary hypertension present, pulmonary artery systolic pressure is   42 mmHg.  10. Small pericardial effusion without echocardiographic evidence of   cardiac tamponade physiology.  11. No prior echo is available for comparison.    < end of copied text >  < from: TTE Echo Complete w/ Contrast w/ Doppler (22 @ 09:08) >  LVIDd (2D):     5.40 cm  (4.2-5.8)     (3.8-5.2)    < end of copied text >  < from: TTE Echo Complete w/ Contrast w/ Doppler (22 @ 09:08) >  Left ventricular systolic function is severely reduced with a calculated   ejection fraction of 20-25% with global hypokinesis. The inferolateral   wall, the anterolateral wall and apical segments appear more hypokinetic.   No LV thrombus seen. Analysis of mitral annular tissue Doppler, left   atrial volume index, and tricuspid regurgitantvelocity reveals: grade   III diastolic dysfunction with elevated filling pressure.    < end of copied text >      < from: TTE Echo Complete w/ Contrast w/ Doppler (22 @ 09:21) >  CONCLUSIONS:     1. Aortic sclerosis without significant stenosis.   2. Mild aortic regurgitation.   3.Mild pulmonic regurgitation.   4. No other significant valvular disease.   5. Normal right ventricular size and systolic function.   6. The left ventricle cavity size is normal. There is mild concentric   left ventricular hypertrophy. Abnormal septalmotion seen due to abnormal   conduction. Left ventricular systolic function is severely reduced with a   calculated ejection fraction of 20% with regional wall motion   abnormalities.   7. Trivial pericardial effusion without echocardiographic evidence of   cardiac tamponade physiology.   8. Bilateral pleural effusion.   9. Compared to the previous TTE performed on 2022, pericardial   effusion appears to have decreased however image quality was better on   prior study.    < end of copied text >  < from: TTE Echo Complete w/ Contrast w/ Doppler (22 @ 09:21) >  eft Ventricle:  The left ventricle cavity size is normal. There is mild concentric left   ventricular hypertrophy. Abnormal septal motion seen due to abnormal   conduction. Left ventricular systolic function is severely reduced with a   calculated ejection fraction of 20% with regional wall motion   abnormalities. Apical segments appear akinetic. Remaining segments are   severely hypokinetic. Analysis of left ventricular diastolic function and   filling pressure is made challenging by the presence of suboptimal mitral   inflow doppler.    < end of copied text >    < from: CT Abdomen and Pelvis No Cont (22 @ 18:55) >   A dominant dense lesion within the left renal   cortex measures up to 2.1 cm.    < end of copied text >  < from: CT Abdomen and Pelvis No Cont (22 @ 18:55) >  Mucosal thickening of the stomach, an irregular   mucosal thickening of the distal stomach not excluding gastritis or an   underlying mass. No    < end of copied text >  < from: CT Abdomen and Pelvis No Cont (22 @ 18:55) >  moderate aorta iliac atherosclerosis.    < end of copied text >  < from: CT Abdomen and Pelvis No Cont (22 @ 18:55) >  illing defect or mass within the posterior right aspect of the urinary   bladder measures up to 1.8 x 1.0 x 1.7 cm.    < end of copied text >  < from: CT Abdomen and Pelvis No Cont (22 @ 18:55) >   A small to moderate right pleural effusionright basilar airspace   disease, COPD type changes.  2. Mucosal thickening of the stomach, an irregular mucosal thickening of   the distal stomach not excluding gastritis or an underlying mass.  3. A residual contrast within the urinary bladder, a filling defect or   mass within the posterior right aspect of the urinary bladder measures up   to 1.8 x 1.0 x 1.7 cm.  4. A dominant dense lesion within the left renal cortex measures up to   2.1 cm. Recommend ultrasound correlation.    < end of copied text >

## 2022-02-25 NOTE — PROGRESS NOTE ADULT - SUBJECTIVE AND OBJECTIVE BOX
CONSULT: 71M w/ acute MCA CVA s/p RA MIDCAB w/ incidental finding of 1.4 cm filling defect in bladder concerning for tumor and hyperdense renal lesion on CTAP.    SUBJECTIVE: Patient seen and examined bedside. Spoke to him in Lao with limited response due to aphasia. No apparent distress. No fevers, chills, nausea, vomiting. Exam below.    HPI:  71 year old Lao speaking male, smoker (very recently quit, smoked x 40 years) with PMHx of CHF (EF 25-30%) and prediabetes (A1c 5.9) who is now s/p robotic MIDCAB with Dr. Santos on 2022. He was discharged home on POD 4.  On 2022 patient presented to OSH with AMS. Work up included CT without, CTA Head and Neck and MRI Jerry showing acute MCA CVA, b/l acute inracts  with the largest in the right basal ganglia and insular region. EEG negative for seizure. TTE = ? PFO. Patient transfer to [facility] for further MGMT and work up.  Called to see patient for incidental finding of 1.4 cm bladder lesion. Attempted to get history from patient with  at bedside but patient unable to answer any questions.     aspirin  chewable 81 milliGRAM(s) Oral daily  heparin   Injectable 5000 Unit(s) SubCutaneous every 12 hours  metoprolol succinate ER 25 milliGRAM(s) Oral daily      Vital Signs Last 24 Hrs  T(C): 36.7 (2022 06:00), Max: 36.8 (2022 21:30)  T(F): 98 (2022 06:00), Max: 98.2 (2022 21:30)  HR: 110 (2022 05:38) (102 - 112)  BP: 125/87 (2022 05:38) (106/81 - 138/81)  BP(mean): 102 (2022 05:38) (90 - 105)  RR: 16 (2022 05:38) (16 - 20)  SpO2: 100% (2022 05:38) (100% - 100%)  I&O's Detail    2022 07:01  -  2022 07:00  --------------------------------------------------------  IN:  Total IN: 0 mL    OUT:    Indwelling Catheter - Urethral (mL): 700 mL  Total OUT: 700 mL    Total NET: -700 mL          PHYSICAL EXAM    General: NAD, resting comfortably in bed  C/V: NSR  Pulm: Nonlabored breathing, no respiratory distress on room air  Abd: soft, NTND, appropriate incisional tenderness, no rebound tenderness, no guarding  Extrem: WWP, no edema, SCDs in place        LABS:                        13.1   5.23  )-----------( 305      ( 2022 06:20 )             40.1     02-    150<H>  |  117<H>  |  29<H>  ----------------------------<  117<H>  4.0   |  22  |  0.84    Ca    9.4      2022 06:20  Phos  3.7     02-  Mg     2.4     -    TPro  7.0  /  Alb  3.4  /  TBili  0.5  /  DBili  x   /  AST  21  /  ALT  11  /  AlkPhos  66  -      Urinalysis Basic - ( 2022 04:33 )    Color: Yellow / Appearance: Clear / S.025 / pH: x  Gluc: x / Ketone: NEGATIVE  / Bili: Negative / Urobili: 0.2 E.U./dL   Blood: x / Protein: Trace mg/dL / Nitrite: NEGATIVE   Leuk Esterase: NEGATIVE / RBC: Many /HPF / WBC < 5 /HPF   Sq Epi: x / Non Sq Epi: 0-5 /HPF / Bacteria: Present /HPF        RADIOLOGY & ADDITIONAL STUDIES:

## 2022-02-25 NOTE — PROGRESS NOTE ADULT - SUBJECTIVE AND OBJECTIVE BOX
Patient discussed on morning rounds with Dr. Santos     Operation / Date: 22 uncomplicated robotic assisted MIDCAB    SUBJECTIVE ASSESSMENT:  71y Male seen and examined. Patient lethargic but awake, limited in responding. Tolerating soft bite sized, mildly thick liquid diet, satting well on room air.      Vital Signs Last 24 Hrs  T(C): 36.4 (2022 13:11), Max: 36.9 (2022 17:38)  T(F): 97.5 (2022 13:11), Max: 98.5 (2022 17:38)  HR: 108 (2022 12:30) (92 - 128)  BP: 136/87 (2022 12:20) (92/61 - 137/93)  BP(mean): 104 (2022 12:20) (72 - 109)  RR: 18 (2022 12:20) (18 - 18)  SpO2: 100% (2022 12:30) (96% - 100%)  I&O's Detail    2022 07:01  -  2022 07:00  --------------------------------------------------------  IN:    IV PiggyBack: 100 mL  Total IN: 100 mL    OUT:    Indwelling Catheter - Urethral (mL): 175 mL  Total OUT: 175 mL    Total NET: -75 mL      2022 07:01  -  2022 16:04  --------------------------------------------------------  IN:  Total IN: 0 mL    OUT:    Indwelling Catheter - Urethral (mL): 400 mL  Total OUT: 400 mL  Total NET: -400 mL    CHEST TUBE:  No  KERMIT DRAIN:  No.  EPICARDIAL WIRES: No.  TIE DOWNS: No.  AGUILAR: Yes.    PHYSICAL EXAM:    General: Cacechtic, NAD, sitting comfortably in bed, aphasic  Neurological: A&Ox0, lethargic, RUE and RLE strength 4/5, LUE and LLE strength 2/5, facial asymmetry  Cardiovascular: RRR, Clear S1 and S2, no murmurs appreciated  Respiratory: chest expansion symmetrical, CTA b/l, no wheezing noted  Gastrointestinal: +BS, soft, NT, ND  Extremities: moving spontaneously, no calf tenderness or edema.  Vascular: warm, well perfused. DP/PT pulses palpable b/l.  Incisions: L thoracotomy C/D/I; all tie downs removed; dermabond peeling off    LABS:                        13.4   6.43  )-----------( 345      ( 2022 08:58 )             42.2     COUMADIN:  No.      PT/INR - ( 2022 00:56 )   PT: 13.3 sec;   INR: 1.12          PTT - ( 2022 00:56 )  PTT:31.8 sec    02-    151<H>  |  115<H>  |  36<H>  ----------------------------<  137<H>  4.2   |  24  |  0.96    Ca    9.4      2022 08:58  Phos  3.7     02-25  Mg     2.4     -25    TPro  7.2  /  Alb  3.6  /  TBili  0.8  /  DBili  x   /  AST  20  /  ALT  11  /  AlkPhos  73  02-24      Urinalysis Basic - ( 2022 04:33 )    Color: Yellow / Appearance: Clear / S.025 / pH: x  Gluc: x / Ketone: NEGATIVE  / Bili: Negative / Urobili: 0.2 E.U./dL   Blood: x / Protein: Trace mg/dL / Nitrite: NEGATIVE   Leuk Esterase: NEGATIVE / RBC: Many /HPF / WBC < 5 /HPF   Sq Epi: x / Non Sq Epi: 0-5 /HPF / Bacteria: Present /HPF    MEDICATIONS  (STANDING):  aspirin  chewable 81 milliGRAM(s) Oral daily  atorvastatin 80 milliGRAM(s) Oral at bedtime  furosemide   Injectable 10 milliGRAM(s) IV Push daily  heparin   Injectable 5000 Unit(s) SubCutaneous every 12 hours  metoprolol tartrate 12.5 milliGRAM(s) Oral every 12 hours  pantoprazole    Tablet 40 milliGRAM(s) Oral before breakfast  senna 2 Tablet(s) Oral at bedtime    MEDICATIONS  (PRN):  polyethylene glycol 3350 17 Gram(s) Oral daily PRN Constipation        RADIOLOGY & ADDITIONAL TESTS:  < from: Xray Chest 1 View- PORTABLE-Routine (Xray Chest 1 View- PORTABLE-Routine in AM.) (22 @ 04:59) >    FINDINGS:  Stable cardiomediastinal and hilar silhouettes. The lungs are clear.   Probable trace bilateral pleural effusions. No pneumothorax. No acute   abnormalities of the soft tissues and osseous structures.      IMPRESSION:  Probable trace bilateral pleural effusions.    < end of copied text >

## 2022-02-25 NOTE — PROGRESS NOTE ADULT - ASSESSMENT
Assesment:  71 year old noncompliant Croatian speaking male, smoker (very recently quit, smoked x 40 years) with PMHx of CHF (EF 25-30%) and prediabetes (A1c 5.9) who is now s/p robotic MIDCAB with Dr. Santos on 02/09/2022.  Post OR he was extubated on POD 0, started on beta blockers by POD 2 and transferred to telemetry.  By POD 4 he was ambulating independently, on room air, having normal BM's, tolerating PO diet with surgical pain under control. He was discharged home on POD 4.  On 02/20/2022 patient presented to OSH with AMS. Work up included CT without contrast, CTA Head and Neck, and MRI Jerry showing acute MCA CVA with b/l acute inracts and the largest in the right basal ganglia and insular region. EEG negative for seizure. TTE = ? PFO. Patient transfer to [facility] for further management and workup. On 2/24 the stroke team was consulted for recent stroke findings. He failed a dysphagia screen, SLP came and approved soft, bite sized diet with mildly thick liquids. CT A/P completed on 2/8 showed bladder mass and ulceration to antrum of stomach (without drop in H/H) and GI and  were consulted. TTE completed w/definity and showed EF of 20%. Patient was transferred to neurology/stroke team service with [facility] following as a consult. Patient remains stable from a cardiac standpoint at this time.    Plan:  Problem 1: CAD s/p MIDCAB 2/9/22  -c/w ASA  -continue metoprolol  -continue atorvastatin  -continue lasix   -Hemodynamically stable.   -Monitor: BP, HR, tele  -CTS will continue following    Problem 2: MCA CVA 2/20/22 at OSH  -multiple strokes, s/f embolic  -transferred to neuro/stroke team  -management per neuro team  -aphasic  -SLP eval for dysphagia- cleared for small bites, mildly thick liquids    Problem 3: GI ulceration vs antral mass  -planned EGD/EUS with GI Monday  -appreciate GI recs  -care per primary team    Problem 4: bladder mass  -urology consulted, appreciate recs  -evans in place  -MRI vs CT renal protocol pending  -UA w/cytology for possible malignancy  -outpatient cystoscopy recommended    CTS will continue to follow along    I have reviewed clinical labs tests and reports, radiology tests and reports, as well as old patient medical records, and discussed with the refering physician.

## 2022-02-25 NOTE — CONSULT NOTE ADULT - ATTENDING COMMENTS
The patient is a 71-year-old Faroese speaking male with a history of recent tobacco dependence, CHF with reduced ejection fraction (25 to 30%), prediabetes, and CAD status post robotic MIDCAB 2//9/2022.  He was discharged neurologically intact.  He presented 2/20/2022 with altered mental status with work-up revealing multifocal, bilateral acute and subacute infarcts.  CTA head and neck showed significant intracranial athero but no significant carotid disease.  We do not have these images for review.  EEG was negative for seizure.  TTE showed possible PFO though this was not appreciated on echocardiogram performed at [facility].  He was transferred for further evaluation and management. Recent LDL 90, A1c 5.2     We will attempt to obtain outside images for review.  However, based on description, it seems likely the patient had embolic strokes.  It seems less likely the patient's symptoms were directly related to his recent procedure as he was neurologically intact prior to discharge.  We will need to evaluate for occult A. fib and plan to obtain repeat TTE (with bubble) and JOEY.  He may require ILR placement. If PFO is present, he will need DVT US. There has also been concern for possible GI and  masses which raise the possibility of malignancy driving a hypercoagulable state.  Appreciate GI and  consultations.  For now, will continue aspirin alone given potential need for biopsy. Continue statin. Transfer to stroke.
Patient seen and discussed with fellow plan as noted
72 YO M with a history of ACC/AHA Stage C ICM (LV 4.7 cm, LVEF 20-25%), CAD s/p MIDCAB [place] 2/9/22 by Dr. Santos, and likely gastric malignancy who was admitted one week after discharge from surgery with a CVA (multiple embolic appearing strokes in setting of significant cerebral atherosclerosis) with residual expressive aphasia. He had severe LV dysfunction pre-operatively which appears unchanged at this time. He is euvolemic on exam and hypertensive with room for uptitration of neurohormonal therapy if permissive HTN window able to be lifted.    # Chronic systolic heart failure  - GDMT: start Entresto 24-26 mg BID if OK per neurology. Switch metoprolol to succinate formulation. Eventual MRA  - Diuretics: stop lasix as he is euvolemic/dry with sinus tachycardia.   - Device: needs reassessment of LVEF 3 months after GDMT/revasc    # CAD  - continue ASA, high potency statin, and beta blocker    # CVA  - management per neurology  - consider empiric anticoagulation given high risk of subclinical AF

## 2022-02-25 NOTE — CONSULT NOTE ADULT - SUBJECTIVE AND OBJECTIVE BOX
HPI "This is a 71 year old Tuvaluan speaking male, smoker (very recently quit, smoked x 40 years) with PMHx of CHF (EF 25-30%) and prediabetes (A1c 5.9) who is now s/p robotic MIDCAB with Dr. Santos on 2022.  Post OR he was extubated on POD 0, started on beta blockers by POD 2 and transferred to telemetry.  By POD 4 he was ambulating independently, on room air, having normal BM's, tolerating PO diet with surgical pain under control. He was discharged home on POD 4.  On 2022 patient presented to OSH with AMS. Work up included CT without, CTA Head and Neck and MRI Jerry showing acute MCA CVA, b/l acute inracts  with the largest in the right basal ganglia and insular region. EEG negative for seizure. TTE = ? PFO. Patient transfer to [facility] for further MGMT and work up.  "    71M Tuvaluan speaker, HFrEF (25-30%), Pre-DM (5.9), s/p robotic MidCab 22 at [facility], recently dc who presented to OSH  w AMS found to have acute MCA CVA, b/l acute infarctions largest in R basal ganglia and insular region - transferred to [facility] for further management - found also to have hypernatremia, GI ulceration for EGD/EUS , and Bladder mass c/f malignancy     used for visit  Pt able to state name as well as nod/shake head, move all extremities on command. However has decreased fluency and not able to state yes/no consistently or answer open-ended questions. He does deny any chest pain, abd pain, nausea. Unable to complete full ROS due to mentation.        PAST MEDICAL & SURGICAL HISTORY:  Acute on chronic combined systolic and diastolic congestive heart failure    Prediabetes    Smoking hx      Home Meds: Home Medications:    Allergies: Allergies    No Known Allergies    Intolerances      Soc:   Advanced Directives: Presumed Full Code     CURRENT MEDICATIONS:   --------------------------------------------------------------------------------------  Neurologic Medications    Respiratory Medications    Cardiovascular Medications  metoprolol succinate ER 25 milliGRAM(s) Oral daily    Gastrointestinal Medications  pantoprazole    Tablet 40 milliGRAM(s) Oral before breakfast  polyethylene glycol 3350 17 Gram(s) Oral daily PRN Constipation  senna 2 Tablet(s) Oral at bedtime    Genitourinary Medications    Hematologic/Oncologic Medications  aspirin  chewable 81 milliGRAM(s) Oral daily  heparin   Injectable 5000 Unit(s) SubCutaneous every 12 hours    Antimicrobial/Immunologic Medications    Endocrine/Metabolic Medications  atorvastatin 80 milliGRAM(s) Oral at bedtime    Topical/Other Medications    --------------------------------------------------------------------------------------    VITAL SIGNS, INS/OUTS (last 24 hours):  --------------------------------------------------------------------------------------  ICU Vital Signs Last 24 Hrs  T(C): 36.6 (2022 17:15), Max: 36.8 (2022 01:00)  T(F): 97.8 (2022 17:15), Max: 98.3 (2022 01:00)  HR: 110 (2022 15:57) (92 - 110)  BP: 138/81 (2022 15:57) (130/96 - 138/81)  BP(mean): 102 (2022 15:57) (102 - 109)  ABP: --  ABP(mean): --  RR: 18 (2022 15:57) (18 - 18)  SpO2: 100% (2022 15:57) (96% - 100%)    I&O's Summary    2022 07:01  -  2022 07:00  --------------------------------------------------------  IN: 100 mL / OUT: 175 mL / NET: -75 mL    2022 07:01  -  2022 19:46  --------------------------------------------------------  IN: 0 mL / OUT: 400 mL / NET: -400 mL      --------------------------------------------------------------------------------------    EXAM:  GEN: Thin Male in NAD on RA  HEENT: NC/AT, MMM, dry mucous membranes  CV: RRR, nml S1S2, no murmurs  PULM: nml effort, CTAB wo rales, rhonchi, or wheezing  ABD: Soft, non-distended, NABS, non-tender  NEURO  A/O x self. Did not answer question to location or date., moving all extremities - some weakness on L side.   PSYCH: Appropriate    LABS  --------------------------------------------------------------------------------------  Labs:  CAPILLARY BLOOD GLUCOSE                              13.4   6.43  )-----------( 345      ( 2022 08:58 )             42.2         02    151<H>  |  115<H>  |  36<H>  ----------------------------<  137<H>  4.2   |  24  |  0.96      Calcium, Total Serum: 9.4 mg/dL (22 @ 08:58)      LFTs:             7.2  | 0.8  | 20       ------------------[73      ( 2022 00:56 )  3.6  | x    | 11          Lipase:x      Amylase:x             Coags:     13.3   ----< 1.12    ( 2022 00:56 )     31.8                Urinalysis Basic - ( 2022 04:33 )    Color: Yellow / Appearance: Clear / S.025 / pH: x  Gluc: x / Ketone: NEGATIVE  / Bili: Negative / Urobili: 0.2 E.U./dL   Blood: x / Protein: Trace mg/dL / Nitrite: NEGATIVE   Leuk Esterase: NEGATIVE / RBC: Many /HPF / WBC < 5 /HPF   Sq Epi: x / Non Sq Epi: 0-5 /HPF / Bacteria: Present /HPF          --------------------------------------------------------------------------------------    OTHER LABS    IMAGING RESULTS  ****************

## 2022-02-25 NOTE — PROGRESS NOTE ADULT - ASSESSMENT
71M w/ acute MCA CVA s/p RA MIDCAB w/ incidental finding of 1.4 cm filling defect in bladder concerning for tumor and hyperdense renal lesion on CTAP    1. Recommend urine cytology  2. Recommend CT or MRI renal mass protocol to evaluate renal lesion  3. Recommend outpatient follow up with urology for cystoscopic evaluation of mass  4. Plan discussed with  on call attending and rounding attending

## 2022-02-25 NOTE — PROGRESS NOTE ADULT - SUBJECTIVE AND OBJECTIVE BOX
Neurology Stroke Progress Note    INTERVAL HPI/OVERNIGHT EVENTS:  Patient seen and examined.  number ______ used.    MEDICATIONS  (STANDING):  aspirin  chewable 81 milliGRAM(s) Oral daily  atorvastatin 80 milliGRAM(s) Oral at bedtime  furosemide   Injectable 10 milliGRAM(s) IV Push daily  heparin   Injectable 5000 Unit(s) SubCutaneous every 12 hours  metoprolol tartrate 12.5 milliGRAM(s) Oral every 12 hours  pantoprazole    Tablet 40 milliGRAM(s) Oral before breakfast  senna 2 Tablet(s) Oral at bedtime    MEDICATIONS  (PRN):  polyethylene glycol 3350 17 Gram(s) Oral daily PRN Constipation      Allergies    No Known Allergies    Intolerances        Vital Signs Last 24 Hrs  T(C): 36.8 (2022 06:55), Max: 37.1 (2022 14:00)  T(F): 98.2 (2022 06:55), Max: 98.7 (2022 14:00)  HR: 104 (2022 08:35) (80 - 128)  BP: 130/96 (2022 08:35) (92/61 - 137/93)  BP(mean): 108 (2022 08:35) (72 - 109)  RR: 18 (2022 08:35) (14 - 24)  SpO2: 100% (2022 08:35) (96% - 100%)    Physical exam:  General: No acute distress, awake and alert  Eyes: Anicteric sclerae, moist conjunctivae, see below for CNs  Neck: trachea midline, FROM, supple, no thyromegaly or lymphadenopathy  Cardiovascular: Regular rate and rhythm, no murmurs, rubs, or gallops. No carotid bruits.   Pulmonary: Anterior breath sounds clear bilaterally, no crackles or wheezing. No use of accessory muscles  GI: Abdomen soft, non-distended, non-tender  Extremities: Radial and DP pulses +2, no edema    Neurologic:  -Mental status: Awake, alert, oriented to person, place, and time. Speech is fluent with intact naming, repetition, and comprehension, no dysarthria. Recent and remote memory intact. Follows commands. Attention/concentration intact. Fund of knowledge appropriate.  -Cranial nerves:   II: Visual fields are full to confrontation.  III, IV, VI: Extraocular movements are intact without nystagmus. Pupils equally round and reactive to light  V:  Facial sensation V1-V3 equal and intact   VII: Face is symmetric with normal eye closure and smile  VIII: Hearing is bilaterally intact to finger rub  IX, X: Uvula is midline and soft palate rises symmetrically  XI: Head turning and shoulder shrug are intact.  XII: Tongue protrudes midline  Motor: Normal bulk and tone. No pronator drift. Strength bilateral upper extremity 5/5, bilateral lower extremities 5/5.  Rapid alternating movements intact and symmetric  Sensation: Intact to light touch bilaterally. No neglect or extinction on double simultaneous testing.  Coordination: No dysmetria on finger-to-nose and heel-to-shin bilaterally  Reflexes: Downgoing toes bilaterally   Gait: Narrow gait and steady    LABS:                        13.4   6.43  )-----------( 345      ( 2022 08:58 )             42.2     02-24    150<H>  |  113<H>  |  31<H>  ----------------------------<  68<L>  3.5   |  21<L>  |  0.91    Ca    9.4      2022 06:19  Phos  4.3     02-24  Mg     2.4     02-24    TPro  7.2  /  Alb  3.6  /  TBili  0.8  /  DBili  x   /  AST  20  /  ALT  11  /  AlkPhos  73  02-24    PT/INR - ( 2022 00:56 )   PT: 13.3 sec;   INR: 1.12          PTT - ( 2022 00:56 )  PTT:31.8 sec  Urinalysis Basic - ( 2022 04:33 )    Color: Yellow / Appearance: Clear / S.025 / pH: x  Gluc: x / Ketone: NEGATIVE  / Bili: Negative / Urobili: 0.2 E.U./dL   Blood: x / Protein: Trace mg/dL / Nitrite: NEGATIVE   Leuk Esterase: NEGATIVE / RBC: Many /HPF / WBC < 5 /HPF   Sq Epi: x / Non Sq Epi: 0-5 /HPF / Bacteria: Present /HPF        RADIOLOGY & ADDITIONAL TESTS:     Neurology Stroke Progress Note    INTERVAL HPI/OVERNIGHT EVENTS:  Patient seen and examined.  number ______ used.    MEDICATIONS  (STANDING):  aspirin  chewable 81 milliGRAM(s) Oral daily  atorvastatin 80 milliGRAM(s) Oral at bedtime  furosemide   Injectable 10 milliGRAM(s) IV Push daily  heparin   Injectable 5000 Unit(s) SubCutaneous every 12 hours  metoprolol tartrate 12.5 milliGRAM(s) Oral every 12 hourscc  pantoprazole    Tablet 40 milliGRAM(s) Oral before breakfast  senna 2 Tablet(s) Oral at bedtime    MEDICATIONS  (PRN):  polyethylene glycol 3350 17 Gram(s) Oral daily PRN Constipation      Allergies    No Known Allergies    Intolerances        Vital Signs Last 24 Hrs  T(C): 36.8 (2022 06:55), Max: 37.1 (2022 14:00)  T(F): 98.2 (2022 06:55), Max: 98.7 (2022 14:00)  HR: 104 (2022 08:35) (80 - 128)  BP: 130/96 (2022 08:35) (92/61 - 137/93)  BP(mean): 108 (2022 08:35) (72 - 109)  RR: 18 (2022 08:35) (14 - 24)  SpO2: 100% (2022 08:35) (96% - 100%)    Physical exam:  General: No acute distress, awake and alert  Eyes: Anicteric sclerae, moist conjunctivae, see below for CNs  Neck: trachea midline, FROM, supple, no thyromegaly or lymphadenopathy  Cardiovascular: Regular rate and rhythm, no murmurs, rubs, or gallops. No carotid bruits.   Pulmonary: Anterior breath sounds clear bilaterally, no crackles or wheezing. No use of accessory muscles  GI: Abdomen soft, non-distended, non-tender  Extremities: Radial and DP pulses +2, no edema    Neurologic:  -Mental status: Awake, alert, oriented to person, place, and time. Speech is fluent with intact naming, repetition, and comprehension, no dysarthria. Recent and remote memory intact. Follows commands. Attention/concentration intact. Fund of knowledge appropriate.  -Cranial nerves:   II: Visual fields are full to confrontation.  III, IV, VI: Extraocular movements are intact without nystagmus. Pupils equally round and reactive to light  V:  Facial sensation V1-V3 equal and intact   VII: Face is symmetric with normal eye closure and smile  VIII: Hearing is bilaterally intact to finger rub  IX, X: Uvula is midline and soft palate rises symmetrically  XI: Head turning and shoulder shrug are intact.  XII: Tongue protrudes midline  Motor: Normal bulk and tone. No pronator drift. Strength bilateral upper extremity 5/5, bilateral lower extremities 5/5.  Rapid alternating movements intact and symmetric  Sensation: Intact to light touch bilaterally. No neglect or extinction on double simultaneous testing.  Coordination: No dysmetria on finger-to-nose and heel-to-shin bilaterally  Reflexes: Downgoing toes bilaterally   Gait: Narrow gait and steady    LABS:                        13.4   6.43  )-----------( 345      ( 2022 08:58 )             42.2     02-24    150<H>  |  113<H>  |  31<H>  ----------------------------<  68<L>  3.5   |  21<L>  |  0.91    Ca    9.4      2022 06:19  Phos  4.3     02-24  Mg     2.4     -24    TPro  7.2  /  Alb  3.6  /  TBili  0.8  /  DBili  x   /  AST  20  /  ALT  11  /  AlkPhos  73  02-24    PT/INR - ( 2022 00:56 )   PT: 13.3 sec;   INR: 1.12          PTT - ( 2022 00:56 )  PTT:31.8 sec  Urinalysis Basic - ( 2022 04:33 )    Color: Yellow / Appearance: Clear / S.025 / pH: x  Gluc: x / Ketone: NEGATIVE  / Bili: Negative / Urobili: 0.2 E.U./dL   Blood: x / Protein: Trace mg/dL / Nitrite: NEGATIVE   Leuk Esterase: NEGATIVE / RBC: Many /HPF / WBC < 5 /HPF   Sq Epi: x / Non Sq Epi: 0-5 /HPF / Bacteria: Present /HPF        RADIOLOGY & ADDITIONAL TESTS:     Neurology Stroke Progress Note    INTERVAL HPI/OVERNIGHT EVENTS:  Patient seen and examined.  number [id number] used. The patient did not complain from any new issues, fairly cooperative.     MEDICATIONS  (STANDING):  aspirin  chewable 81 milliGRAM(s) Oral daily  atorvastatin 80 milliGRAM(s) Oral at bedtime  furosemide   Injectable 10 milliGRAM(s) IV Push daily  heparin   Injectable 5000 Unit(s) SubCutaneous every 12 hours  metoprolol tartrate 12.5 milliGRAM(s) Oral every 12 hours  pantoprazole    Tablet 40 milliGRAM(s) Oral before breakfast  senna 2 Tablet(s) Oral at bedtime    MEDICATIONS  (PRN):  polyethylene glycol 3350 17 Gram(s) Oral daily PRN Constipation      Allergies    No Known Allergies    Intolerances        Vital Signs Last 24 Hrs  T(C): 36.8 (2022 06:55), Max: 37.1 (2022 14:00)  T(F): 98.2 (2022 06:55), Max: 98.7 (2022 14:00)  HR: 104 (2022 08:35) (80 - 128)  BP: 130/96 (2022 08:35) (92/61 - 137/93)  BP(mean): 108 (2022 08:35) (72 - 109)  RR: 18 (2022 08:35) (14 - 24)  SpO2: 100% (2022 08:35) (96% - 100%)    Physical exam:  General: No acute distress, awake and alert  Eyes: Anicteric sclerae, moist conjunctivae, see below for CNs  Neck: trachea midline, FROM, supple, no thyromegaly or lymphadenopathy  Cardiovascular: Regular rate and rhythm, no murmurs, rubs, or gallops. No carotid bruits. occasional extra beats  Pulmonary: Anterior breath sounds clear bilaterally, no crackles or wheezing. No use of accessory muscles  GI: Abdomen soft, non-distended, non-tender  Extremities: Radial and DP pulses +2, no edema    Neurologic:  -Mental status: Awake, alert, oriented to his name and age. Speech is not fluent and it was difficult to communicate even with . The patient was not able to name   -Cranial nerves:   II: Visual fields are full to confrontation.  III, IV, VI: Extraocular movements are intact without nystagmus. Pupils equally round and reactive to light  V:  could not be assessed   VII: Lt facial weakness  VIII: Hearing is bilaterally intact roughly   IX, X: Uvula is midline and soft palate rises symmetrically  XI: Head turning and shoulder shrug are intact.  XII: Tongue protrudes midline  Motor: decreased bulk and normal tone. 5/5 Rt side with no drift, 4/5 on the Lt with drift on both   Rapid alternating movements intact and symmetric  Sensation: Intact for noxious stimuli   Coordination: No tremor or abnormal movement   Gait: Not assessed because of weakness    LABS:                        13.4   6.43  )-----------( 345      ( 2022 08:58 )             42.2     02-    150<H>  |  113<H>  |  31<H>  ----------------------------<  68<L>  3.5   |  21<L>  |  0.91    Ca    9.4      2022 06:19  Phos  4.3     -  Mg     2.4         TPro  7.2  /  Alb  3.6  /  TBili  0.8  /  DBili  x   /  AST  20  /  ALT  11  /  AlkPhos  73  02-24    PT/INR - ( 2022 00:56 )   PT: 13.3 sec;   INR: 1.12          PTT - ( 2022 00:56 )  PTT:31.8 sec  Urinalysis Basic - ( 2022 04:33 )    Color: Yellow / Appearance: Clear / S.025 / pH: x  Gluc: x / Ketone: NEGATIVE  / Bili: Negative / Urobili: 0.2 E.U./dL   Blood: x / Protein: Trace mg/dL / Nitrite: NEGATIVE   Leuk Esterase: NEGATIVE / RBC: Many /HPF / WBC < 5 /HPF   Sq Epi: x / Non Sq Epi: 0-5 /HPF / Bacteria: Present /HPF        RADIOLOGY & ADDITIONAL TESTS:

## 2022-02-25 NOTE — PROGRESS NOTE ADULT - SUBJECTIVE AND OBJECTIVE BOX
GASTROENTEROLOGY PROGRESS NOTE  Patient seen and examined at bedside. No acute events reported overnight.    PERTINENT REVIEW OF SYSTEMS:  Patient nodding but not fully answering questions with     Allergies    No Known Allergies    Intolerances      MEDICATIONS:  MEDICATIONS  (STANDING):  aspirin  chewable 81 milliGRAM(s) Oral daily  atorvastatin 80 milliGRAM(s) Oral at bedtime  furosemide   Injectable 10 milliGRAM(s) IV Push daily  heparin   Injectable 5000 Unit(s) SubCutaneous every 12 hours  metoprolol tartrate 12.5 milliGRAM(s) Oral every 12 hours  pantoprazole    Tablet 40 milliGRAM(s) Oral before breakfast  senna 2 Tablet(s) Oral at bedtime    MEDICATIONS  (PRN):  polyethylene glycol 3350 17 Gram(s) Oral daily PRN Constipation    Vital Signs Last 24 Hrs  T(C): 36.6 (2022 09:47), Max: 37.1 (2022 14:00)  T(F): 97.9 (2022 09:47), Max: 98.7 (2022 14:00)  HR: 104 (2022 08:35) (80 - 128)  BP: 130/96 (2022 08:35) (92/61 - 137/93)  BP(mean): 108 (2022 08:35) (72 - 109)  RR: 18 (2022 08:35) (14 - 24)  SpO2: 100% (2022 08:35) (96% - 100%)     @ 07:01  -   @ 07:00  --------------------------------------------------------  IN: 100 mL / OUT: 175 mL / NET: -75 mL      PHYSICAL EXAM:    General: NAD  HEENT: MMM, conjunctiva and sclera clear  Gastrointestinal: Thin, scaphoid, non-tender  Skin: Warm and dry. No obvious rash    LABS:                        13.4   6.43  )-----------( 345      ( 2022 08:58 )             42.2         151<H>  |  115<H>  |  36<H>  ----------------------------<  137<H>  4.2   |  24  |  0.96    Ca    9.4      2022 08:58  Phos  3.7       Mg     2.4         TPro  7.2  /  Alb  3.6  /  TBili  0.8  /  DBili  x   /  AST  20  /  ALT  11  /  AlkPhos  73      PT/INR - ( 2022 00:56 )   PT: 13.3 sec;   INR: 1.12          PTT - ( 2022 00:56 )  PTT:31.8 sec      Urinalysis Basic - ( 2022 04:33 )    Color: Yellow / Appearance: Clear / S.025 / pH: x  Gluc: x / Ketone: NEGATIVE  / Bili: Negative / Urobili: 0.2 E.U./dL   Blood: x / Protein: Trace mg/dL / Nitrite: NEGATIVE   Leuk Esterase: NEGATIVE / RBC: Many /HPF / WBC < 5 /HPF   Sq Epi: x / Non Sq Epi: 0-5 /HPF / Bacteria: Present /HPF                RADIOLOGY & ADDITIONAL STUDIES:  Reviewed

## 2022-02-25 NOTE — PROGRESS NOTE ADULT - ATTENDING COMMENTS
The patient was seen and examined with the resident. The patient's films were also personally reviewed.   His bladder mass is definitely suspicious but given his acute stroke, he is not a candidate for treatment at this time.  Would hold off on any diagnostic test that does not  at this time. Agree with cytology  Please note: pt speaks Turkish and pt was seen with Greenlandic speaking resident. Pt is non-verbal but expressed limited understanding.

## 2022-02-25 NOTE — PROGRESS NOTE ADULT - ASSESSMENT
71y Frisian-speaking M, smoker, Hx of CHF, PreDM, s/p robotic MIDCAB in early February, presenting now after found down with new infarcts. GI consulted for abnormal imaging.    Abnormal imaging - Patient with CT A/P on 2/8 during prior hospitalization demonstrating possible antral thickening/gastric mass. Patient now returning with CVA. GI consulted for imaging findings as patient easily lost to follow-up. Patient not speaking during examination despite use of  [email or web address] [id number]), limiting history of any previous endoscopic evaluations.  - Patient would benefit from EGD +/- EUS of CT findings  - Will need to obtain consent for any procedure from next of kin or HCP  - Will need Neurologic evaluation/optimization and recommendations prior to any endoscopic procedure  - Needs optimization of electrolytes (Na of 151)  - Tentative plan for EGD +/- EUS next week    Recommendations discussed with primary team  Case discussed with attending physician

## 2022-02-25 NOTE — CONSULT NOTE ADULT - ASSESSMENT
71 year old [place] Leonean speaking Male, smoker (with 40 Pack year History with PMHx of ICM (EF 20-25%) 2/2 CAD s/p MIDCAB (LIMA-LAD) on 2/22, Grade III DD, prediabetes (A1c 5.9), Bladder and Gastric mass not yet investigated, who presented to OSH with AMS and aphasia, found to have MRI evidence of scattered bilateral acute/subacute infarctions, largest in the right basal ganglia and insular region, as well as in bilateral frontal lobes, left basal ganglia, and right parietal lobe. He underwent cerebral angiogram done as well at OSH which showed moderate stenosis of b/l ICA's and b/l mild stenosis at ICA origins, and moderate to severe stenosis of left subclavian artery.     Echo this hospitalization revealed Severely reduced LVSF with EF of 20% with global hypokinesis, LVH     Pertinent Cardiac Studies:  CCTA ([facility] [place]) 02/07/22: Severe heavily calcified proximal LAD stenosis to distal L Main Artery.  TTE 02/09/22: EF 20-25%, Mild LVH, Grade III DD, Normal RV size and function, PASP: 42mmHG  TTE 02/25/22: EF 20%, Mild LVH, Normal RV size and Function. Unable to complete diastology assessment    1) Ischemic Cardiomyopathy(EF 20%) and dCHF  -Pt at ACC/AHA Stage C NYHA class   -    2) Atherosclerotic CVD  -Evidence of CAD (S/p LIMA-LAD) and Cerebrovascular disease (Multiple strokes)  -A1c of 5.4 and LDL of 90  -Cont with ASA 81 mg Daily and Lipitor 80 mg 71 year old South African speaking Male, smoker (with 40 Pack year History with PMHx of ICM (EF 20-25%) 2/2 CAD s/p MIDCAB (LIMA-LAD) on 2/22, Grade III DD, prediabetes (A1c 5.9), Bladder and Gastric mass not yet investigated, who presented to OSH with AMS and aphasia, found to have MRI evidence of scattered bilateral acute/subacute infarctions, largest in the right basal ganglia and insular region, as well as in bilateral frontal lobes, left basal ganglia, and right parietal lobe. He underwent cerebral angiogram done as well at OSH which showed moderate stenosis of b/l ICA's and b/l mild stenosis at ICA origins, and moderate to severe stenosis of left subclavian artery.     Echo this hospitalization revealed Severely reduced LVSF with EF of 20% with global hypokinesis, LVH     Pertinent Cardiac Studies:  [place] [place]: Severe heavily calcified proximal LAD stenosis to distal L Main Artery.  TTE 02/09/22: EF 20-25%, Mild LVH, Grade III DD, Normal RV size and function, PASP: 42mmHG  TTE 02/25/22: EF 20%, Mild LVH, Normal RV size and Function. Unable to complete diastology assessment    1) Ischemic Cardiomyopathy(EF 20%) and dCHF  -Pt at ACC/AHA Stage C NYHA class II  -Patient's BP goal are pending repeat Head CT non con. Recommend starting Entresto 24-26 mg PO BID when out of Permissive HTN window  -In interim can switch Lopressor 12.5 mg PO BID to Toprol 25 mg Qd with first dose given in the PM.  -Please DC lasix as patient clinically euvolemic/dry  -Tele Reviewed with frequent PVCs and no definitive Afib. Would keep on tele to monitor for pAFib, as patient would then require AC    2) Atherosclerotic CVD  -Evidence of CAD (S/p LIMA-LAD) and Cerebrovascular disease (Multiple strokes)  -A1c of 5.4 and LDL of 90  -Cont with ASA 81 mg Daily and Lipitor 80 mg 71 year old [place]ots[place]an speaking Male, smoker (with 40 Pack year History with PMHx of ICM (EF 20-25%) 2/2 CAD s/p MIDCAB (LIMA-LAD) on 2/22, Grade III DD, prediabetes (A1c 5.9), Bladder and Gastric mass not yet investigated, who presented to OSH with AMS and aphasia, found to have MRI evidence of scattered bilateral acute/subacute infarctions, largest in the right basal ganglia and insular region, as well as in bilateral frontal lobes, left basal ganglia, and right parietal lobe. He underwent cerebral angiogram done as well at OSH which showed moderate stenosis of b/l ICA's and b/l mild stenosis at ICA origins, and moderate to severe stenosis of left subclavian artery.     Echo this hospitalization revealed Severely reduced LVSF with EF of 20% with global hypokinesis, LVH     Pertinent Cardiac Studies:  [place] [place]: Severe heavily calcified proximal LAD stenosis to distal L Main Artery.  TTE 02/09/22: EF 20-25%, Mild LVH, Grade III DD, Normal RV size and function, PASP: 42mmHG  TTE 02/25/22: EF 20%, Mild LVH, Normal RV size and Function. Unable to complete diastology assessment    1) Ischemic Cardiomyopathy(EF 20%) and dCHF  -Pt at ACC/AHA Stage C NYHA class II  -Patient's BP goal are pending repeat Head CT non con. Recommend starting Entresto 24-26 mg PO BID when out of Permissive HTN window  -In interim can switch Lopressor 12.5 mg PO BID to Toprol 25 mg Qd with first dose given in the PM.  -Please DC lasix as patient clinically euvolemic/dry  -Tele Reviewed with frequent PVCs and no definitive Afib. Would keep on tele to monitor for pAFib, as patient would then require AC    2) Atherosclerotic CVD  -Evidence of CAD (S/p LIMA-LAD) and Cerebrovascular disease (Multiple strokes)  -A1c of 5.4 and LDL of 90  -Cont with ASA 81 mg Daily and Lipitor 80 mg    3) CVA  -Given high risk of cardioembolic CVA with ischemic heart disease and recent cardiac surgery, would consider empiric anticoagulation though will defer to neurology team

## 2022-02-25 NOTE — PROGRESS NOTE ADULT - ASSESSMENT
71 year old Faroese speaking male, smoker (very recently quit, smoked x 40 years) with PMHx of CHF (EF 25-30%) and prediabetes (A1c 5.9) who is now s/p robotic MIDCAB with Dr. Santos on 02/09/2022, ambulating well and discharged on POD 4, returned to OSH on 02/20/2022, MRI showed bilateral embolic appearing strokes. Pt had ECHO, EEG and cerebral angio done at OSH as well. Patient was transferred to [facility] for further MGMT and work up, initially to CT surg but then tx to stroke service given CVA as primary reason for admission.     Neuro  #CVA workup  - continue aspirin 81mg daily  - continue atorvastatin 80mg daily  - q4hr stroke neuro checks and vitals  MCA CVA 2/20/22 at OSH  CTH left caudate/basal ganglia age indeterminate infarct   CTA head/neck showed focal right MCA M2 branch stenosis secondary to high grade near total occlusion versus focal occlusion, with occlusion of a right MCA anterior distal m2/M3 branch and posterior M2 branch narrowing with somewhat beaded appearance. Occlusion of left vertebral artery within V1 cannot be excluded. 50% left ICA stenosis. mild right ICA stenosis. 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    MRI Brain showing scattered bilateral acute/subacute infarctions, largest in the right basal ganglia and insular region, as well as in bilateral frontal lobes, left basal ganglia, and right parietal lobe.    EEG was negative for seizure. - Stroke education    Cards  #HTN  - permissive hypertension, Goal -180  - hold home blood pressure medication for now  - obtain TTE with bubble  - Stroke Code EKG Results:    #Heart failure  EF 20%  - Avoid fluid overload   - I/Os   - Cardiology consult     #HLD  - high dose statin as above in CVA  - LDL results:     Pulm  - call provider if SPO2 < 94%    GI  #Nutrition/Fluids/Electrolytes   - replete K<4 and Mg <2  - Diet:  - IVF:     Renal  -     Infectious Disease  - Stroke Code CXR results:     Endocrine  #DM  - A1C results:   - ISS    - TSH results:    DVT Prophylaxis  - lovenox sq for DVT prophylaxis   - SCDs for DVT prophylaxis       IDR Goals: Goals reviewed at interdisciplinary rounds with case management, social work, physical therapy, occupational therapy, and speech language pathology.   Please see specific therapy  notes for in depth goals.  Dispo: **********(Acute rehab - can tolerate 3 hours of therapy)     Discussed daily hospital plans and goals with patient and family at bedside. (Called and updated family.)    Discussed with Neurology Attending      71 year old [place] Islander speaking male, smoker (very recently quit, smoked x 40 years) with PMHx of CHF (EF 25-30%) and prediabetes (A1c 5.9) who is now s/p robotic MIDCAB with Dr. Santos on 02/09/2022, ambulating well and discharged on POD 4, returned to OSH on 02/20/2022, MRI showed bilateral embolic appearing strokes. Pt had ECHO, EEG and cerebral angio done at OSH as well. Patient was transferred to [facility] for further MGMT and work up, initially to CT surg but then tx to stroke service given CVA as primary reason for admission.            Neuro     #CVA workup     - continue aspirin 81mg daily     - continue atorvastatin 80mg daily     - q4hr stroke neuro checks and vitals     MCA CVA 2/20/22 at OSH     CTH left caudate/basal ganglia age indeterminate infarct     CTA head/neck showed focal right MCA M2 branch stenosis secondary to high grade near total occlusion versus focal occlusion, with occlusion of a right MCA anterior distal m2/M3 branch and posterior M2 branch narrowing with somewhat beaded appearance. Occlusion of left vertebral artery within V1 cannot be excluded. 50% left ICA stenosis. mild right ICA stenosis. 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      MRI Brain showing scattered bilateral acute/subacute infarctions, largest in the right basal ganglia and insular region, as well as in bilateral frontal lobes, left basal ganglia, and right parietal lobe.      EEG was negative for seizure. - Stroke education     - Repeat CTH      - TTE with bubble study     - JOEY on Monday      -  US            Cards     #HTN     - permissive hypertension, Goal -180     - hold home blood pressure medication for now     - obtain TTE with bubble     - Stroke Code EKG Results:           #Heart failure     EF 20%     - Avoid fluid overload      - I/Os      - Cardiology consult      - EKG            #HLD     - high dose statin as above in CVA     - LDL results: 90           Pulm     - call provider if SPO2 < 94%           GI     # Stomach mass     - EGD on Monday     - EUS on Monday            #Nutrition/Fluids/Electrolytes      - replete K<4 and Mg <2     - Diet: DASH      - IVF: None           Renal     - Daily BMP,      - UA     - Urine cytology            Infectious Disease     - Stroke Code CXR results:            Endocrine     #DM     - A1C results: pending            - TSH results: 7.64     - FT4, FT3            DVT Prophylaxis     - lovenox sq for DVT prophylaxis      - SCDs for DVT prophylaxis                  IDR Goals: Goals reviewed at interdisciplinary rounds with case management, social work, physical therapy, occupational therapy, and speech language pathology.      Please see specific therapy  notes for in depth goals.     Dispo: pending PT eval               Discussed with Neurology Attending

## 2022-02-25 NOTE — CONSULT NOTE ADULT - ASSESSMENT
71M Yakut speaker, HFrEF (25-30%), Pre-DM (5.9), s/p robotic MidCab 2/9/22 at [facility], recently dc who presented to OSH 2/20 w AMS found to have acute MCA CVA, b/l acute infarctions largest in R basal ganglia and insular region - transferred to [facility] for further management - found also to have hypernatremia, GI ulceration for EGD/EUS 2/28, and Bladder mass c/f malignancy    #Multiple strokes - suspect embolic. MCA CVA, B/L acute infarctions in R basal ganglia and insular region   - TSH elevated at last admission. FT4 added this admission - at target  #HFrEF - Appears euvolemic. EF 20% on TTE. Currently on Lasix 10 IV, Lopressor 12.5 BID  #CAD - no chest pain. On atorva , lopressor 12.5. CCTA 2/7 - severe calcified pLAD to distal L Main.   #Hypernatremia - Walsh in place and appears dry. Likely 2/2 decreased free water intake.   #GI ulceration vs antral mass - plan for EGD/EUS w GI on MON  #Bladder mass - Urology following and recommended MRI vs CT renal protocol. UA w cytology for possible malignancy. Recommended for outpatient cystoscopy   - Walsh in place    Recommendation  -Overall - pt appears dry. Agree w stopping lasix. Pt able to mirror actions and movements but possibly aphasia playing a larger role vs language barrier in communication challenges.  -TTE w Bubble study to assess PFO, BLE Dopplers  -f/u HF recs. Appears compensated at this time. Would check w pharmacy regarding cost/prior auth for Entresto  -Encourage PO/Fluid intake - pt requires assistance to do this  -f/u BMP in AM    DISPO: Pending PT/OT  Appreciate SW/CM assistance in identifying HCP -- pt's roommate was contacted during last admission regarding disposition and DC.

## 2022-02-25 NOTE — PROGRESS NOTE ADULT - ATTENDING COMMENTS
The patient is a 71-year-old Spanish speaking male with a history of recent tobacco dependence, CHF with reduced ejection fraction (20%), prediabetes, and CAD status post robotic MIDCAB 2//9/2022.  He was discharged neurologically intact.  He presented 2/20/2022 with altered mental status with work-up revealing multifocal, bilateral acute and subacute infarcts by report of unclear mechanism though occult a fib, paroxysmal emboli, and malignancy driving hypercoagulable state are all possibilities.    We will attempt to obtain outside images for review. Repeat head CT today. Repeat TTE (with bubble) and JOEY likely Monday with EGD/EUS for gastric mass per GI.  Will obtain urinary cytology per urology given concern for bladder cancer. DVT US given potential for PFO. For now, will continue aspirin alone given potential need for biopsy; will consider IV heparin pending review of images given concern for embolic process and/or hypercoagulable state related to malignancy. Continue statin.

## 2022-02-26 ENCOUNTER — TRANSCRIPTION ENCOUNTER (OUTPATIENT)
Age: 72
End: 2022-02-26

## 2022-02-26 LAB
ALBUMIN SERPL ELPH-MCNC: 3.4 G/DL — SIGNIFICANT CHANGE UP (ref 3.3–5)
ALP SERPL-CCNC: 66 U/L — SIGNIFICANT CHANGE UP (ref 40–120)
ALT FLD-CCNC: 11 U/L — SIGNIFICANT CHANGE UP (ref 10–45)
ANION GAP SERPL CALC-SCNC: 11 MMOL/L — SIGNIFICANT CHANGE UP (ref 5–17)
AST SERPL-CCNC: 21 U/L — SIGNIFICANT CHANGE UP (ref 10–40)
BILIRUB SERPL-MCNC: 0.5 MG/DL — SIGNIFICANT CHANGE UP (ref 0.2–1.2)
BUN SERPL-MCNC: 29 MG/DL — HIGH (ref 7–23)
CALCIUM SERPL-MCNC: 9.4 MG/DL — SIGNIFICANT CHANGE UP (ref 8.4–10.5)
CHLORIDE SERPL-SCNC: 117 MMOL/L — HIGH (ref 96–108)
CO2 SERPL-SCNC: 22 MMOL/L — SIGNIFICANT CHANGE UP (ref 22–31)
CREAT SERPL-MCNC: 0.84 MG/DL — SIGNIFICANT CHANGE UP (ref 0.5–1.3)
GLUCOSE SERPL-MCNC: 117 MG/DL — HIGH (ref 70–99)
HCT VFR BLD CALC: 40.1 % — SIGNIFICANT CHANGE UP (ref 39–50)
HGB BLD-MCNC: 13.1 G/DL — SIGNIFICANT CHANGE UP (ref 13–17)
MCHC RBC-ENTMCNC: 31.3 PG — SIGNIFICANT CHANGE UP (ref 27–34)
MCHC RBC-ENTMCNC: 32.7 GM/DL — SIGNIFICANT CHANGE UP (ref 32–36)
MCV RBC AUTO: 95.7 FL — SIGNIFICANT CHANGE UP (ref 80–100)
NRBC # BLD: 0 /100 WBCS — SIGNIFICANT CHANGE UP (ref 0–0)
PLATELET # BLD AUTO: 305 K/UL — SIGNIFICANT CHANGE UP (ref 150–400)
POTASSIUM SERPL-MCNC: 4 MMOL/L — SIGNIFICANT CHANGE UP (ref 3.5–5.3)
POTASSIUM SERPL-SCNC: 4 MMOL/L — SIGNIFICANT CHANGE UP (ref 3.5–5.3)
PROT SERPL-MCNC: 7 G/DL — SIGNIFICANT CHANGE UP (ref 6–8.3)
RBC # BLD: 4.19 M/UL — LOW (ref 4.2–5.8)
RBC # FLD: 14.6 % — HIGH (ref 10.3–14.5)
SODIUM SERPL-SCNC: 150 MMOL/L — HIGH (ref 135–145)
WBC # BLD: 5.23 K/UL — SIGNIFICANT CHANGE UP (ref 3.8–10.5)
WBC # FLD AUTO: 5.23 K/UL — SIGNIFICANT CHANGE UP (ref 3.8–10.5)

## 2022-02-26 PROCEDURE — 93970 EXTREMITY STUDY: CPT | Mod: 26

## 2022-02-26 PROCEDURE — 99232 SBSQ HOSP IP/OBS MODERATE 35: CPT

## 2022-02-26 PROCEDURE — 70450 CT HEAD/BRAIN W/O DYE: CPT | Mod: 26

## 2022-02-26 PROCEDURE — 99233 SBSQ HOSP IP/OBS HIGH 50: CPT

## 2022-02-26 RX ORDER — SODIUM CHLORIDE 9 MG/ML
1000 INJECTION, SOLUTION INTRAVENOUS
Refills: 0 | Status: DISCONTINUED | OUTPATIENT
Start: 2022-02-26 | End: 2022-02-26

## 2022-02-26 RX ORDER — SODIUM CHLORIDE 9 MG/ML
1000 INJECTION INTRAMUSCULAR; INTRAVENOUS; SUBCUTANEOUS
Refills: 0 | Status: DISCONTINUED | OUTPATIENT
Start: 2022-02-26 | End: 2022-02-27

## 2022-02-26 RX ADMIN — ATORVASTATIN CALCIUM 80 MILLIGRAM(S): 80 TABLET, FILM COATED ORAL at 21:39

## 2022-02-26 RX ADMIN — Medication 81 MILLIGRAM(S): at 12:45

## 2022-02-26 RX ADMIN — PANTOPRAZOLE SODIUM 40 MILLIGRAM(S): 20 TABLET, DELAYED RELEASE ORAL at 07:01

## 2022-02-26 RX ADMIN — Medication 25 MILLIGRAM(S): at 05:38

## 2022-02-26 RX ADMIN — SODIUM CHLORIDE 70 MILLILITER(S): 9 INJECTION, SOLUTION INTRAVENOUS at 12:45

## 2022-02-26 RX ADMIN — HEPARIN SODIUM 5000 UNIT(S): 5000 INJECTION INTRAVENOUS; SUBCUTANEOUS at 17:59

## 2022-02-26 RX ADMIN — SENNA PLUS 2 TABLET(S): 8.6 TABLET ORAL at 21:34

## 2022-02-26 RX ADMIN — HEPARIN SODIUM 5000 UNIT(S): 5000 INJECTION INTRAVENOUS; SUBCUTANEOUS at 05:38

## 2022-02-26 RX ADMIN — SODIUM CHLORIDE 70 MILLILITER(S): 9 INJECTION INTRAMUSCULAR; INTRAVENOUS; SUBCUTANEOUS at 16:10

## 2022-02-26 NOTE — PROGRESS NOTE ADULT - ASSESSMENT
71M Yi speaker, HFrEF (25-30%), Pre-DM (5.9), s/p robotic MidCab 2/9/22 at [facility], recently dc who presented to OSH 2/20 w AMS found to have acute MCA CVA, b/l acute infarctions largest in R basal ganglia and insular region - transferred to [facility] for further management - found also to have hypernatremia, GI ulceration for EGD/EUS 2/28, and Bladder mass c/f malignancy    #Multiple strokes - suspect embolic. MCA CVA, B/L acute infarctions in R basal ganglia and insular region   - TSH elevated at last admission. FT4 added this admission - at target  #HFrEF - Appears euvolemic. EF 20% on TTE. Currently on Lasix 10 IV, Lopressor 12.5 BID  #CAD - no chest pain. On atorva , lopressor 12.5. CCTA 2/7 - severe calcified pLAD to distal L Main.   #Hypernatremia - Walsh in place and appears dry. Likely 2/2 decreased free water intake.   #GI ulceration vs antral mass - plan for EGD/EUS w GI on MON  #Bladder mass - Urology following and recommended MRI vs CT renal protocol. UA w cytology for possible malignancy. Recommended for outpatient cystoscopy   - Walsh in place    Recommendation  -Overall - pt appears dry. Poor PO intake. Would consider some gentle D5 hydration today.  -Encourage PO/Fluid intake - pt requires assistance to do this  -continue to follow PO intake, SLP recs, OT/PT.     DISPO: Pending PT/OT  Appreciate SW/CM assistance in identifying HCP -- pt's roommate was contacted during last admission regarding disposition and DC.

## 2022-02-26 NOTE — PROGRESS NOTE ADULT - ATTENDING COMMENTS
The patient is a 71-year-old Kazakh speaking male with a history of recent tobacco dependence, CHF with reduced ejection fraction (20%), prediabetes, and CAD status post robotic MIDCAB 2//9/2022.  He was discharged neurologically intact.  He presented 2/20/2022 with altered mental status with work-up revealing multifocal, bilateral acute and subacute infarcts by report of unclear mechanism though occult a fib and malignancy driving hypercoagulable state are possibilities.    We will attempt to obtain outside images for review. Repeat HCT shows several R-sided subacute strokes with ?petechial hemorrhage (awaiting formal read).  Repeat TTE (with bubble) and JOEY likely Monday with EGD/EUS for gastric mass per GI.  Will obtain urinary cytology per urology given concern for bladder cancer.  For now, will continue aspirin alone given potential need for biopsy; will consider IV heparin pending review of images given concern for embolic process and/or hypercoagulable state related to malignancy. Continue statin.    The patient remains significantly hypernatremic with a significant FWD. Appreciate IM input. Will give gentle isotonic fluids given low EF and encourage PO intake as well.

## 2022-02-26 NOTE — PROGRESS NOTE ADULT - SUBJECTIVE AND OBJECTIVE BOX
OVERNIGHT EVENTS: No acute events reported overnight    SUBJECTIVE / INTERVAL HPI: Patient seen and examined at bedside. Resting comfortably and in no acute distress. Unable to participate in ROS due to current neurologic condition.    VITAL SIGNS:  Vital Signs Last 24 Hrs  T(C): 36.7 (2022 06:00), Max: 36.8 (2022 21:30)  T(F): 98 (2022 06:00), Max: 98.2 (2022 21:30)  HR: 110 (2022 05:38) (102 - 112)  BP: 125/87 (2022 05:38) (106/81 - 138/81)  BP(mean): 102 (2022 05:38) (90 - 108)  RR: 16 (2022 05:38) (16 - 20)  SpO2: 100% (2022 05:38) (100% - 100%)    PHYSICAL EXAM:  General: Frail  HEENT: PERRL, MMM  Neck: supple, no JVD, clavicular wasting  Cardiovascular: +S1/S2; RRR  Respiratory: CTA B/L; no W/R/R  Gastrointestinal: soft, NT/ND; +BSx4  Extremities: WWP; moving all extremities  Vascular: 2+ radial, DP/PT pulses B/L  Neurological: Nonverbal at time of exam. Moves extremities to commands in [place]er with 5/5 strength. Left sided facial droop. Unable to assess for pronator drift at time of exam.     MEDICATIONS:  MEDICATIONS  (STANDING):  aspirin  chewable 81 milliGRAM(s) Oral daily  atorvastatin 80 milliGRAM(s) Oral at bedtime  heparin   Injectable 5000 Unit(s) SubCutaneous every 12 hours  metoprolol succinate ER 25 milliGRAM(s) Oral daily  pantoprazole    Tablet 40 milliGRAM(s) Oral before breakfast  senna 2 Tablet(s) Oral at bedtime    MEDICATIONS  (PRN):  polyethylene glycol 3350 17 Gram(s) Oral daily PRN Constipation      ALLERGIES:  Allergies    No Known Allergies    Intolerances        LABS:                        13.1   5.23  )-----------( 305      ( 2022 06:20 )             40.1     02-26    150<H>  |  117<H>  |  29<H>  ----------------------------<  117<H>  4.0   |  22  |  0.84    Ca    9.4      2022 06:20  Phos  3.7       Mg     2.4         TPro  7.0  /  Alb  3.4  /  TBili  0.5  /  DBili  x   /  AST  21  /  ALT  11  /  AlkPhos  66        Urinalysis Basic - ( 2022 04:33 )    Color: Yellow / Appearance: Clear / S.025 / pH: x  Gluc: x / Ketone: NEGATIVE  / Bili: Negative / Urobili: 0.2 E.U./dL   Blood: x / Protein: Trace mg/dL / Nitrite: NEGATIVE   Leuk Esterase: NEGATIVE / RBC: Many /HPF / WBC < 5 /HPF   Sq Epi: x / Non Sq Epi: 0-5 /HPF / Bacteria: Present /HPF      CAPILLARY BLOOD GLUCOSE      POCT Blood Glucose.: 102 mg/dL (2022 15:33)      RADIOLOGY & ADDITIONAL TESTS: Reviewed.

## 2022-02-26 NOTE — PROGRESS NOTE ADULT - ASSESSMENT
71 year old noncompliant Guamanian speaking male, smoker (very recently quit, smoked x 40 years) with PMHx of CHF (EF 25-30%) and prediabetes (A1c 5.9) who is now s/p robotic MIDCAB with Dr. Santos on 02/09/2022.  Post OR he was extubated on POD 0, started on beta blockers by POD 2 and transferred to telemetry.  By POD 4 he was ambulating independently, on room air, having normal BM's, tolerating PO diet with surgical pain under control. He was discharged home on POD 4.  On 02/20/2022 patient presented to OSH with AMS. Work up included CT without contrast, CTA Head and Neck, and MRI Jerry showing acute MCA CVA with b/l acute inracts and the largest in the right basal ganglia and insular region. EEG negative for seizure. TTE = ? PFO. Patient transfer to [facility] for further management and workup. On 2/24 the stroke team was consulted for recent stroke findings. He failed a dysphagia screen, SLP came and approved soft, bite sized diet with mildly thick liquids. CT A/P completed on 2/8 showed bladder mass and ulceration to antrum of stomach (without drop in H/H) and GI and  were consulted. TTE completed w/definity and showed EF of 20%. Patient was transferred to neurology/stroke team service with CTSX following as a consult. Patient remains stable from a cardiac standpoint at this time.    Plan:  Problem 1: CAD s/p MIDCAB 2/9/22  -c/w ASA  -continue metoprolol  -continue atorvastatin  -continue lasix   -plan for JOEY on Monday  -Hemodynamically stable.   -Monitor: BP, HR, tele  -CTS will continue following    Problem 2: MCA CVA 2/20/22 at OSH  -multiple strokes, s/f embolic  -transferred to neuro/stroke team  -management per neuro team  -aphasic  -SLP eval for dysphagia- cleared for small bites, mildly thick liquids  -EEG negative for seizure    Problem 3: GI ulceration vs antral mass  -planned EGD/EUS with GI Monday  -appreciate GI recs  -care per primary team    Problem 4: bladder mass  -urology consulted, appreciate recs  -evans in place  -MRI vs CT renal protocol pending  -UA w/cytology for possible malignancy  -outpatient cystoscopy recommended    CTS will continue to follow along   71 year old noncompliant Vietnamese speaking male, smoker (very recently quit, smoked x 40 years) with PMHx of CHF (EF 25-30%) and prediabetes (A1c 5.9) who is now s/p robotic MIDCAB with Dr. Santos on 02/09/2022.  Post OR he was extubated on POD 0, started on beta blockers by POD 2 and transferred to telemetry.  By POD 4 he was ambulating independently, on room air, having normal BM's, tolerating PO diet with surgical pain under control. He was discharged home on POD 4.  On 02/20/2022 patient presented to OSH with AMS. Work up included CT without contrast, CTA Head and Neck, and MRI Jerry showing acute MCA CVA with b/l acute inracts and the largest in the right basal ganglia and insular region. EEG negative for seizure. TTE = ? PFO. Patient transfer to [facility] for further management and workup. On 2/24 the stroke team was consulted for recent stroke findings. He failed a dysphagia screen, SLP came and approved soft, bite sized diet with mildly thick liquids. CT A/P completed on 2/8 showed bladder mass and ulceration to antrum of stomach (without drop in H/H) and GI and  were consulted. TTE completed w/definity and showed EF of 20%. Patient was transferred to neurology/stroke team service with CTSX following as a consult. Patient remains stable from a cardiac standpoint at this time.    Plan:  Problem 1: CAD s/p MIDCAB 2/9/22  -continue ASA  -continue metoprolol  -continue atorvastatin  -consider restarting lasix for signs or symptoms of fluid overload   -plan for JOEY on Monday  -Hemodynamically stable.   -Monitor: BP, HR, tele  -CTS will continue following    Problem 2: MCA CVA 2/20/22 at OSH  -multiple strokes, s/f embolic  -transferred to neuro/stroke team  -aphasic  -SLP eval for dysphagia- cleared for small bites, mildly thick liquids  -EEG negative for seizure  -care per primary team    Problem 3: GI ulceration vs antral mass  -planned EGD/EUS with GI Monday  -appreciate GI recs  -care per primary team    Problem 4: bladder mass  -urology following  -evans in place  -hematuria 2/2 agitation and pulling on evans  -MRI vs CT renal protocol pending  -UA with cytology for possible malignancy  -outpatient cystoscopy recommended  -care per primary team    CTS will continue to follow along   71 year old noncompliant Nepalese speaking male, smoker (very recently quit, smoked x 40 years) with PMHx of CHF (EF 25-30%) and prediabetes (A1c 5.9) who is now s/p robotic MIDCAB with Dr. Santos on 02/09/2022.  Post OR he was extubated on POD 0, started on beta blockers by POD 2 and transferred to telemetry.  By POD 4 he was ambulating independently, on room air, having normal BM's, tolerating PO diet with surgical pain under control. He was discharged home on POD 4.  On 02/20/2022 patient presented to OSH with AMS. Work up included CT without contrast, CTA Head and Neck, and MRI Jerry showing acute MCA CVA with b/l acute inracts and the largest in the right basal ganglia and insular region. EEG negative for seizure. TTE = ? PFO. Patient transfer to [facility] for further management and workup. On 2/24 the stroke team was consulted for recent stroke findings. He failed a dysphagia screen, SLP came and approved soft, bite sized diet with mildly thick liquids. CT A/P completed on 2/8 showed bladder mass and ulceration to antrum of stomach (without drop in H/H) and GI and  were consulted. TTE completed w/definity and showed EF of 20%. Patient was transferred to neurology/stroke team service with CTSX following as a consult. Patient remains stable from a cardiac standpoint at this time.    Plan:  Problem 1: CAD s/p MIDCAB 2/9/22  -continue ASA  -continue metoprolol  -continue atorvastatin  -consider restarting lasix for signs or symptoms of fluid overload   -plan for JOEY on Monday  -Hemodynamically stable.   -Monitor: BP, HR, tele  -CTS will continue following    Problem 2: MCA CVA 2/20/22 at OSH  -multiple strokes, s/f embolic  -transferred to neuro/stroke team  -aphasic  -SLP eval for dysphagia- cleared for small bites, mildly thick liquids  -EEG negative for seizure  -care per primary team    Problem 3: GI ulceration vs antral mass  -planned EGD/EUS with GI Monday  -appreciate GI recs  -care per primary team    Problem 4: bladder mass  -urology following  -evans in place  -MRI vs CT renal protocol pending  -UA with cytology for possible malignancy  -outpatient cystoscopy recommended  -care per primary team    CTS will continue to follow along

## 2022-02-26 NOTE — PROGRESS NOTE ADULT - SUBJECTIVE AND OBJECTIVE BOX
Subj: patient seen bedside. No acute overnight events no complaints.     HPI "This is a 71 year old Sinhala speaking male, smoker (very recently quit, smoked x 40 years) with PMHx of CHF (EF 25-30%) and prediabetes (A1c 5.9) who is now s/p robotic MIDCAB with Dr. Santos on 02/09/2022.  Post OR he was extubated on POD 0, started on beta blockers by POD 2 and transferred to telemetry.  By POD 4 he was ambulating independently, on room air, having normal BM's, tolerating PO diet with surgical pain under control. He was discharged home on POD 4.  On 02/20/2022 patient presented to OSH with AMS. Work up included CT without, CTA Head and Neck and MRI Jerry showing acute MCA CVA, b/l acute inracts  with the largest in the right basal ganglia and insular region. EEG negative for seizure. TTE = ? PFO. Patient transfer to [facility] for further MGMT and work up.  "    71M Sinhala speaker, HFrEF (25-30%), Pre-DM (5.9), s/p robotic MidCab 2/9/22 at [facility], recently dc who presented to OSH 2/20 w AMS found to have acute MCA CVA, b/l acute infarctions largest in R basal ganglia and insular region - transferred to [facility] for further management - found also to have hypernatremia, GI ulceration for EGD/EUS 2/28, and Bladder mass c/f malignancy     used for visit  Pt able to state name as well as nod/shake head, move all extremities on command. However has decreased fluency and not able to state yes/no consistently or answer open-ended questions. He does deny any chest pain, abd pain, nausea. Unable to complete full ROS due to mentation.    --------------------------------------------------------------------------------------    VITAL SIGNS, INS/OUTS (last 24 hours):  --------------------------------------------------------------------------------------  Reviewed  acceptable    --------------------------------------------------------------------------------------    EXAM:  GEN: Thin Male in NAD on RA  HEENT: NC/AT, MMM, dry mucous membranes  CV: RRR, nml S1S2, no murmurs  PULM: nml effort, CTAB wo rales, rhonchi, or wheezing  ABD: Soft, non-distended, NABS, non-tender  PSYCH: Appropriate    LABS  --------------------------------------------------------------------------------------  hypernatremia

## 2022-02-26 NOTE — PROGRESS NOTE ADULT - ASSESSMENT
71M Mohawk speaking smoker (very recently quit, smoked x 40 years) with PMHx of CHF (EF 25-30%) and prediabetes (A1c 5.9) who is now s/p robotic MIDCAB with Dr. Santos on 02/09/2022, ambulating well and discharged on POD 4, returned to OSH on 02/20/2022 with AMS, MRI showed bilateral embolic appearing strokes. Pt had ECHO, EEG and cerebral angio done at OSH as well. Patient was transferred to [facility] for further MGMT and work up, initially to CT surg but then tx to stroke service given CVA as primary reason for admission.      Neuro     #CVA    - continue aspirin 81mg daily   - continue atorvastatin 80mg daily   - q4hr stroke neuro checks and vitals     MCA CVA 2/20/22 at OSH   CTH left caudate/basal ganglia age indeterminate infarct   CTA head/neck showed focal right MCA M2 branch stenosis secondary to high grade near total occlusion versus focal occlusion, with occlusion of a right MCA anterior distal m2/M3 branch and posterior M2 branch narrowing with somewhat beaded appearance. Occlusion of left vertebral artery within V1 cannot be excluded. 50% left ICA stenosis. mild right ICA stenosis. 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      MRI Brain showing scattered bilateral acute/subacute infarctions, largest in the right basal ganglia and insular region, as well as in bilateral frontal lobes, left basal ganglia, and right parietal lobe.      EEG was negative for seizure.     - Repeat CTH    - TTE with bubble study performed  - JOEY planned for Monday    - [facility]      Cardiology    #HTN   - permissive hypertension, Goal -180   - hold home blood pressure medication for now         #Heart failure   EF 20%   - Avoid fluid overload    - I/Os    - Cardiology consulted, appreciate recomendations    #HLD   - high dose statin as above in CVA   - LDL results: 90     Pulm   No active issues  - call provider if SPO2 < 94%     GI     # Stomach mass   - EGD and EUS on Monday      #Nutrition/Fluids/Electrolytes    - replete K<4 and Mg <2   - Diet: DASH    - IVF: None     Renal   - Daily BMP  - Urine cytology      Infectious Disease   No active issue      Endocrine   A1c 5.4  TSH 7.64       DVT Prophylaxis   - lovenox sq  - SCDs for DVT prophylaxis                  IDR Goals: Goals reviewed at interdisciplinary rounds with case management, social work, physical therapy, occupational therapy, and speech language pathology.      Please see specific therapy  notes for in depth goals.     Dispo: pending PT eval

## 2022-02-26 NOTE — PROGRESS NOTE ADULT - SUBJECTIVE AND OBJECTIVE BOX
Patient discussed on morning rounds with Dr. Santos    Operation / Date: 22 uncomplicated robotic assisted MIDCAB    SUBJECTIVE ASSESSMENT:  71y Male seen and examined. Patient aphasic and unable to answer questions. Recent history obtained from bedside staff. Tolerating PO diet, satting well on room air.    Vital Signs Last 24 Hrs  T(C): 36.6 (2022 14:53), Max: 36.8 (2022 21:30)  T(F): 97.8 (2022 14:53), Max: 98.2 (2022 21:30)  HR: 72 (2022 16:14) (72 - 112)  BP: 105/66 (2022 16:14) (105/66 - 130/85)  BP(mean): 81 (2022 16:14) (81 - 107)  RR: 18 (2022 16:14) (16 - 20)  SpO2: 100% (2022 16:14) (97% - 100%)  I&O's Detail    2022 07:01  -  2022 07:00  --------------------------------------------------------  IN:  Total IN: 0 mL    OUT:    Indwelling Catheter - Urethral (mL): 700 mL  Total OUT: 700 mL    Total NET: -700 mL      2022 07:01  -  2022 17:52  --------------------------------------------------------  IN:  Total IN: 0 mL    OUT:    Indwelling Catheter - Urethral (mL): 225 mL  Total OUT: 225 mL    Total NET: -225 mL      CHEST TUBE:  No  KERMIT DRAIN:  No.  EPICARDIAL WIRES: No.  TIE DOWNS: No.  AGUILAR: No.    PHYSICAL EXAM:    General: NAD, sitting comfortably in bed, aphasic  Neurological: alert and oriented, RUE and RLE strength 4/5, LUE and LLE strength 3/5, facial symmetry present  Cardiovascular: RRR, Clear S1 and S2, no murmurs appreciated  Respiratory: chest expansion symmetrical, CTA b/l, no wheezing noted  Gastrointestinal: +BS, soft, NT, ND  Extremities: moving spontaneously, no calf tenderness or edema.  Vascular: warm, well perfused. DP/PT pulses palpable b/l.  Incisions: L thoracotomy covered in dermabond, C/D/I      LABS:                        13.1   5.23  )-----------( 305      ( 2022 06:20 )             40.1       COUMADIN:  no            150<H>  |  117<H>  |  29<H>  ----------------------------<  117<H>  4.0   |  22  |  0.84    Ca    9.4      2022 06:20  Phos  3.7     02-25  Mg     2.4         TPro  7.0  /  Alb  3.4  /  TBili  0.5  /  DBili  x   /  AST  21  /  ALT  11  /  AlkPhos  66  -      Urinalysis Basic - ( 2022 04:33 )    Color: Yellow / Appearance: Clear / S.025 / pH: x  Gluc: x / Ketone: NEGATIVE  / Bili: Negative / Urobili: 0.2 E.U./dL   Blood: x / Protein: Trace mg/dL / Nitrite: NEGATIVE   Leuk Esterase: NEGATIVE / RBC: Many /HPF / WBC < 5 /HPF   Sq Epi: x / Non Sq Epi: 0-5 /HPF / Bacteria: Present /HPF        MEDICATIONS  (STANDING):  aspirin  chewable 81 milliGRAM(s) Oral daily  atorvastatin 80 milliGRAM(s) Oral at bedtime  heparin   Injectable 5000 Unit(s) SubCutaneous every 12 hours  metoprolol succinate ER 25 milliGRAM(s) Oral daily  pantoprazole    Tablet 40 milliGRAM(s) Oral before breakfast  senna 2 Tablet(s) Oral at bedtime  sodium chloride 0.9%. 1000 milliLiter(s) (70 mL/Hr) IV Continuous <Continuous>    MEDICATIONS  (PRN):  polyethylene glycol 3350 17 Gram(s) Oral daily PRN Constipation        RADIOLOGY & ADDITIONAL TESTS:    < from: Xray Chest 1 View- PORTABLE-Routine (Xray Chest 1 View- PORTABLE-Routine in AM.) (22 @ 04:59) >  FINDINGS:  Stable cardiomediastinal and hilar silhouettes. The lungs are clear.   Probable trace bilateral pleural effusions. No pneumothorax. No acute   abnormalities of the soft tissues and osseous structures.      IMPRESSION:  Probable trace bilateral pleural effusions.    < end of copied text >   Operation / Date: 22 uncomplicated robotic assisted MIDCAB    SUBJECTIVE ASSESSMENT:  71y Male seen and examined. Patient aphasic and unable to answer questions. Recent history obtained from bedside staff. Tolerating PO diet, satting well on room air.    Vital Signs Last 24 Hrs  T(C): 36.6 (2022 14:53), Max: 36.8 (2022 21:30)  T(F): 97.8 (2022 14:53), Max: 98.2 (2022 21:30)  HR: 72 (2022 16:14) (72 - 112)  BP: 105/66 (2022 16:14) (105/66 - 130/85)  BP(mean): 81 (2022 16:14) (81 - 107)  RR: 18 (2022 16:14) (16 - 20)  SpO2: 100% (2022 16:14) (97% - 100%)  I&O's Detail    2022 07:01  -  2022 07:00  --------------------------------------------------------  IN:  Total IN: 0 mL    OUT:    Indwelling Catheter - Urethral (mL): 700 mL  Total OUT: 700 mL    Total NET: -700 mL      2022 07:01  -  2022 17:52  --------------------------------------------------------  IN:  Total IN: 0 mL    OUT:    Indwelling Catheter - Urethral (mL): 225 mL  Total OUT: 225 mL    Total NET: -225 mL      CHEST TUBE:  No  KERMIT DRAIN:  No.  EPICARDIAL WIRES: No.  TIE DOWNS: No.  AGUILAR: Yes.    PHYSICAL EXAM:    General: NAD, sitting comfortably in bed, aphasic  Neurological: not alert or oriented, RUE and RLE strength 4/5, LUE and LLE strength 3/5, facial symmetry present  Cardiovascular: RRR, Clear S1 and S2, no murmurs appreciated  Respiratory: chest expansion symmetrical, CTA b/l, no wheezing noted  Gastrointestinal: +BS, soft, NT, ND  Extremities: moving spontaneously, no calf tenderness or edema.  Vascular: warm, well perfused. DP/PT pulses palpable b/l.  Incisions: L thoracotomy covered in dermabond, C/D/I      LABS:                        13.1   5.23  )-----------( 305      ( 2022 06:20 )             40.1       COUMADIN:  no            150<H>  |  117<H>  |  29<H>  ----------------------------<  117<H>  4.0   |  22  |  0.84    Ca    9.4      2022 06:20  Phos  3.7     02-25  Mg     2.4     -    TPro  7.0  /  Alb  3.4  /  TBili  0.5  /  DBili  x   /  AST  21  /  ALT  11  /  AlkPhos  66  -      Urinalysis Basic - ( 2022 04:33 )    Color: Yellow / Appearance: Clear / S.025 / pH: x  Gluc: x / Ketone: NEGATIVE  / Bili: Negative / Urobili: 0.2 E.U./dL   Blood: x / Protein: Trace mg/dL / Nitrite: NEGATIVE   Leuk Esterase: NEGATIVE / RBC: Many /HPF / WBC < 5 /HPF   Sq Epi: x / Non Sq Epi: 0-5 /HPF / Bacteria: Present /HPF        MEDICATIONS  (STANDING):  aspirin  chewable 81 milliGRAM(s) Oral daily  atorvastatin 80 milliGRAM(s) Oral at bedtime  heparin   Injectable 5000 Unit(s) SubCutaneous every 12 hours  metoprolol succinate ER 25 milliGRAM(s) Oral daily  pantoprazole    Tablet 40 milliGRAM(s) Oral before breakfast  senna 2 Tablet(s) Oral at bedtime  sodium chloride 0.9%. 1000 milliLiter(s) (70 mL/Hr) IV Continuous <Continuous>    MEDICATIONS  (PRN):  polyethylene glycol 3350 17 Gram(s) Oral daily PRN Constipation        RADIOLOGY & ADDITIONAL TESTS:    < from: Xray Chest 1 View- PORTABLE-Routine (Xray Chest 1 View- PORTABLE-Routine in AM.) (22 @ 04:59) >  FINDINGS:  Stable cardiomediastinal and hilar silhouettes. The lungs are clear.   Probable trace bilateral pleural effusions. No pneumothorax. No acute   abnormalities of the soft tissues and osseous structures.      IMPRESSION:  Probable trace bilateral pleural effusions.    < end of copied text >

## 2022-02-27 LAB
ALBUMIN SERPL ELPH-MCNC: 3.5 G/DL — SIGNIFICANT CHANGE UP (ref 3.3–5)
ALP SERPL-CCNC: 64 U/L — SIGNIFICANT CHANGE UP (ref 40–120)
ALT FLD-CCNC: 9 U/L — LOW (ref 10–45)
ANION GAP SERPL CALC-SCNC: 16 MMOL/L — SIGNIFICANT CHANGE UP (ref 5–17)
AST SERPL-CCNC: 33 U/L — SIGNIFICANT CHANGE UP (ref 10–40)
BASOPHILS # BLD AUTO: 0.08 K/UL — SIGNIFICANT CHANGE UP (ref 0–0.2)
BASOPHILS NFR BLD AUTO: 1.5 % — SIGNIFICANT CHANGE UP (ref 0–2)
BILIRUB SERPL-MCNC: 0.4 MG/DL — SIGNIFICANT CHANGE UP (ref 0.2–1.2)
BUN SERPL-MCNC: 31 MG/DL — HIGH (ref 7–23)
CALCIUM SERPL-MCNC: 9.4 MG/DL — SIGNIFICANT CHANGE UP (ref 8.4–10.5)
CHLORIDE SERPL-SCNC: 120 MMOL/L — HIGH (ref 96–108)
CO2 SERPL-SCNC: 17 MMOL/L — LOW (ref 22–31)
CREAT SERPL-MCNC: 0.9 MG/DL — SIGNIFICANT CHANGE UP (ref 0.5–1.3)
EOSINOPHIL # BLD AUTO: 0.32 K/UL — SIGNIFICANT CHANGE UP (ref 0–0.5)
EOSINOPHIL NFR BLD AUTO: 6 % — SIGNIFICANT CHANGE UP (ref 0–6)
GLUCOSE SERPL-MCNC: 111 MG/DL — HIGH (ref 70–99)
HCT VFR BLD CALC: 41.1 % — SIGNIFICANT CHANGE UP (ref 39–50)
HGB BLD-MCNC: 12.2 G/DL — LOW (ref 13–17)
IMM GRANULOCYTES NFR BLD AUTO: 0.6 % — SIGNIFICANT CHANGE UP (ref 0–1.5)
LYMPHOCYTES # BLD AUTO: 1.26 K/UL — SIGNIFICANT CHANGE UP (ref 1–3.3)
LYMPHOCYTES # BLD AUTO: 23.6 % — SIGNIFICANT CHANGE UP (ref 13–44)
MAGNESIUM SERPL-MCNC: 2.4 MG/DL — SIGNIFICANT CHANGE UP (ref 1.6–2.6)
MCHC RBC-ENTMCNC: 29.7 GM/DL — LOW (ref 32–36)
MCHC RBC-ENTMCNC: 30.2 PG — SIGNIFICANT CHANGE UP (ref 27–34)
MCV RBC AUTO: 101.7 FL — HIGH (ref 80–100)
MONOCYTES # BLD AUTO: 0.53 K/UL — SIGNIFICANT CHANGE UP (ref 0–0.9)
MONOCYTES NFR BLD AUTO: 9.9 % — SIGNIFICANT CHANGE UP (ref 2–14)
NEUTROPHILS # BLD AUTO: 3.12 K/UL — SIGNIFICANT CHANGE UP (ref 1.8–7.4)
NEUTROPHILS NFR BLD AUTO: 58.4 % — SIGNIFICANT CHANGE UP (ref 43–77)
NRBC # BLD: 0 /100 WBCS — SIGNIFICANT CHANGE UP (ref 0–0)
PHOSPHATE SERPL-MCNC: 3.6 MG/DL — SIGNIFICANT CHANGE UP (ref 2.5–4.5)
PLATELET # BLD AUTO: 258 K/UL — SIGNIFICANT CHANGE UP (ref 150–400)
POTASSIUM SERPL-MCNC: 4.3 MMOL/L — SIGNIFICANT CHANGE UP (ref 3.5–5.3)
POTASSIUM SERPL-SCNC: 4.3 MMOL/L — SIGNIFICANT CHANGE UP (ref 3.5–5.3)
PROT SERPL-MCNC: 6.8 G/DL — SIGNIFICANT CHANGE UP (ref 6–8.3)
RBC # BLD: 4.04 M/UL — LOW (ref 4.2–5.8)
RBC # FLD: 14.6 % — HIGH (ref 10.3–14.5)
SODIUM SERPL-SCNC: 153 MMOL/L — HIGH (ref 135–145)
WBC # BLD: 5.34 K/UL — SIGNIFICANT CHANGE UP (ref 3.8–10.5)
WBC # FLD AUTO: 5.34 K/UL — SIGNIFICANT CHANGE UP (ref 3.8–10.5)

## 2022-02-27 PROCEDURE — 99233 SBSQ HOSP IP/OBS HIGH 50: CPT

## 2022-02-27 PROCEDURE — 99232 SBSQ HOSP IP/OBS MODERATE 35: CPT

## 2022-02-27 RX ORDER — SODIUM CHLORIDE 9 MG/ML
1000 INJECTION INTRAMUSCULAR; INTRAVENOUS; SUBCUTANEOUS
Refills: 0 | Status: DISCONTINUED | OUTPATIENT
Start: 2022-02-27 | End: 2022-02-28

## 2022-02-27 RX ORDER — ACETAMINOPHEN 500 MG
600 TABLET ORAL ONCE
Refills: 0 | Status: COMPLETED | OUTPATIENT
Start: 2022-02-27 | End: 2022-02-27

## 2022-02-27 RX ORDER — HALOPERIDOL DECANOATE 100 MG/ML
1 INJECTION INTRAMUSCULAR ONCE
Refills: 0 | Status: COMPLETED | OUTPATIENT
Start: 2022-02-27 | End: 2022-02-27

## 2022-02-27 RX ADMIN — Medication 81 MILLIGRAM(S): at 12:52

## 2022-02-27 RX ADMIN — HEPARIN SODIUM 5000 UNIT(S): 5000 INJECTION INTRAVENOUS; SUBCUTANEOUS at 05:50

## 2022-02-27 RX ADMIN — HEPARIN SODIUM 5000 UNIT(S): 5000 INJECTION INTRAVENOUS; SUBCUTANEOUS at 18:16

## 2022-02-27 RX ADMIN — SENNA PLUS 2 TABLET(S): 8.6 TABLET ORAL at 22:13

## 2022-02-27 RX ADMIN — SODIUM CHLORIDE 50 MILLILITER(S): 9 INJECTION INTRAMUSCULAR; INTRAVENOUS; SUBCUTANEOUS at 18:16

## 2022-02-27 RX ADMIN — ATORVASTATIN CALCIUM 80 MILLIGRAM(S): 80 TABLET, FILM COATED ORAL at 22:13

## 2022-02-27 RX ADMIN — PANTOPRAZOLE SODIUM 40 MILLIGRAM(S): 20 TABLET, DELAYED RELEASE ORAL at 06:05

## 2022-02-27 RX ADMIN — Medication 240 MILLIGRAM(S): at 02:29

## 2022-02-27 RX ADMIN — Medication 25 MILLIGRAM(S): at 05:50

## 2022-02-27 RX ADMIN — HALOPERIDOL DECANOATE 1 MILLIGRAM(S): 100 INJECTION INTRAMUSCULAR at 01:10

## 2022-02-27 RX ADMIN — Medication 600 MILLIGRAM(S): at 03:00

## 2022-02-27 NOTE — PROGRESS NOTE ADULT - ASSESSMENT
71 year old noncompliant Mauritanian speaking male, smoker (very recently quit, smoked x 40 years) with PMHx of CHF (EF 25-30%) and prediabetes (A1c 5.9) who is now s/p robotic MIDCAB with Dr. Santos on 02/09/2022.  Post OR he was extubated on POD 0, started on beta blockers by POD 2 and transferred to telemetry.  By POD 4 he was ambulating independently, on room air, having normal BM's, tolerating PO diet with surgical pain under control. He was discharged home on POD 4.  On 02/20/2022 patient presented to OSH with AMS. Work up included CT without contrast, CTA Head and Neck, and MRI Jerry showing acute MCA CVA with b/l acute inracts and the largest in the right basal ganglia and insular region. EEG negative for seizure. TTE = ? PFO. Patient transfer to [facility] for further management and workup. On 2/24 the stroke team was consulted for recent stroke findings. He failed a dysphagia screen, SLP came and approved soft, bite sized diet with mildly thick liquids. CT A/P completed on 2/8 showed bladder mass and ulceration to antrum of stomach (without drop in H/H) and GI and  were consulted. TTE completed w/definity and showed EF of 20%. Patient was transferred to neurology/stroke team service with CTSX following as a consult. Patient remains stable from a cardiac standpoint at this time.    Plan:  Problem 1: CAD s/p MIDCAB 2/9/22  -continue ASA  -continue metoprolol  -continue atorvastatin  -consider restarting lasix for signs or symptoms of fluid overload   -plan for JOEY on Monday  -Hemodynamically stable.   -Monitor: BP, HR, tele  -CTS will continue following    Problem 2: MCA CVA 2/20/22 at OSH  -multiple strokes, s/f embolic  -transferred to neuro/stroke team  -aphasic  -SLP eval for dysphagia- cleared for small bites, mildly thick liquids  -EEG negative for seizure  -care per primary team    Problem 3: GI ulceration vs antral mass  -planned EGD/EUS with GI Monday  -appreciate GI recs  -care per primary team    Problem 4: bladder mass  -urology following  -evans in place  -hematuria 2/2 agitation and pulling on evans  -MRI vs CT renal protocol pending  -UA with cytology for possible malignancy  -outpatient cystoscopy recommended  -care per primary team    CTS will continue to follow along

## 2022-02-27 NOTE — CHART NOTE - NSCHARTNOTEFT_GEN_A_CORE
Spoke with patient's roommate Pascual Shavon. She did not want a Italian  in person, but states she will use one if called by telephone since it is harder to communicate that way.    Soon states that she has lived with the pt for about 5 years. They live fairly independent of each other but she wants what is best for him. She is comfortable making simple decisions for him, but would not want to make complicated decisions such as cancer treatment. She says she had not really heard what was going on since she brought him to [facility], was wondering if he had a stroke or anything. Given that she is the emergency contact, updated Soon on pt's stroke and concerning masses.     She is agreeable to palliative care consult. Would also consider ethics consult to help guide decision making and what she is comfortable with.

## 2022-02-27 NOTE — PROGRESS NOTE ADULT - ASSESSMENT
71M Arabic speaking smoker (very recently quit, smoked x 40 years) with PMHx of CHF (EF 25-30%) and prediabetes (A1c 5.9) who is now s/p robotic MIDCAB with Dr. Santos on 02/09/2022, ambulating well and discharged on POD 4, returned to OSH on 02/20/2022 with AMS, MRI showed bilateral embolic appearing strokes. Pt had ECHO, EEG and cerebral angio done at OSH as well. Patient was transferred to [facility] for further MGMT and work up, initially to CT surg but then tx to stroke service given CVA as primary reason for admission. He is pending TTE and JOEY. Course has been complicated by hypernatremia as well as known bladder and gastric masses, pending EGD +/- EUS.     Neuro     #CVA  - continue aspirin 81mg daily   - holding plavix while pending EGD with GI  - continue atorvastatin 80mg daily   - q4hr stroke neuro checks and vitals   - TTE with bubble pending  - JOEY pending  - b/l LE US 2/26 - negative for DVT    Cardiology    #HTN   - Goal SBP <180  - continue metoprolol succinate ER 25mg QD        #Heart failure   EF 20%   - Avoid fluid overload    - I/Os    - Cardiology consulted, appreciate recommendations    #HLD   - high dose statin as above in CVA   - LDL results: 90     Pulm   No active issues  - call provider if SPO2 < 94%     GI     # Stomach mass   - EGD +/- EUS  - most likely will not be done tomorrow (2/28)  - GI wants hypernatremia to improve prior to study     #Nutrition/Fluids/Electrolytes    - replete K<4 and Mg <2   - Diet: DASH/TLC - pending NGT placement  - IVF: None     Renal   - Daily BMP  - Urine cytology      Hematology  #hypernatremia ?chronic  - will place NGT today  - free water boluses  - daily BMP    Infectious Disease   No active issue      Endocrine   A1c 5.4  TSH 7.64     DVT Prophylaxis   - heparin x sq  - SCDs for DVT prophylaxis      Dispo: pending PT eval      Plan discussed with Neurology Attending Dr. Nazia Joiner 71M Faroese speaking smoker (very recently quit, smoked x 40 years) with PMHx of CHF (EF 25-30%) and prediabetes (A1c 5.9) who is now s/p robotic MIDCAB with Dr. Santos on 02/09/2022, ambulating well and discharged on POD 4, returned to OSH on 02/20/2022 with AMS, MRI showed bilateral embolic appearing strokes. Pt had ECHO, EEG and cerebral angio done at OSH as well. Patient was transferred to [facility] for further MGMT and work up, initially to CT surg but then tx to stroke service given CVA as primary reason for admission. He is pending TTE and JOEY. Course has been complicated by hypernatremia as well as known bladder and gastric masses, pending EGD +/- EUS.     Neuro     #CVA  - continue aspirin 81mg daily   - holding plavix while pending EGD with GI  - continue atorvastatin 80mg daily   - q4hr stroke neuro checks and vitals   - TTE with bubble pending  - JOEY pending  - b/l LE US 2/26 - negative for DVT    Cardiology    #HTN   - Goal SBP <180  - continue metoprolol succinate ER 25mg QD      #Heart failure   EF 20%   - Avoid fluid overload    - I/Os    - CT surgery consulted, appreciate recommendations    #HLD   - high dose statin as above in CVA   - LDL results: 90     Pulm   No active issues  - call provider if SPO2 < 94%     GI     # Stomach mass   - EGD +/- EUS  - most likely will not be done tomorrow (2/28)  - GI wants hypernatremia to improve prior to study   - GI following, appreciate recs    #Nutrition/Fluids/Electrolytes    - replete K<4 and Mg <2   - Diet: DASH/TLC - pending NGT placement  - IVF: None       Renal  - Daily BMP  - ordered left renal U/S - lesion seen on CT    #bladder mass  - urine cytology ordered  - maintain evans, will be discharged with as per   -  following, appreciate recs  Hematology  #hypernatremia ?chronic  - will place NGT today  - free water boluses  - daily BMP    Infectious Disease   No active issue      Endocrine   A1c 5.4  TSH 7.64     DVT Prophylaxis   - heparin x sq  - SCDs for DVT prophylaxis      Dispo: pending PT eval      Plan discussed with Neurology Attending Dr. Nazia Joiner

## 2022-02-27 NOTE — PROGRESS NOTE ADULT - SUBJECTIVE AND OBJECTIVE BOX
Operation / Date:  2/9/22 uncomplicated robotic assisted MIDCAB    SUBJECTIVE ASSESSMENT:  71y Male seen and examined. Patient aphasic and not able to answer questions, occasionally nodding. Overnight history obtained from bedside nurse, patient agitated and confused overnight, pulling at evans and lines. Poor PO intake, satting well on room air.    Vital Signs Last 24 Hrs  T(C): 36.4 (27 Feb 2022 06:23), Max: 36.6 (26 Feb 2022 14:53)  T(F): 97.5 (27 Feb 2022 06:23), Max: 97.8 (26 Feb 2022 14:53)  HR: 54 (27 Feb 2022 08:24) (54 - 108)  BP: 131/68 (27 Feb 2022 08:24) (105/66 - 149/88)  BP(mean): 94 (27 Feb 2022 08:24) (81 - 111)  RR: 18 (27 Feb 2022 08:24) (18 - 19)  SpO2: 100% (27 Feb 2022 08:24) (98% - 100%)  I&O's Detail    26 Feb 2022 07:01  -  27 Feb 2022 07:00  --------------------------------------------------------  IN:    dextrose 5%: 70 mL    sodium chloride 0.9%: 1050 mL  Total IN: 1120 mL    OUT:    Indwelling Catheter - Urethral (mL): 525 mL  Total OUT: 525 mL    Total NET: 595 mL      CHEST TUBE:  No  KERMIT DRAIN:  No.  EPICARDIAL WIRES: No.  TIE DOWNS: No.  EVANS: Yes.    PHYSICAL EXAM:    General: NAD, sitting comfortably in bed, aphasic  Neurological: alert not oriented, RUE and RLE strength 4/5, LUE and LLE strength 3/5, facial symmetry present  Cardiovascular: RRR, Clear S1 and S2, no murmurs appreciated  Respiratory: chest expansion symmetrical, CTA b/l, no wheezing noted  Gastrointestinal: +BS, soft, NT, ND  Extremities: moving spontaneously, no calf tenderness or edema.  Vascular: warm, well perfused. DP/PT pulses palpable b/l.  Incisions: L thoracotomy covered with dermabond, C/D/I, no drainage or purulence      LABS:                        12.2   5.34  )-----------( 258      ( 27 Feb 2022 06:11 )             41.1       COUMADIN:  No      02-27    153<H>  |  120<H>  |  31<H>  ----------------------------<  111<H>  4.3   |  17<L>  |  0.90    Ca    9.4      27 Feb 2022 06:11  Phos  3.6     02-27  Mg     2.4     02-27    TPro  6.8  /  Alb  3.5  /  TBili  0.4  /  DBili  x   /  AST  33  /  ALT  9<L>  /  AlkPhos  64  02-27      MEDICATIONS  (STANDING):  aspirin  chewable 81 milliGRAM(s) Oral daily  atorvastatin 80 milliGRAM(s) Oral at bedtime  heparin   Injectable 5000 Unit(s) SubCutaneous every 12 hours  metoprolol succinate ER 25 milliGRAM(s) Oral daily  pantoprazole    Tablet 40 milliGRAM(s) Oral before breakfast  senna 2 Tablet(s) Oral at bedtime  sodium chloride 0.9%. 1000 milliLiter(s) (70 mL/Hr) IV Continuous <Continuous>    MEDICATIONS  (PRN):  polyethylene glycol 3350 17 Gram(s) Oral daily PRN Constipation        RADIOLOGY & ADDITIONAL TESTS:

## 2022-02-27 NOTE — PROGRESS NOTE ADULT - SUBJECTIVE AND OBJECTIVE BOX
Subj: patient seen bedside. No acute overnight events no complaints.     HPI "This is a 71 year old Maori speaking male, smoker (very recently quit, smoked x 40 years) with PMHx of CHF (EF 25-30%) and prediabetes (A1c 5.9) who is now s/p robotic MIDCAB with Dr. Santos on 02/09/2022.  Post OR he was extubated on POD 0, started on beta blockers by POD 2 and transferred to telemetry.  By POD 4 he was ambulating independently, on room air, having normal BM's, tolerating PO diet with surgical pain under control. He was discharged home on POD 4.  On 02/20/2022 patient presented to OSH with AMS. Work up included CT without, CTA Head and Neck and MRI Jerry showing acute MCA CVA, b/l acute inracts  with the largest in the right basal ganglia and insular region. EEG negative for seizure. TTE = ? PFO. Patient transfer to [facility] for further MGMT and work up.  "    71M Maori speaker, HFrEF (25-30%), Pre-DM (5.9), s/p robotic MidCab 2/9/22 at [facility], recently dc who presented to OSH 2/20 w AMS found to have acute MCA CVA, b/l acute infarctions largest in R basal ganglia and insular region - transferred to [facility] for further management - found also to have hypernatremia, GI ulceration for EGD/EUS 2/28, and Bladder mass c/f malignancy     used for visit  Pt able to state name as well as nod/shake head, move all extremities on command. However has decreased fluency and not able to state yes/no consistently or answer open-ended questions. He does deny any chest pain, abd pain, nausea. Unable to complete full ROS due to mentation.    --------------------------------------------------------------------------------------    VITAL SIGNS, INS/OUTS (last 24 hours):  --------------------------------------------------------------------------------------  Reviewed  acceptable    --------------------------------------------------------------------------------------    EXAM:  GEN: Thin Male in NAD on RA  HEENT: NC/AT, MMM, dry mucous membranes  CV: RRR, nml S1S2, no murmurs  PULM: nml effort, CTAB wo rales, rhonchi, or wheezing  ABD: Soft, non-distended, NABS, non-tender  PSYCH: Appropriate    LABS  --------------------------------------------------------------------------------------  hypernatremia still present

## 2022-02-27 NOTE — PROGRESS NOTE ADULT - ASSESSMENT
71M Telugu speaker, HFrEF (25-30%), Pre-DM (5.9), s/p robotic MidCab 2/9/22 at [facility], recently dc who presented to OSH 2/20 w AMS found to have acute MCA CVA, b/l acute infarctions largest in R basal ganglia and insular region - transferred to [facility] for further management - found also to have hypernatremia, GI ulceration for EGD/EUS 2/28, and Bladder mass c/f malignancy    #Multiple strokes - suspect embolic. MCA CVA, B/L acute infarctions in R basal ganglia and insular region   - TSH elevated at last admission. FT4 added this admission - at target  #HFrEF - Appears euvolemic. EF 20% on TTE. Currently on Lasix 10 IV, Lopressor 12.5 BID  #CAD - no chest pain. On atorva , lopressor 12.5. CCTA 2/7 - severe calcified pLAD to distal L Main.   #Hypernatremia - Walsh in place and appears dry. Likely 2/2 decreased free water intake.   #GI ulceration vs antral mass - plan for EGD/EUS w GI on MON  #Bladder mass - Urology following and recommended MRI vs CT renal protocol. UA w cytology for possible malignancy. Recommended for outpatient cystoscopy   - Walsh in place    Recommendation  -Overall - pt appears dry. Poor PO intake->on NS for hydration. Needs free water, encourage PO but would also recommend D5W. Free water deficit 1.9L.  -Encourage PO/Fluid intake - pt requires assistance to do this  -continue to follow PO intake, SLP recs, OT/PT.     DISPO: Pending PT/OT  Appreciate SW/CM assistance in identifying HCP

## 2022-02-27 NOTE — PROGRESS NOTE ADULT - SUBJECTIVE AND OBJECTIVE BOX
Neurology Stroke Progress Note    INTERVAL HPI/OVERNIGHT EVENTS:  Patient seen and examined.  number [id number]. Patient asleep in bed, minimally participatory with examination. Denies pain at this time. Overnight, patient became agitated, pulling at evans and required dose of IV haldol and tylenol 2/2 discomfort.     MEDICATIONS  (STANDING):  aspirin  chewable 81 milliGRAM(s) Oral daily  atorvastatin 80 milliGRAM(s) Oral at bedtime  heparin   Injectable 5000 Unit(s) SubCutaneous every 12 hours  metoprolol succinate ER 25 milliGRAM(s) Oral daily  pantoprazole    Tablet 40 milliGRAM(s) Oral before breakfast  senna 2 Tablet(s) Oral at bedtime  sodium chloride 0.9%. 1000 milliLiter(s) (70 mL/Hr) IV Continuous <Continuous>    MEDICATIONS  (PRN):  polyethylene glycol 3350 17 Gram(s) Oral daily PRN Constipation      Allergies    No Known Allergies    Intolerances      Vital Signs Last 24 Hrs  T(C): 36.7 (27 Feb 2022 14:00), Max: 36.7 (27 Feb 2022 14:00)  T(F): 98.1 (27 Feb 2022 14:00), Max: 98.1 (27 Feb 2022 14:00)  HR: 54 (27 Feb 2022 08:24) (54 - 108)  BP: 131/68 (27 Feb 2022 08:24) (105/66 - 149/88)  BP(mean): 94 (27 Feb 2022 08:24) (81 - 111)  RR: 18 (27 Feb 2022 08:24) (18 - 19)  SpO2: 100% (27 Feb 2022 08:24) (98% - 100%)    Physical exam:  General: Thin, elderly man laying in bed. Appears uncomfortable.  Eyes: Anicteric sclerae, moist conjunctivae, see below for CNs  Neck: trachea midline  Cardiovascular: Regular rate and rhythm, no murmurs, rubs, or gallops. No carotid bruits.   Pulmonary: Anterior breath sounds clear bilaterally, no crackles or wheezing. No use of accessory muscles  GI: Abdomen soft, non-distended  Extremities: no edema    Neurologic:  -Mental status: Lethargic x1, does not respond when asked the date and place. Intermittently follows commands but requires frequent prompting.   -Cranial nerves:   II: Unable to assess 2/2 limited participation by patient  III, IV, VI: Unable to assess EOMs 2/2 limited participation by patient Pupils equally round and reactive to light  VII: Left facial droop  XII: Unable to assess 2/2 limited participation by patient  Motor: Decreased bulk and tone. Unable to assess drift. Strength bilateral upper extremity 3/5, bilateral lower extremities 2+/5.  Sensation: Grossly intact.  Coordination: Unable to assess 2/2 limited participation by patient    LABS:                        12.2   5.34  )-----------( 258      ( 27 Feb 2022 06:11 )             41.1     02-27    153<H>  |  120<H>  |  31<H>  ----------------------------<  111<H>  4.3   |  17<L>  |  0.90    Ca    9.4      27 Feb 2022 06:11  Phos  3.6     02-27  Mg     2.4     02-27    TPro  6.8  /  Alb  3.5  /  TBili  0.4  /  DBili  x   /  AST  33  /  ALT  9<L>  /  AlkPhos  64  02-27          RADIOLOGY & ADDITIONAL TESTS:      MCA CVA 2/20/22 at OSH   CTH left caudate/basal ganglia age indeterminate infarct   CTA head/neck showed focal right MCA M2 branch stenosis secondary to high grade near total occlusion versus focal occlusion, with occlusion of a right MCA anterior distal m2/M3 branch and posterior M2 branch narrowing with somewhat beaded appearance. Occlusion of left vertebral artery within V1 cannot be excluded. 50% left ICA stenosis. mild right ICA stenosis. 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      MRI Brain showing scattered bilateral acute/subacute infarctions, largest in the right basal ganglia and insular region, as well as in bilateral frontal lobes, left basal ganglia, and right parietal lobe.      EEG was negative for seizure.     NCHCT 2/26 -     Age-indeterminate infarcts in the right frontal operculum, right corona   radiata, right basal ganglia, and right insular cortex..

## 2022-02-27 NOTE — PROGRESS NOTE ADULT - ATTENDING COMMENTS
The patient is a 71-year-old Estonian speaking male with a history of recent tobacco dependence, CHF with reduced ejection fraction (20%), prediabetes, and CAD status post robotic MIDCAB 2//9/2022.  He was discharged neurologically intact.  He presented 2/20/2022 with altered mental status with work-up revealing multifocal, bilateral acute and subacute infarcts by report of unclear mechanism though occult a fib and malignancy driving hypercoagulable state are possibilities.    We will attempt to obtain outside images for review. Repeat HCT shows several R-sided subcortical subacute strokes. Plan to repeat TTE (with bubble) and JOEY likely Monday with EGD/EUS for gastric mass per GI.  Will obtain urinary cytology per urology given concern for bladder cancer.  For now, will continue aspirin alone given potential need for biopsy; will consider IV heparin pending review of images given concern for embolic process and/or hypercoagulable state related to malignancy. Continue statin.    The patient remains significantly hypernatremic with a significant FWD. Appreciate IM input. Will give gentle isotonic fluids and place NGT for FWF given poor oral intake.

## 2022-02-28 LAB
ANION GAP SERPL CALC-SCNC: 8 MMOL/L — SIGNIFICANT CHANGE UP (ref 5–17)
BUN SERPL-MCNC: 29 MG/DL — HIGH (ref 7–23)
CALCIUM SERPL-MCNC: 9 MG/DL — SIGNIFICANT CHANGE UP (ref 8.4–10.5)
CHLORIDE SERPL-SCNC: 121 MMOL/L — HIGH (ref 96–108)
CO2 SERPL-SCNC: 26 MMOL/L — SIGNIFICANT CHANGE UP (ref 22–31)
CREAT SERPL-MCNC: 0.9 MG/DL — SIGNIFICANT CHANGE UP (ref 0.5–1.3)
GLUCOSE SERPL-MCNC: 105 MG/DL — HIGH (ref 70–99)
HCT VFR BLD CALC: 36.9 % — LOW (ref 39–50)
HGB BLD-MCNC: 11.8 G/DL — LOW (ref 13–17)
MAGNESIUM SERPL-MCNC: 2.4 MG/DL — SIGNIFICANT CHANGE UP (ref 1.6–2.6)
MCHC RBC-ENTMCNC: 31.6 PG — SIGNIFICANT CHANGE UP (ref 27–34)
MCHC RBC-ENTMCNC: 32 GM/DL — SIGNIFICANT CHANGE UP (ref 32–36)
MCV RBC AUTO: 98.7 FL — SIGNIFICANT CHANGE UP (ref 80–100)
NRBC # BLD: 0 /100 WBCS — SIGNIFICANT CHANGE UP (ref 0–0)
PHOSPHATE SERPL-MCNC: 2.9 MG/DL — SIGNIFICANT CHANGE UP (ref 2.5–4.5)
PLATELET # BLD AUTO: 266 K/UL — SIGNIFICANT CHANGE UP (ref 150–400)
POTASSIUM SERPL-MCNC: 4 MMOL/L — SIGNIFICANT CHANGE UP (ref 3.5–5.3)
POTASSIUM SERPL-SCNC: 4 MMOL/L — SIGNIFICANT CHANGE UP (ref 3.5–5.3)
RBC # BLD: 3.74 M/UL — LOW (ref 4.2–5.8)
RBC # FLD: 14.7 % — HIGH (ref 10.3–14.5)
SODIUM SERPL-SCNC: 155 MMOL/L — HIGH (ref 135–145)
WBC # BLD: 5.01 K/UL — SIGNIFICANT CHANGE UP (ref 3.8–10.5)
WBC # FLD AUTO: 5.01 K/UL — SIGNIFICANT CHANGE UP (ref 3.8–10.5)

## 2022-02-28 PROCEDURE — 99233 SBSQ HOSP IP/OBS HIGH 50: CPT

## 2022-02-28 PROCEDURE — 71045 X-RAY EXAM CHEST 1 VIEW: CPT | Mod: 26,76

## 2022-02-28 PROCEDURE — 99223 1ST HOSP IP/OBS HIGH 75: CPT

## 2022-02-28 PROCEDURE — 99232 SBSQ HOSP IP/OBS MODERATE 35: CPT

## 2022-02-28 PROCEDURE — 99358 PROLONG SERVICE W/O CONTACT: CPT | Mod: NC

## 2022-02-28 PROCEDURE — 76770 US EXAM ABDO BACK WALL COMP: CPT | Mod: 26

## 2022-02-28 RX ADMIN — PANTOPRAZOLE SODIUM 40 MILLIGRAM(S): 20 TABLET, DELAYED RELEASE ORAL at 06:28

## 2022-02-28 RX ADMIN — SENNA PLUS 2 TABLET(S): 8.6 TABLET ORAL at 22:00

## 2022-02-28 RX ADMIN — ATORVASTATIN CALCIUM 80 MILLIGRAM(S): 80 TABLET, FILM COATED ORAL at 22:00

## 2022-02-28 RX ADMIN — HEPARIN SODIUM 5000 UNIT(S): 5000 INJECTION INTRAVENOUS; SUBCUTANEOUS at 06:28

## 2022-02-28 RX ADMIN — HEPARIN SODIUM 5000 UNIT(S): 5000 INJECTION INTRAVENOUS; SUBCUTANEOUS at 17:49

## 2022-02-28 RX ADMIN — Medication 25 MILLIGRAM(S): at 06:29

## 2022-02-28 RX ADMIN — Medication 81 MILLIGRAM(S): at 13:09

## 2022-02-28 NOTE — PHYSICAL THERAPY INITIAL EVALUATION ADULT - GENERAL OBSERVATIONS, REHAB EVAL
PT IE Completed. MRS:4 Pt received semi-supine in bed, Divehi speaking (not very responsive to intrepreter), EKG, NG tube, b/l mitten restraints, +evans, +RA,+heplock, in NAD and agreeable to work with PT after aroused with vigorous repeated stimulation, MARIO ALBERTO Negrete notified.Pt left in as found, +bed alarm, +call george within reach, MARIO ALBERTO Seo notified. Pt can benefit from PT in order to improve quality of life by increasing strength/endurance/balance with functional mobility and ADLS.

## 2022-02-28 NOTE — PHYSICAL THERAPY INITIAL EVALUATION ADULT - ADDITIONAL COMMENTS
Pt is a poor historian 2/2 cognition. Per SW, pt lives in an apartment with multiple roommate. Pt was independent  with ADLs and provided assist as needed. Per chart, pt was ambulating independently early February 2022 and d/c home. recently discharged with [facility] but no other home care services prior.

## 2022-02-28 NOTE — PROGRESS NOTE ADULT - ASSESSMENT
71y Vietnamese-speaking M, smoker, Hx of CHF, PreDM, s/p robotic MIDCAB in early February, presenting now after found down with new infarcts. GI consulted for abnormal imaging.    Abnormal imaging - Patient with CT A/P on 2/8 during prior hospitalization demonstrating possible antral thickening/gastric mass. Patient now returning with CVA. GI consulted for imaging findings as patient easily lost to follow-up. Patient not speaking during examination despite use of  (Link Trigger [id number]), limiting history of any previous endoscopic evaluations.  - Patient would benefit from EGD +/- EUS of CT findings  - Appreciate Palliative and Ethics recommendations regarding assignment of HCA for consent purposes - patient reportedly with no family, only friend/roommate  - Will need Neurologic evaluation/optimization and recommendations prior to any endoscopic procedure  - Needs optimization of electrolytes (Na of 155) prior to endoscopic evaluation    Recommendations discussed with primary team  Case discussed with attending physician

## 2022-02-28 NOTE — PHYSICAL THERAPY INITIAL EVALUATION ADULT - PERTINENT HX OF CURRENT PROBLEM, REHAB EVAL
70yo M Prydeinig speaking, smoker (very recently quit, smoked x 40 years) with PMHx of CHF (EF 25-30%) and prediabetes (A1c 5.9) who is now s/p robotic MIDCAB with Dr. Santos on 02/09/2022. Pt was d/c from [facility] to home ambulating independently. On 02/20/2022 pt presented to OSH with AMS. Found to have acute MCA CVA, b/l acute inracts with the largest in the right basal ganglia and insular region.

## 2022-02-28 NOTE — CONSULT NOTE ADULT - PROBLEM SELECTOR RECOMMENDATION 9
- Patient with evidence of several right sided frontal and basal ganglia embolic strokes present on imaging.   - Waxing and waning mental status.   - At this point, patient is unable to make decisions regarding medical care.   - Active workup is underway.  - At this point, given the lack of immediate family to assist with medical decisions, it is appropriate to have patients friend (Soon Park) participate in medical decision making, as much as she is comfortable).

## 2022-02-28 NOTE — CONSULT NOTE ADULT - PROBLEM SELECTOR RECOMMENDATION 4
- Patient is evidence of heart failure, s/p recent MIDCAB procedure.  - Now with embolic CVA's.  - After discussion with patients surrogate, Soon Park (no active immediate family available), she is ok with making some medical decisions, such as consenting for certain procedures. She feels like she would not be comfortable in making advanced care planning decisions such as code status or goals of care decisions, and would defer that to the physicians.  - Palliative care to follow. If medical issues change or deterioration occurs, further GOC discussions will need to occur.

## 2022-02-28 NOTE — PROGRESS NOTE ADULT - SUBJECTIVE AND OBJECTIVE BOX
Neurology Stroke Progress Note    INTERVAL HPI/OVERNIGHT EVENTS:  Patient seen and examined. Patient     MEDICATIONS  (STANDING):  aspirin  chewable 81 milliGRAM(s) Oral daily  atorvastatin 80 milliGRAM(s) Oral at bedtime  heparin   Injectable 5000 Unit(s) SubCutaneous every 12 hours  metoprolol succinate ER 25 milliGRAM(s) Oral daily  pantoprazole    Tablet 40 milliGRAM(s) Oral before breakfast  senna 2 Tablet(s) Oral at bedtime  sodium chloride 0.9%. 1000 milliLiter(s) (50 mL/Hr) IV Continuous <Continuous>    MEDICATIONS  (PRN):  polyethylene glycol 3350 17 Gram(s) Oral daily PRN Constipation    Allergies    No Known Allergies    Intolerances        Vital Signs Last 24 Hrs  T(C): 36.8 (28 Feb 2022 06:30), Max: 37.1 (27 Feb 2022 21:36)  T(F): 98.2 (28 Feb 2022 06:30), Max: 98.8 (28 Feb 2022 01:00)  HR: 108 (28 Feb 2022 04:22) (98 - 116)  BP: 147/91 (28 Feb 2022 04:22) (126/78 - 147/91)  BP(mean): 113 (28 Feb 2022 04:22) (96 - 113)  RR: 18 (28 Feb 2022 04:22) (18 - 20)  SpO2: 99% (28 Feb 2022 04:22) (97% - 100%)    Physical exam:  General: No acute distress, awake and alert  Eyes: Anicteric sclerae, moist conjunctivae, see below for CNs  Neck: trachea midline, FROM, supple, no thyromegaly or lymphadenopathy  Cardiovascular: Regular rate and rhythm, no murmurs, rubs, or gallops. No carotid bruits.   Pulmonary: Anterior breath sounds clear bilaterally, no crackles or wheezing. No use of accessory muscles  GI: Abdomen soft, non-distended, non-tender  Extremities: Radial and DP pulses +2, no edema    Neurologic:  -Mental status: Awake, alert, oriented to person, place, and time. Speech is fluent with intact naming, repetition, and comprehension, no dysarthria. Recent and remote memory intact. Follows commands. Attention/concentration intact. Fund of knowledge appropriate.  -Cranial nerves:   II: Visual fields are full to confrontation.  III, IV, VI: Extraocular movements are intact without nystagmus. Pupils equally round and reactive to light  V:  Facial sensation V1-V3 equal and intact   VII: Face is symmetric with normal eye closure and smile  VIII: Hearing is bilaterally intact to finger rub  IX, X: Uvula is midline and soft palate rises symmetrically  XI: Head turning and shoulder shrug are intact.  XII: Tongue protrudes midline  Motor: Normal bulk and tone. No pronator drift. Strength bilateral upper extremity 5/5, bilateral lower extremities 5/5.  Rapid alternating movements intact and symmetric  Sensation: Intact to light touch bilaterally. No neglect or extinction on double simultaneous testing.  Coordination: No dysmetria on finger-to-nose and heel-to-shin bilaterally  Reflexes: Downgoing toes bilaterally   Gait: Narrow gait and steady    LABS:                        11.8   5.01  )-----------( 266      ( 28 Feb 2022 05:58 )             36.9     02-28    155<H>  |  121<H>  |  29<H>  ----------------------------<  105<H>  4.0   |  26  |  0.90    Ca    9.0      28 Feb 2022 05:58  Phos  2.9     02-28  Mg     2.4     02-28    TPro  6.8  /  Alb  3.5  /  TBili  0.4  /  DBili  x   /  AST  33  /  ALT  9<L>  /  AlkPhos  64  02-27          RADIOLOGY & ADDITIONAL TESTS:     Neurology Stroke Progress Note    INTERVAL HPI/OVERNIGHT EVENTS:  Patient seen and examined. Patient     MEDICATIONS  (STANDING):  aspirin  chewable 81 milliGRAM(s) Oral daily  atorvastatin 80 milliGRAM(s) Oral at bedtime  heparin   Injectable 5000 Unit(s) SubCutaneous every 12 hours  metoprolol succinate ER 25 milliGRAM(s) Oral daily  pantoprazole    Tablet 40 milliGRAM(s) Oral before breakfast  senna 2 Tablet(s) Oral at bedtime  sodium chloride 0.9%. 1000 milliLiter(s) (50 mL/Hr) IV Continuous <Continuous>    MEDICATIONS  (PRN):  polyethylene glycol 3350 17 Gram(s) Oral daily PRN Constipation    Allergies    No Known Allergies    Intolerances        Vital Signs Last 24 Hrs  T(C): 36.8 (28 Feb 2022 06:30), Max: 37.1 (27 Feb 2022 21:36)  T(F): 98.2 (28 Feb 2022 06:30), Max: 98.8 (28 Feb 2022 01:00)  HR: 108 (28 Feb 2022 04:22) (98 - 116)  BP: 147/91 (28 Feb 2022 04:22) (126/78 - 147/91)  BP(mean): 113 (28 Feb 2022 04:22) (96 - 113)  RR: 18 (28 Feb 2022 04:22) (18 - 20)  SpO2: 99% (28 Feb 2022 04:22) (97% - 100%)    Physical exam:  General: awake and alert  Eyes: Anicteric sclerae, moist conjunctivae, see below for CNs  Neck: trachea midline  Cardiovascular: Regular rate and rhythm, no murmurs, rubs, or gallops. No carotid bruits.   Pulmonary: Anterior breath sounds clear bilaterally, no crackles or wheezing. No use of accessory muscles  GI: Abdomen soft, non-distended, non-tender  Extremities: Radial and DP pulses +2, no edema    Neurologic:  -Mental status: Awake, alert, oriented to person, place, and time. Speech is fluent with intact naming, repetition, and comprehension, no dysarthria. Recent and remote memory intact. Follows commands. Attention/concentration intact. Fund of knowledge appropriate.  -Cranial nerves:   II: Visual fields are full to confrontation.  III, IV, VI: Extraocular movements are intact without nystagmus. Pupils equally round and reactive to light  V:  Facial sensation V1-V3 equal and intact   VII: Face is symmetric with normal eye closure and smile  VIII: Hearing is bilaterally intact to finger rub  IX, X: Uvula is midline and soft palate rises symmetrically  XI: Head turning and shoulder shrug are intact.  XII: Tongue protrudes midline  Motor: Normal bulk and tone. No pronator drift. Strength bilateral upper extremity 5/5, bilateral lower extremities 5/5.  Rapid alternating movements intact and symmetric  Sensation: Intact to light touch bilaterally. No neglect or extinction on double simultaneous testing.  Coordination: No dysmetria on finger-to-nose and heel-to-shin bilaterally  Reflexes: Downgoing toes bilaterally   Gait: Narrow gait and steady    LABS:                        11.8   5.01  )-----------( 266      ( 28 Feb 2022 05:58 )             36.9     02-28    155<H>  |  121<H>  |  29<H>  ----------------------------<  105<H>  4.0   |  26  |  0.90    Ca    9.0      28 Feb 2022 05:58  Phos  2.9     02-28  Mg     2.4     02-28    TPro  6.8  /  Alb  3.5  /  TBili  0.4  /  DBili  x   /  AST  33  /  ALT  9<L>  /  AlkPhos  64  02-27          RADIOLOGY & ADDITIONAL TESTS:     Neurology Stroke Progress Note    INTERVAL HPI/OVERNIGHT EVENTS:  Patient seen and examined. Patient     MEDICATIONS  (STANDING):  aspirin  chewable 81 milliGRAM(s) Oral daily  atorvastatin 80 milliGRAM(s) Oral at bedtime  heparin   Injectable 5000 Unit(s) SubCutaneous every 12 hours  metoprolol succinate ER 25 milliGRAM(s) Oral daily  pantoprazole    Tablet 40 milliGRAM(s) Oral before breakfast  senna 2 Tablet(s) Oral at bedtime  sodium chloride 0.9%. 1000 milliLiter(s) (50 mL/Hr) IV Continuous <Continuous>    MEDICATIONS  (PRN):  polyethylene glycol 3350 17 Gram(s) Oral daily PRN Constipation    Allergies    No Known Allergies    Intolerances        Vital Signs Last 24 Hrs  T(C): 36.8 (28 Feb 2022 06:30), Max: 37.1 (27 Feb 2022 21:36)  T(F): 98.2 (28 Feb 2022 06:30), Max: 98.8 (28 Feb 2022 01:00)  HR: 108 (28 Feb 2022 04:22) (98 - 116)  BP: 147/91 (28 Feb 2022 04:22) (126/78 - 147/91)  BP(mean): 113 (28 Feb 2022 04:22) (96 - 113)  RR: 18 (28 Feb 2022 04:22) (18 - 20)  SpO2: 99% (28 Feb 2022 04:22) (97% - 100%)    Physical exam:  General: awake and alert  Eyes: Anicteric sclerae, moist conjunctivae, see below for CNs  Neck: trachea midline  Cardiovascular: Tachycardic. no murmurs, rubs, or gallops. No carotid bruits.   Pulmonary: Anterior breath sounds clear bilaterally, no crackles or wheezing. No use of accessory muscles  GI: Abdomen soft, non-distended, non-tender  Extremities: no edema    Neurologic:  -Mental status: Awake, alert, oriented to person, place, and time. Speech is fluent with intact naming, repetition, and comprehension, no dysarthria. Recent and remote memory intact. Follows commands. Attention/concentration intact. Fund of knowledge appropriate.  -Cranial nerves:   II: Visual fields are full to confrontation.  III, IV, VI: Extraocular movements are intact without nystagmus. Pupils equally round and reactive to light  V:  Facial sensation V1-V3 equal and intact   VII: Face is symmetric with normal eye closure and smile  VIII: Hearing is bilaterally intact to finger rub  IX, X: Uvula is midline and soft palate rises symmetrically  XI: Head turning and shoulder shrug are intact.  XII: Tongue protrudes midline  Motor: Normal bulk and tone. No pronator drift. Strength bilateral upper extremity 5/5, bilateral lower extremities 5/5.  Rapid alternating movements intact and symmetric  Sensation: Intact to light touch bilaterally. No neglect or extinction on double simultaneous testing.  Coordination: No dysmetria on finger-to-nose and heel-to-shin bilaterally  Reflexes: Downgoing toes bilaterally   Gait: Narrow gait and steady    LABS:                        11.8   5.01  )-----------( 266      ( 28 Feb 2022 05:58 )             36.9     02-28    155<H>  |  121<H>  |  29<H>  ----------------------------<  105<H>  4.0   |  26  |  0.90    Ca    9.0      28 Feb 2022 05:58  Phos  2.9     02-28  Mg     2.4     02-28    TPro  6.8  /  Alb  3.5  /  TBili  0.4  /  DBili  x   /  AST  33  /  ALT  9<L>  /  AlkPhos  64  02-27          RADIOLOGY & ADDITIONAL TESTS:     Neurology Stroke Progress Note    INTERVAL HPI/OVERNIGHT EVENTS:  Patient seen and examined.  [id number] used. Denies pain. No acute events overnight.     MEDICATIONS  (STANDING):  aspirin  chewable 81 milliGRAM(s) Oral daily  atorvastatin 80 milliGRAM(s) Oral at bedtime  heparin   Injectable 5000 Unit(s) SubCutaneous every 12 hours  metoprolol succinate ER 25 milliGRAM(s) Oral daily  pantoprazole    Tablet 40 milliGRAM(s) Oral before breakfast  senna 2 Tablet(s) Oral at bedtime  sodium chloride 0.9%. 1000 milliLiter(s) (50 mL/Hr) IV Continuous <Continuous>    MEDICATIONS  (PRN):  polyethylene glycol 3350 17 Gram(s) Oral daily PRN Constipation    Allergies    No Known Allergies    Intolerances    Vital Signs Last 24 Hrs  T(C): 36.8 (28 Feb 2022 06:30), Max: 37.1 (27 Feb 2022 21:36)  T(F): 98.2 (28 Feb 2022 06:30), Max: 98.8 (28 Feb 2022 01:00)  HR: 108 (28 Feb 2022 04:22) (98 - 116)  BP: 147/91 (28 Feb 2022 04:22) (126/78 - 147/91)  BP(mean): 113 (28 Feb 2022 04:22) (96 - 113)  RR: 18 (28 Feb 2022 04:22) (18 - 20)  SpO2: 99% (28 Feb 2022 04:22) (97% - 100%)    Physical exam:  General: Laying in bed, no acute distress.  Eyes: Anicteric sclerae, moist conjunctivae, see below for CNs  Neck: trachea midline  Cardiovascular: Tachycardic. no murmurs, rubs, or gallops. No carotid bruits.   Pulmonary: Anterior breath sounds clear bilaterally, no crackles or wheezing. No use of accessory muscles  GI: Abdomen soft, non-distended, non-tender  Extremities: no edema    Neurologic:  -Mental status: Lethargic, easily arousable with verbal stimuli.  Does not answer orientation questions. Following simple commands.   Cranial nerves:   II: Unable to assess 2/2 limited participation by patient  III, IV, VI: Unable to assess EOMs 2/2 limited participation by patient Pupils equally round and reactive to light  VII: Left facial droop present.  XII: Unable to assess 2/2 limited participation by patient  Motor: Decreased bulk and tone. Unable to assess drift. Strength bilateral upper extremity 3/5, RLE 3/5, LLE 2+/5  Sensation: Grossly intact.  Coordination: Unable to assess 2/2 limited participation by patient    LABS:                        11.8   5.01  )-----------( 266      ( 28 Feb 2022 05:58 )             36.9     02-28    155<H>  |  121<H>  |  29<H>  ----------------------------<  105<H>  4.0   |  26  |  0.90    Ca    9.0      28 Feb 2022 05:58  Phos  2.9     02-28  Mg     2.4     02-28    TPro  6.8  /  Alb  3.5  /  TBili  0.4  /  DBili  x   /  AST  33  /  ALT  9<L>  /  AlkPhos  64  02-27        RADIOLOGY & ADDITIONAL TESTS:      MCA CVA 2/20/22 at OSH   CTH left caudate/basal ganglia age indeterminate infarct   CTA head/neck showed focal right MCA M2 branch stenosis secondary to high grade near total occlusion versus focal occlusion, with occlusion of a right MCA anterior distal m2/M3 branch and posterior M2 branch narrowing with somewhat beaded appearance. Occlusion of left vertebral artery within V1 cannot be excluded. 50% left ICA stenosis. mild right ICA stenosis. 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      MRI Brain showing scattered bilateral acute/subacute infarctions, largest in the right basal ganglia and insular region, as well as in bilateral frontal lobes, left basal ganglia, and right parietal lobe.      EEG was negative for seizure.     NCHCT 2/26 - Age-indeterminate infarcts in the right frontal operculum, right corona   radiata, right basal ganglia, and right insular cortex..

## 2022-02-28 NOTE — PROGRESS NOTE ADULT - ASSESSMENT
71M Tajik speaker, HFrEF (25-30%), Pre-DM (5.9), s/p robotic MidCab 2/9/22 at [facility], recently dc who presented to OSH 2/20 w AMS found to have acute MCA CVA, b/l acute infarctions largest in R basal ganglia and insular region - transferred to [facility] for further management - found also to have hypernatremia, GI ulceration for EGD/EUS 2/28, and Bladder mass c/f malignancy    #Multiple strokes - suspect embolic. MCA CVA, B/L acute infarctions in R basal ganglia and insular region   - TSH elevated at last admission. FT4 wnl     #HFrEF - Appears euvolemic. EF 20% on TTE. Currently on Lasix 10 IV, Lopressor 12.5 BID    #CAD - no chest pain. On atorva , lopressor 12.5. CCTA 2/7 - severe calcified pLAD to distal L Main.     #Hypernatremia - Walsh in place and appears dry. Likely 2/2 decreased free water intake. NGT in place Free water deficit 1.5 Lt , Recommend discontinuing 0.9% NaCl  given hypernatremia, and starting  200-300ml Free water through NGT Q4hrs, recheck sodium in am     #GI ulceration vs antral mass - plan for EGD/EUS w GI on MON    #Bladder mass - Urology following and recommended MRI vs CT renal protocol. UA w cytology for possible malignancy. Recommended for outpatient cystoscopy   - Walsh in place      DISPO: Pending PT/OT  Appreciate SW/CM assistance in identifying HCP

## 2022-02-28 NOTE — CONSULT NOTE ADULT - SUBJECTIVE AND OBJECTIVE BOX
Consult requested by: Clinical Team	   				     Service: Stroke  Attending: Nazia Joiner MD  Consultant: Cindy Lawrence MS, [facility]- (Medical Ethicist)                             Contact #s: [phone] (c)  [phone] (o)  Consult purpose:  To assist the healthcare team in an ethical dilemma of a 71-year-old male admitted for further management and workup of cerebral vascular accident, currently with guarded prognosis and unclear surrogacy.  									  Clinical summary: This is a 71-year-old Upper sorbian speaking male with a past medical history of smoking (very recently quit, smoked x40 years), chronic heart failure (ejection fraction 25-30%) and prediabetes (A1c 5.9). Patient underwent robotic minimally invasive direct CAB on 2/9/2022, was ambulating well, and discharged on post op day 4. Patient returned to outside hospital on 2/20/2022 with altered mental status. MRI showed bilateral embolic appearing strokes with the largest in the right basal ganglia and insural region. Patient underwent ECHO, EEG and cerebral angiogram at outside hospital. EEG negative for seizure. 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 Patient transferred to [facility] on 2/24 for further management and workup. Patient initially admitted to CT surgery but transferred to stroke service given CVA as primary reason for admission. Currently, patient pending TTE and JOEY. Patient’s course has been complicated by hypernatremia and known bladder and gastric masses, pending EGD and/or EUS. Gastroenterology (GI) and Urology () consulted on 2/24 given stomach antrum ulceration and bladder mass on CT scan. Per GI note on 2/24, patient will need neurologic evaluation/optimization and recommendations prior to any endoscopic procedure. Per  note on 2/24, patient recommended for urine cytology, MRI renal mass protocol or CT renal mass protocol to characterize lesion. On 2/24, stroke team consulted for recent stroke findings. Patient failed a dysphagia screen, but patient evaluated by Speech Language Pathology (SLP) and was recommended for soft, bite sized diet with mild thick liquids. On 2/25, Cardiology consulted for heart failure management. On 2/26, patient started on D5w for hypernatremia due to suspected insufficient PO intake at 70mL/hr. CT head scan performed on 2/26 showing age-indeterminate infarcts in the right frontal operculum, right corona radiata, right basal ganglia and right insular cortex. Overnight on 2/26, patient agitated and appeared to be in pain. On 2/27, patient with elevated sodium level (153). On 2/28, NGT placed and EGD/EUS ordered. Per Hospitalist note on 2/28, patient able to state name and nod/shake head, move all extremities on command. However, patient has decreased fluency and has not been able to state yes/no consistently or answer open-ended questions. As of 2/28, patient remains significantly hypernatremic with a significant FWD. Physical Therapy evaluated patient on 2/28 and patient being recommended for acute rehab. Palliative on consult. Ethics consult initiated to clarify appropriate surrogate decision maker for a patient with a guarded prognosis.      Prognosis Estimate (survival in days, wks, mos, yrs):	 Guarded				  Patient Decision-Making Capacity:    Has capacity 	  Lacks capacity (patient with recent stroke and has altered mental status)  Patient Aware of:  Diagnosis:   Yes    No   Unknown    Prognosis:   Yes    No   Unknown       Name of medical decision-maker should patient lack capacity: Pascual Sands	   Relationship: Friend  Role:   Health Care Proxy      Legal Surrogate   Contact #(s): [phone]  Other Stake-Holders:  NONE  Evidence of Patient’s Preference of Life-Sustaining Treatment (Written or Oral): None  Resuscitation status:  DNR:  Yes   No      DNI:  Yes   No    Discussions:   Discussion with Clinical Team on 2/28/22 (13:15 – 13:30): Discussed patient’s case and clinical condition. Patient has a roommate who he has lived with for about 5 years. Roommate stated she does not know of any patient's family members. Per chart notes, roommate and patient live fairly independent of each other, but she wants what is best for him. She is comfortable making simple decisions for patient, but would not want to make complicated decisions like cancer treatment. As patient has no known family members, Ethics advised that Pascual Sands is an appropriate surrogate decision maker for patient, according to [facility] Family Health Care Decision Act (see Analysis). Ethics also recommended patient’s capacity be clinically optimized to the best of its ability, as patient may be able to name a different person to make decisions on his behalf, should he want and have capacity. Ethics to remain involved as patient’s hospital course progresses and further decisions points occur.    Bioethics analysis:  The central ethical issue that exists in this case is (A) respecting the patient’s autonomy versus (B) the providers’ desire for beneficence and non-maleficence. The conflict that exists in this situation is one hospital clinicians may encounter for patients who have an undetermined prognosis for recovery. The bioethical principle of autonomy – here represented by a thoughtful consideration of the appropriate surrogate to make decisions for a patient without capacity. Physicians and health care providers have a role in protecting the patient from harm by safeguarding the patient’s best interests through determining the qualifications of the surrogate decision maker. In this case, the health team is applauded for doing due diligence to locate the patient’s surrogate decision maker and creating an atmosphere of clarity in communication regarding finding the appropriate primary surrogate decision maker.    When a patient without capacity has not created an advanced directive, informed consent should be obtained from decision makers with the following qualifications:  1. Ability to make reasoned judgments (competence)  2. Adequate knowledge and information  3. Emotional stability  4. A commitment to the incapacitated patient’s interests, free of conflicts of interest and free of controlling influence by those who might not act in the patient’s best interest.    The 2010 [facility] Health Care Decision Act (CDA)1 addresses the situation of a sick individual who lacks capacity but has an available surrogate: the surrogate has the exclusive right to make decisions about medical care and discharge planning for the patient. The ethical standard the surrogate is supposed to apply to such decision-making is first "substituted judgment" – based on the patient’s prior articulation of preferences for such a situation or second "best interests." The CDA establishes a hierarchy of surrogacy, in order of priority decision maker for the incapable patient:  – an [facility] Article 81 court-appointed guardian (in this case, there is not one);  – the spouse or domestic partner as defined in the CDA ([facility] § 2994-a);  – adult children;  – a parent(s);  – a brother and/or sister;  – a close friend/all other family members (nieces, nephews, etc.)    One person from the list must be reasonably available, willing, and competent to act.2 Such person shall be the surrogate provided no other person in a class higher in priority than the person designated is available.2 Also, that person may designate any other person on the list to be surrogate, provided no one in a class higher in priority than the person designated objects.2    In this case, the patient has a friend, Soon, who is available and willing to make decisions on the patient’s behalf and is the appropriate surrogate decision maker according to the [facility] (above).  Consult requested by: Clinical Team	   				     Service: Stroke  Attending: Nazia Joiner MD  Consultant: Cindy Lawrence MS, [facility]- (Medical Ethicist)                             Contact #s: [phone] (c)  [phone] (o)  Consult purpose:  To assist the healthcare team in an ethical dilemma of a 71-year-old male admitted for further management and workup of cerebral vascular accident, currently with guarded prognosis and unclear surrogacy.  									  Clinical summary: This is a 71-year-old Frisian speaking male with a past medical history of smoking (very recently quit, smoked x40 years), chronic heart failure (ejection fraction 25-30%) and prediabetes (A1c 5.9). Patient underwent robotic minimally invasive direct CAB on 2/9/2022, was ambulating well, and discharged on post op day 4. Patient returned to outside hospital on 2/20/2022 with altered mental status. MRI showed bilateral embolic appearing strokes with the largest in the right basal ganglia and insural region. Patient underwent ECHO, EEG and cerebral angiogram at outside hospital. EEG negative for seizure. 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 Patient transferred to [facility] on 2/24 for further management and workup. Patient initially admitted to CT surgery but transferred to stroke service given CVA as primary reason for admission. Currently, patient pending TTE and JOEY. Patient’s course has been complicated by hypernatremia and known bladder and gastric masses, pending EGD and/or EUS. Gastroenterology (GI) and Urology () consulted on 2/24 given stomach antrum ulceration and bladder mass on CT scan. Per GI note on 2/24, patient will need neurologic evaluation/optimization and recommendations prior to any endoscopic procedure. Per  note on 2/24, patient recommended for urine cytology, MRI renal mass protocol or CT renal mass protocol to characterize lesion. On 2/24, stroke team consulted for recent stroke findings. Patient failed a dysphagia screen, but patient evaluated by Speech Language Pathology (SLP) and was recommended for soft, bite sized diet with mild thick liquids. On 2/25, Cardiology consulted for heart failure management. On 2/26, patient started on D5w for hypernatremia due to suspected insufficient PO intake at 70mL/hr. CT head scan performed on 2/26 showing age-indeterminate infarcts in the right frontal operculum, right corona radiata, right basal ganglia and right insular cortex. Overnight on 2/26, patient agitated and appeared to be in pain. On 2/27, patient with elevated sodium level (153). On 2/28, NGT placed and EGD/EUS ordered. Per Hospitalist note on 2/28, patient able to state name and nod/shake head, move all extremities on command. However, patient has decreased fluency and has not been able to state yes/no consistently or answer open-ended questions. As of 2/28, patient remains significantly hypernatremic with a significant free water deficit. Physical Therapy evaluated patient on 2/28 and patient being recommended for acute rehab. Palliative on consult. Ethics consult initiated to clarify appropriate surrogate decision maker for a patient with a guarded prognosis.      Prognosis Estimate (survival in days, wks, mos, yrs):	 Guarded				  Patient Decision-Making Capacity:    Has capacity 	  Lacks capacity (patient with recent stroke and has altered mental status)  Patient Aware of:  Diagnosis:   Yes    No   Unknown    Prognosis:   Yes    No   Unknown       Name of medical decision-maker should patient lack capacity: Pascual Sands	   Relationship: Friend  Role:   Health Care Proxy      Legal Surrogate   Contact #(s): [phone]  Other Stake-Holders:  NONE  Evidence of Patient’s Preference of Life-Sustaining Treatment (Written or Oral): None  Resuscitation status:  DNR:  Yes   No      DNI:  Yes   No    Discussions:   Discussion with Clinical Team on 2/28/22 (13:15 – 13:30): Discussed patient’s case and clinical condition. Patient has a roommate who he has lived with for about 5 years. Roommate stated she does not know of any patient's family members. Per chart notes, roommate and patient live fairly independent of each other, but she wants what is best for him. She is comfortable making simple decisions for patient, but would not want to make complicated decisions like cancer treatment. As patient has no known family members, Ethics advised that Pascual Sands is an appropriate surrogate decision maker for patient, according to [facility] Family Health Care Decision Act (see Analysis). Ethics also recommended patient’s capacity be clinically optimized to the best of its ability, as patient may be able to name a different person to make decisions on his behalf, should he want and have capacity. Ethics to remain involved as patient’s hospital course progresses and further decisions points occur.    Bioethics analysis:  The central ethical issue that exists in this case is (A) respecting the patient’s autonomy versus (B) the providers’ desire for beneficence and non-maleficence. The conflict that exists in this situation is one hospital clinicians may encounter for patients who have an undetermined prognosis for recovery. The bioethical principle of autonomy – here represented by a thoughtful consideration of the appropriate surrogate to make decisions for a patient without capacity. Physicians and health care providers have a role in protecting the patient from harm by safeguarding the patient’s best interests through determining the qualifications of the surrogate decision maker. In this case, the health team is applauded for doing due diligence to locate the patient’s surrogate decision maker and creating an atmosphere of clarity in communication regarding finding the appropriate primary surrogate decision maker.    When a patient without capacity has not created an advanced directive, informed consent should be obtained from decision makers with the following qualifications:  1. Ability to make reasoned judgments (competence)  2. Adequate knowledge and information  3. Emotional stability  4. A commitment to the incapacitated patient’s interests, free of conflicts of interest and free of controlling influence by those who might not act in the patient’s best interest.    The 2010 [facility] Health Care Decision Act (CDA)1 addresses the situation of a sick individual who lacks capacity but has an available surrogate: the surrogate has the exclusive right to make decisions about medical care and discharge planning for the patient. The ethical standard the surrogate is supposed to apply to such decision-making is first "substituted judgment" – based on the patient’s prior articulation of preferences for such a situation or second "best interests." The CDA establishes a hierarchy of surrogacy, in order of priority decision maker for the incapable patient:  – an [facility] Article 81 court-appointed guardian (in this case, there is not one);  – the spouse or domestic partner as defined in the CDA ([facility] § 2994-a);  – adult children;  – a parent(s);  – a brother and/or sister;  – a close friend/all other family members (nieces, nephews, etc.)    One person from the list must be reasonably available, willing, and competent to act.2 Such person shall be the surrogate provided no other person in a class higher in priority than the person designated is available.2 Also, that person may designate any other person on the list to be surrogate, provided no one in a class higher in priority than the person designated objects.2    In this case, the patient has a friend, Soon, who is available and willing to make decisions on the patient’s behalf and is the appropriate surrogate decision maker according to the [facility] (above).

## 2022-02-28 NOTE — PROGRESS NOTE ADULT - SUBJECTIVE AND OBJECTIVE BOX
Patient is a 71y old  Male who presents with a chief complaint of CVA (28 Feb 2022 13:22)        SUBJECTIVE:  Patient was seen and examined at bedside. History was obtained by using  ( # [id number]) , patient awake and follows instructions , but does not really talk much even while using the , during my conversation he was only able to tell me his name     Review of systems: unable to evaluate appropriately as he is not answering questions     Diet, DASH/TLC:   Sodium & Cholesterol Restricted  Soft and Bite Sized (SOFTBTSZ)  Mildly Thick Liquids (MILDTHICKLIQS)  Supplement Feeding Modality:  Oral  Ensure Enlive Cans or Servings Per Day:  1       Frequency:  Two Times a day (02-25-22 @ 16:42) [Active]      MEDICATIONS:  MEDICATIONS  (STANDING):  aspirin  chewable 81 milliGRAM(s) Oral daily  atorvastatin 80 milliGRAM(s) Oral at bedtime  heparin   Injectable 5000 Unit(s) SubCutaneous every 12 hours  metoprolol succinate ER 25 milliGRAM(s) Oral daily  pantoprazole    Tablet 40 milliGRAM(s) Oral before breakfast  senna 2 Tablet(s) Oral at bedtime    MEDICATIONS  (PRN):  polyethylene glycol 3350 17 Gram(s) Oral daily PRN Constipation      Allergies    No Known Allergies    Intolerances        OBJECTIVE:  Vital Signs Last 24 Hrs  T(C): 36.3 (28 Feb 2022 10:22), Max: 37.1 (27 Feb 2022 21:36)  T(F): 97.3 (28 Feb 2022 10:22), Max: 98.8 (28 Feb 2022 01:00)  HR: 102 (28 Feb 2022 13:41) (100 - 116)  BP: 139/81 (28 Feb 2022 13:41) (126/78 - 151/86)  BP(mean): 104 (28 Feb 2022 13:41) (96 - 113)  RR: 18 (28 Feb 2022 13:41) (18 - 20)  SpO2: 98% (28 Feb 2022 13:41) (97% - 99%)  I&O's Summary    27 Feb 2022 07:01  -  28 Feb 2022 07:00  --------------------------------------------------------  IN: 650 mL / OUT: 575 mL / NET: 75 mL        PHYSICAL EXAM:  Gen: Reclining in bed at time of exam, not in distress   HEENT: moist mucosa, no lesions , nGt tube in place   Neck: supple, trachea at midline  CV: RRR, +S1/S2  Pulm: adequate respiratory effort, no increase in work of breathing  Abd: soft, NTND  Skin: warm and dry, no new rashes vs prior report  Ext: WWP, no LE edema  Neuro: awake, following instructions intermittently   Psych: unable to evaluate     LABS:                        11.8   5.01  )-----------( 266      ( 28 Feb 2022 05:58 )             36.9     02-28    155<H>  |  121<H>  |  29<H>  ----------------------------<  105<H>  4.0   |  26  |  0.90    Ca    9.0      28 Feb 2022 05:58  Phos  2.9     02-28  Mg     2.4     02-28    TPro  6.8  /  Alb  3.5  /  TBili  0.4  /  DBili  x   /  AST  33  /  ALT  9<L>  /  AlkPhos  64  02-27    LIVER FUNCTIONS - ( 27 Feb 2022 06:11 )  Alb: 3.5 g/dL / Pro: 6.8 g/dL / ALK PHOS: 64 U/L / ALT: 9 U/L / AST: 33 U/L / GGT: x             CAPILLARY BLOOD GLUCOSE            MICRODATA:      RADIOLOGY/OTHER STUDIES:

## 2022-02-28 NOTE — PHYSICAL THERAPY INITIAL EVALUATION ADULT - MANUAL MUSCLE TESTING RESULTS, REHAB EVAL
2/2 cognition; UE/LE strength grossly assessed with functional mobility. Bilaterally pt strength is equal/greater than 3/5.

## 2022-02-28 NOTE — PROGRESS NOTE ADULT - ASSESSMENT
71M Welsh speaking smoker (very recently quit, smoked x 40 years) with PMHx of CHF (EF 25-30%) and prediabetes (A1c 5.9) who is now s/p robotic MIDCAB with Dr. Santos on 02/09/2022, ambulating well and discharged on POD 4, returned to OSH on 02/20/2022 with AMS, MRI showed bilateral embolic appearing strokes. Pt had ECHO, EEG and cerebral angio done at OSH as well. Patient was transferred to [facility] for further MGMT and work up, initially to CT surg but then tx to stroke service given CVA as primary reason for admission. He is pending TTE and JOEY. Course has been complicated by hypernatremia as well as known bladder and gastric masses, pending EGD +/- EUS.     Neuro     #CVA  - continue aspirin 81mg daily   - holding plavix while pending EGD with GI  - continue atorvastatin 80mg daily   - q4hr stroke neuro checks and vitals   - TTE with bubble pending  - JOEY pending, will be completed on same day as EGD.   - b/l LE US 2/26 - negative for DVT    Cardiology    #HTN   - Goal SBP <180  - continue metoprolol succinate ER 25mg QD      #Heart failure   EF 20%   - Avoid fluid overload    - I/Os    - CT surgery consulted, appreciate recommendations    #HLD   - high dose statin as above in CVA   - LDL results: 90     Pulm   No active issues  - call provider if SPO2 < 94%     GI     # Stomach mass   - EGD +/- EUS  - GI wants hypernatremia to improve prior to study   - GI following, appreciate recs    #Nutrition/Fluids/Electrolytes    - replete K<4 and Mg <2   - Diet: DASH/TLC - pending NGT placement for free water boluses.   - IVF: NS at 50cc/hr      Renal  - Daily BMP  - US Retroperitoneal Complete ordered - renal lesion seen on previous CT A/P    #bladder mass  - urine cytology ordered  - maintain evans, will be discharged with as per   -  following, appreciate recs    Hematology  #hypernatremia ?chronic  - will place NGT today  - free water boluses  - daily BMP    Infectious Disease   No active issue      Endocrine   A1c 5.4  TSH 7.64     DVT Prophylaxis   - heparin x sq  - SCDs for DVT prophylaxis      IDR Goals: Goals reviewed at interdisciplinary rounds with case management, social work, physical therapy, occupational therapy, and speech language pathology.   Please see specific therapy  notes for in depth goals.  Dispo: pending PT/OT eval     Discussed daily hospital plans and goals with patient and have updated patient's roommate. Palliative and ethics are being consulted given patient's lack of family and patient unable to make healthcare decisions for self at this time.     Discussed with Neurology Attending Dr. Nazia Joiner

## 2022-02-28 NOTE — PHYSICAL THERAPY INITIAL EVALUATION ADULT - FOLLOWS COMMANDS/ANSWERS QUESTIONS, REHAB EVAL
unable to confirm aphasia; pt responding to cues inconsistently and answering questions inconsistently. Pt responds to short English cuing with gestures more than  but responds in Com[place]an./25% of the time/able to follow single-step instructions

## 2022-02-28 NOTE — PROGRESS NOTE ADULT - ATTENDING COMMENTS
The patient is a 71-year-old Chinese speaking male with a history of recent tobacco dependence, CHF with reduced ejection fraction (20%), prediabetes, and CAD status post robotic MIDCAB 2//9/2022.  He was discharged neurologically intact.  He presented 2/20/2022 with altered mental status with work-up revealing multifocal, bilateral acute and subacute infarcts by report of unclear mechanism though occult a fib, aortic athero, and malignancy driving hypercoagulable state are possibilities.    Outside images in transit. Repeat HCT shows several R-sided subcortical subacute strokes. Plan to repeat TTE (with bubble) and JOEY along with EGD/EUS for gastric mass per GI pending improvement of hypernatremia.  Will f/u urinary cytology per urology given concern for bladder cancer.  For now, will continue aspirin alone given potential need for biopsy; will consider IV heparin pending review of images given concern for embolic process and/or hypercoagulable state related to malignancy. Continue statin.    The patient remains significantly hypernatremic with a significant FWD. Appreciate IM input. Will give gentle isotonic fluids and place NGT for FWF given poor oral intake.

## 2022-02-28 NOTE — PROGRESS NOTE ADULT - SUBJECTIVE AND OBJECTIVE BOX
HPI:  This is a 71 year old Tongan speaking male, smoker (very recently quit, smoked x 40 years) with PMHx of CHF (EF 25-30%) and prediabetes (A1c 5.9) who is now s/p robotic MIDCAB with Dr. Santos on 02/09/2022.  Post OR he was extubated on POD 0, started on beta blockers by POD 2 and transferred to telemetry.  By POD 4 he was ambulating independently, on room air, having normal BM's, tolerating PO diet with surgical pain under control. He was discharged home on POD 4.  On 02/20/2022 patient presented to OSH with AMS. Work up included CT without, CTA Head and Neck and MRI Jerry showing acute MCA CVA, b/l acute inracts  with the largest in the right basal ganglia and insular region. EEG negative for seizure. TTE = ? PFO. Patient transfer to [facility] for further MGMT and work up.      PMH/PSH:   See HPI.    SH:  40 years of 1ppd smoking, stopped 01/2022  Alcohol: 1 drink per night, stopped 01/2022  Elicit drugs: No  Occupation: ?  Home: Yes    FH:  N/A    Allergies:   See above.     Home Rx.:   See below. (23 Feb 2022 17:49)    Ethics:  Ethics consult initiated. Case discussed with primary team regarding appropriate surrogate decision maker given patient has no next of kin. Per [facility] Care Decision Act, roommate Pascual Sands would fall under the close friend category and thus is an appropriate decision maker for Mr. Mcmahon.    Full consult to follow.    Cindy Lawrence MS, [facility]  Medical Ethicist  [phone]

## 2022-02-28 NOTE — PHYSICAL THERAPY INITIAL EVALUATION ADULT - LEVEL OF INDEPENDENCE: SUPINE/SIT, REHAB EVAL
Pt able to initially following commands for LE movement; required assist to complete task./moderate assist (50% patients effort)

## 2022-02-28 NOTE — PHYSICAL THERAPY INITIAL EVALUATION ADULT - MODALITIES TREATMENT COMMENTS
Cranial nerve exam limited 2/2 cognition. Cranial Nerves II - XII: II: pt able to mimic visual cues for LE mobility III, IV, VI: CHATO  V: CHATO VII: CHATO VIII: CHATO XI: head turning intact b/l;  shoulder shrug decreased on R b/l XII: CHATO

## 2022-02-28 NOTE — PROGRESS NOTE ADULT - ATTENDING COMMENTS
Given inability to directly consent the patient, should plan for egd/eus to minimize need for diagnostic study.   agree optimize and appreciate ethics recc.

## 2022-02-28 NOTE — CONSULT NOTE ADULT - PROBLEM SELECTOR RECOMMENDATION 2
- Patient is currently on DASH diet, with aspiration precautions in place.  - Did not tolerate NGT.  - Mouth care is appropriate.  - Frequent reassessment by speech and swallow.

## 2022-02-28 NOTE — CONSULT NOTE ADULT - SUBJECTIVE AND OBJECTIVE BOX
[facility]  Siva Burt Palliative Care Attending  Contact Info: Call [phone] (HEAL Line) or message on Microsoft Teams    HPI:  This is a 71 year old Bulgarian speaking male, smoker (very recently quit, smoked x 40 years) with PMHx of CHF (EF 25-30%) and prediabetes (A1c 5.9) who is now s/p robotic MIDCAB with Dr. Santos on 02/09/2022.  Post OR he was extubated on POD 0, started on beta blockers by POD 2 and transferred to telemetry.  By POD 4 he was ambulating independently, on room air, having normal BM's, tolerating PO diet with surgical pain under control. He was discharged home on POD 4.  On 02/20/2022 patient presented to OSH with AMS. Work up included CT without, CTA Head and Neck and MRI Jerry showing acute MCA CVA, b/l acute inracts  with the largest in the right basal ganglia and insular region. EEG negative for seizure. TTE = ? PFO. Patient transfer to [facility] for further MGMT and work up.      PMH/PSH:   See HPI.    SH:  40 years of 1ppd smoking, stopped 01/2022  Alcohol: 1 drink per night, stopped 01/2022  Elicit drugs: No  Occupation: ?  Home: Yes    FH:  N/A    Allergies:   See above.     Home Rx.:   See below. (23 Feb 2022 17:49)    PERTINENT PM/SXH:   Acute on chronic combined systolic and diastolic congestive heart failure    Prediabetes    Smoking hx      No significant past surgical history    No significant past surgical history      FAMILY HISTORY:    ITEMS NOT CHECKED ARE NOT PRESENT    SOCIAL HISTORY:   Significant other/partner:  []  Children:  []   Substance hx:  [x]   Tobacco hx:  []   Alcohol hx: []   Home Opioid hx:  [] I-Stop Reference No:  - no active Rx's / see chart note  Living Situation: [x]Home  []Long term care  []Rehab []Other  Lutheran/Spiritual practice: ; Role of organized Pentecostalism [ ] important [ ] some [x ] unable to assess  Coping: [ ] well [ ] with difficulty [ ] poor coping [ x] unable to assess  Support system: [ ] strong [ ] adequate [x ] inadequate    ADVANCE DIRECTIVES:    []MOLST  []Living Will  DECISION MAKER(s):  [] Health Care Proxy(s)  [x] Surrogate(s)  [] Guardian           Name(s)/Phone Number(s): Pascual Sands (friend)     BASELINE (I)ADLs (prior to admission):  [place]: [x]Total  [] Moderate []Dependent    ALLERGIES:  No Known Allergies    MEDICATIONS  (STANDING):  aspirin  chewable 81 milliGRAM(s) Oral daily  atorvastatin 80 milliGRAM(s) Oral at bedtime  heparin   Injectable 5000 Unit(s) SubCutaneous every 12 hours  metoprolol succinate ER 25 milliGRAM(s) Oral daily  pantoprazole    Tablet 40 milliGRAM(s) Oral before breakfast  senna 2 Tablet(s) Oral at bedtime    MEDICATIONS  (PRN):  OLANZapine 2.5 milliGRAM(s) Oral once PRN agitation  polyethylene glycol 3350 17 Gram(s) Oral daily PRN Constipation    PRESENT SYMPTOMS: [x]Unable to obtain due to poor mentation/encephalopathy  Source if other than patient:  []Family   []Team     Pain: [ ] yes [x ] no  QOL impact -   Location -                    Aggravating Factors -  Quality -  Radiation -  Timing -  Severity (0-10 scale) -   Minimal Acceptable Level (0-10 scale) -    PAIN AD Score:  http://geriatrictoolkit.[email or web address]/painad.pdf (press ctrl +  left click to view)    Dyspnea:                           [ ]Mild  [ ]Moderate [ ]Severe  Anxiety:                             [ ]Mild [ ]Moderate [ ]Severe  Fatigue:                             [ ]Mild [ ]Moderate [ ]Severe  Nausea:                             [ ]Mild [ ]Moderate [ ]Severe  Loss of Appetite:             [ ]Mild [ ]Moderate [ ]Severe  Constipation:                   [ ]Mild [ ]Moderate [ ]Severe    Other Symptoms:  [x ]All other review of systems negative     Palliative Performance Status Version 2: 30-40 %    http://npcrc.org/files/news/palliative_performance_scale_ppsv2.pdf    PHYSICAL EXAM:  GENERAL:  [ ]Alert  [ ]Oriented x   [ x]Lethargic  [ ]Cachexia  [ ]Unarousable  [ ]Verbal  [ ]Non-Verbal  Behavioral:   [ ]Anxiety  [ ]Delirium [ ]Agitation [ x]Cooperative  HEENT:  [ ]Normal   [x ]Dry mouth   [ ]ET Tube/Trach  [ ]Oral lesions  PULMONARY:   [ x]Clear []Tachypnea  []Audible excessive secretions   [ ]Rhonchi        [ ]Right [ ]Left [ ]Bilateral  [ ]Crackles        [ ]Right [ ]Left [ ]Bilateral  [ ]Wheezing     [ ]Right [ ]Left [ ]Bilateral  CARDIOVASCULAR:    [x ]Regular [ ]Irregular [ ]Tachy  [ ]Ke [ ]Murmur [ ]Other  GASTROINTESTINAL:  [x]Soft  [ ]Distended   [ x]+BS  [ x]Non tender [ ]Tender  [ ]PEG [ ]OGT/ NGT  Last BM:     GENITOURINARY:  [x ]Normal [ ] Incontinent   [ ]Oliguria/Anuria   [ ][place]  MUSCULOSKELETAL:   [ ]Normal   [ ]Weakness  [x]Bed/Wheelchair bound [ ]Edema  NEUROLOGIC:   [ ]No focal deficits  [x ]Cognitive impairment  [ ]Dysphagia [ ]Dysarthria [ ]Paresis [x ]Encephalopathic   SKIN:   [x ]Normal   [ ]Pressure ulcer(s)  [ ]Rash    CRITICAL CARE:  [ ]Shock Present  [ ]Septic [ ]Cardiogenic [ ]Neurologic [ ]Hypovolemic  [ ]Vasopressors [ ]Inotropes   [ ]Respiratory failure present [ ]Mechanical Ventilation [ ]Non-invasive ventilatory support [ ]High-Flow  [ ]Acute  [ ]Chronic [ ]Hypoxic  [ ]Hypercarbic  [ ]Other organ failure    Vital Signs Last 24 Hrs  T(C): 36.5 (02 Mar 2022 06:08), Max: 36.7 (02 Mar 2022 01:08)  T(F): 97.7 (02 Mar 2022 06:08), Max: 98 (02 Mar 2022 01:08)  HR: 92 (02 Mar 2022 09:10) (78 - 114)  BP: 138/84 (02 Mar 2022 09:10) (122/78 - 162/100)  BP(mean): 106 (02 Mar 2022 09:10) (95 - 125)  RR: 20 (02 Mar 2022 06:13) (16 - 24)  SpO2: 96% (02 Mar 2022 09:10) (90% - 99%) I&O's Summary    01 Mar 2022 07:01  -  02 Mar 2022 07:00  --------------------------------------------------------  IN: 0 mL / OUT: 400 mL / NET: -400 mL        LABS:                        12.1   4.89  )-----------( 223      ( 02 Mar 2022 06:03 )             40.0   03-02    152<H>  |  118<H>  |  30<H>  ----------------------------<  85  3.8   |  21<L>  |  0.91    Ca    9.6      02 Mar 2022 06:03  Phos  3.2     03-01  Mg     2.4     03-01    TPro  6.6  /  Alb  3.3  /  TBili  0.4  /  DBili  x   /  AST  30  /  ALT  19  /  AlkPhos  65  03-02      RADIOLOGY & ADDITIONAL STUDIES:      PROTEIN CALORIE MALNUTRITION PRESENT: [ ]mild [ ]moderate [ ]severe [ ]underweight [ ]morbid obesity  [ ]PPSV2 < or = to 30% [ ]significant weight loss  [ ]poor nutritional intake [ ]catabolic state [ ]anasarca     Artificial Nutrition [ ]     REFERRALS:  [x]Social Work  [ ]Case management [ ]PT/OT [ ]Chaplaincy  [ ]Hospice  [ ]Patient/Family Support    DISCUSSION OF CASE: Family - to obtain additional history and to provide emotional support; ( ) -       PALLIATIVE MEDICINE COORDINATION OF CARE DOCUMENTATION: [x] Inpatient Consult  Non-Face-to-Face prolonged service provided that relates to (face-to-face) care that has or will occur and ongoing patient management, including one or more of the following: - Reviewed documentation from other physicians and other health care professional services - Reviewed medical records and diagnostic / radiology study results - Coordination with patient's support system  ************************************************************************  MEDICATION REVIEW:  - See Medication List Above    ISTOP REFERENCE:   - no active Rx's / see ISTOP Chart Note  - PRN usage: NO PRN'S  ------------------------------------------------------------------------  COORDINATION OF CARE:  - Palliative Care consulted for: GOC / Symptom Management  - Patient (to be) assessed: 2/2/28/22  - Patient previously seen by Palliative Care service: NO    ADVANCE CARE PLANNING  - Code status: FULL  - MOLST reviewed in chart: NONE; None found on Alpha  - HCP/Surrogate: Pascual Sands (friend)  - GOC documents: NONE found on [place]  - HCP/Living will/Other Advanced Directives in Alpha: NONE found on Alpha  ------------------------------------------------------------------------  CARE PROVIDER DOCUMENTATION:  - SW/CM notes: Remains medically active  -   -   -     PLAN OF CARE  - Known admissions in past year: 1   - Current admit date: 2/24/22  - LOS: 4  - LACE score: 9   - Current dispo plan: TO BE DETERMINED    02-24-22 (6d)  ------------------------------------------------------------------------  - Time Spent/Chart reviewed: 41 Minutes [including time used to gather, review and transfer data]  - Start: 0900  - End:  0941    Prolonged services rendered, as part of this patient's care provided by Palliative Medicine, include: i. chart review for provider and ancillary service documentation, ii. pertinent diagnostics including laboratory and imaging studies, iii. medication review including PRN use, iv. admission history including previous palliative care encounters and GOC notes, v. advance care planning documents including HCP and MOLST forms in Alpha. Part of Palliative Medicine extended evaluation and management also involves coordination of care with our IDT, the primary and consulting teams, and unit CM/SW and Hospice if eligible. Recommendations based on the information gathered and discussed are outlined in the A/P of Palliative notes.

## 2022-02-28 NOTE — PHYSICAL THERAPY INITIAL EVALUATION ADULT - PLANNED THERAPY INTERVENTIONS, PT EVAL
balance training/bed mobility training/gait training/motor coordination training/neuromuscular re-education/postural re-education/ROM/strengthening/stretching/transfer training

## 2022-02-28 NOTE — PHYSICAL THERAPY INITIAL EVALUATION ADULT - TRANSFER SAFETY CONCERNS NOTED: SIT/STAND, REHAB EVAL
decreased balance during turns/decreased weight-shifting ability Pt performed STS at bedside and had two episodes of fecal incontinence/decreased balance during turns/decreased weight-shifting ability

## 2022-02-28 NOTE — PROGRESS NOTE ADULT - SUBJECTIVE AND OBJECTIVE BOX
GASTROENTEROLOGY PROGRESS NOTE  Patient seen and examined at bedside. Remains non-verbal.    PERTINENT REVIEW OF SYSTEMS:  Unable to obtain    Allergies    No Known Allergies    Intolerances      MEDICATIONS:  MEDICATIONS  (STANDING):  aspirin  chewable 81 milliGRAM(s) Oral daily  atorvastatin 80 milliGRAM(s) Oral at bedtime  heparin   Injectable 5000 Unit(s) SubCutaneous every 12 hours  metoprolol succinate ER 25 milliGRAM(s) Oral daily  pantoprazole    Tablet 40 milliGRAM(s) Oral before breakfast  senna 2 Tablet(s) Oral at bedtime    MEDICATIONS  (PRN):  polyethylene glycol 3350 17 Gram(s) Oral daily PRN Constipation    Vital Signs Last 24 Hrs  T(C): 36.3 (28 Feb 2022 10:22), Max: 37.1 (27 Feb 2022 21:36)  T(F): 97.3 (28 Feb 2022 10:22), Max: 98.8 (28 Feb 2022 01:00)  HR: 106 (28 Feb 2022 08:40) (106 - 116)  BP: 151/86 (28 Feb 2022 08:40) (126/78 - 151/86)  BP(mean): 112 (28 Feb 2022 08:40) (96 - 113)  RR: 18 (28 Feb 2022 08:40) (18 - 20)  SpO2: 98% (28 Feb 2022 08:40) (97% - 99%)    02-27 @ 07:01  -  02-28 @ 07:00  --------------------------------------------------------  IN: 650 mL / OUT: 575 mL / NET: 75 mL      PHYSICAL EXAM:    General: NAD  HEENT: MMM, conjunctiva and sclera clear  Gastrointestinal: Thin, scaphoid, non-tender  Skin: Warm and dry. No obvious rash    LABS:                        11.8   5.01  )-----------( 266      ( 28 Feb 2022 05:58 )             36.9     02-28    155<H>  |  121<H>  |  29<H>  ----------------------------<  105<H>  4.0   |  26  |  0.90    Ca    9.0      28 Feb 2022 05:58  Phos  2.9     02-28  Mg     2.4     02-28    TPro  6.8  /  Alb  3.5  /  TBili  0.4  /  DBili  x   /  AST  33  /  ALT  9<L>  /  AlkPhos  64  02-27                      RADIOLOGY & ADDITIONAL STUDIES:  Reviewed

## 2022-02-28 NOTE — CONSULT NOTE ADULT - ASSESSMENT
Recommendation: According to the 2010 [facility] Family Health Care Decision Act, Pascual Sands, the patient’s roommate and friend, is the appropriate surrogate decision maker for Mr. Arias, as patient continues to lack capacity. Ethics recommends optimization of patient’s capacity to ascertain if patient has any family members and/or a different person he would like to make decisions for him on his behalf.    Ethics will continue to follow as more complex decision points occur throughout patient’s hospital course.    Thank you for this challenging case. Please do not hesitate to call us if there are any questions.   	    DISCUSSED WITH MEDICAL ETHICS ATTENDINGS: EILEEN DELVALLE MD & RADHA EDWARDS DNP (DIRECTOR OF MEDICAL ETHICS)  More than 50% of the time of this consultation was spent in coordination of Care of Patient					      References: 		    Jana LLANES. The Family Health Care Decisions Act.  [facility] Health Law Journal,2010;15:32-35.  2[place] Pub. Health Law § 2994-d.1.  Recommendation: According to the 2010 [facility] Family Health Care Decision Act, Pascual Sands, the patient’s roommate and friend, is the appropriate surrogate decision maker for Mr. Mcmahon, as patient continues to lack capacity. Ethics recommends optimization of patient’s capacity to ascertain if patient has any family members and/or a different person he would like to make decisions for him on his behalf.    Ethics will continue to follow as more complex decision points occur throughout patient’s hospital course.    Thank you for this challenging case. Please do not hesitate to call us if there are any questions.   	    DISCUSSED WITH MEDICAL ETHICS ATTENDINGS: EILEEN DELVALLE MD & RADHA EDWARDS DNP (DIRECTOR OF MEDICAL ETHICS)  More than 50% of the time of this consultation was spent in coordination of Care of Patient					      References: 		    Jana LLANES. The Family Health Care Decisions Act.  [facility] Health Law Journal,2010;15:32-35.  2[place] Pub[place] Health Law § 2994-d.1.

## 2022-03-01 ENCOUNTER — APPOINTMENT (OUTPATIENT)
Dept: CARDIOTHORACIC SURGERY | Facility: CLINIC | Age: 72
End: 2022-03-01

## 2022-03-01 LAB
ANION GAP SERPL CALC-SCNC: 14 MMOL/L — SIGNIFICANT CHANGE UP (ref 5–17)
BUN SERPL-MCNC: 28 MG/DL — HIGH (ref 7–23)
CALCIUM SERPL-MCNC: 9.1 MG/DL — SIGNIFICANT CHANGE UP (ref 8.4–10.5)
CHLORIDE SERPL-SCNC: 117 MMOL/L — HIGH (ref 96–108)
CO2 SERPL-SCNC: 20 MMOL/L — LOW (ref 22–31)
CREAT SERPL-MCNC: 0.79 MG/DL — SIGNIFICANT CHANGE UP (ref 0.5–1.3)
EGFR: 96 ML/MIN/1.73M2 — SIGNIFICANT CHANGE UP
GLUCOSE SERPL-MCNC: 98 MG/DL — SIGNIFICANT CHANGE UP (ref 70–99)
HCT VFR BLD CALC: 40.7 % — SIGNIFICANT CHANGE UP (ref 39–50)
HGB BLD-MCNC: 12.4 G/DL — LOW (ref 13–17)
MAGNESIUM SERPL-MCNC: 2.4 MG/DL — SIGNIFICANT CHANGE UP (ref 1.6–2.6)
MCHC RBC-ENTMCNC: 30 PG — SIGNIFICANT CHANGE UP (ref 27–34)
MCHC RBC-ENTMCNC: 30.5 GM/DL — LOW (ref 32–36)
MCV RBC AUTO: 98.5 FL — SIGNIFICANT CHANGE UP (ref 80–100)
NRBC # BLD: 0 /100 WBCS — SIGNIFICANT CHANGE UP (ref 0–0)
PHOSPHATE SERPL-MCNC: 3.2 MG/DL — SIGNIFICANT CHANGE UP (ref 2.5–4.5)
PLATELET # BLD AUTO: 232 K/UL — SIGNIFICANT CHANGE UP (ref 150–400)
POTASSIUM SERPL-MCNC: SIGNIFICANT CHANGE UP MMOL/L (ref 3.5–5.3)
POTASSIUM SERPL-SCNC: SIGNIFICANT CHANGE UP MMOL/L (ref 3.5–5.3)
RBC # BLD: 4.13 M/UL — LOW (ref 4.2–5.8)
RBC # FLD: 14.7 % — HIGH (ref 10.3–14.5)
SODIUM SERPL-SCNC: 151 MMOL/L — HIGH (ref 135–145)
WBC # BLD: 6.22 K/UL — SIGNIFICANT CHANGE UP (ref 3.8–10.5)
WBC # FLD AUTO: 6.22 K/UL — SIGNIFICANT CHANGE UP (ref 3.8–10.5)

## 2022-03-01 PROCEDURE — 99232 SBSQ HOSP IP/OBS MODERATE 35: CPT

## 2022-03-01 PROCEDURE — 99233 SBSQ HOSP IP/OBS HIGH 50: CPT

## 2022-03-01 RX ADMIN — SENNA PLUS 2 TABLET(S): 8.6 TABLET ORAL at 22:28

## 2022-03-01 RX ADMIN — HEPARIN SODIUM 5000 UNIT(S): 5000 INJECTION INTRAVENOUS; SUBCUTANEOUS at 06:00

## 2022-03-01 RX ADMIN — Medication 81 MILLIGRAM(S): at 15:11

## 2022-03-01 RX ADMIN — PANTOPRAZOLE SODIUM 40 MILLIGRAM(S): 20 TABLET, DELAYED RELEASE ORAL at 07:15

## 2022-03-01 RX ADMIN — ATORVASTATIN CALCIUM 80 MILLIGRAM(S): 80 TABLET, FILM COATED ORAL at 22:28

## 2022-03-01 RX ADMIN — HEPARIN SODIUM 5000 UNIT(S): 5000 INJECTION INTRAVENOUS; SUBCUTANEOUS at 17:38

## 2022-03-01 RX ADMIN — Medication 25 MILLIGRAM(S): at 06:00

## 2022-03-01 NOTE — OCCUPATIONAL THERAPY INITIAL EVALUATION ADULT - GENERAL OBSERVATIONS, REHAB EVAL
Pt's RN Gabbi cleared pt for therapy. MRS 4. Pt received semisupine in bed, +tele, +heplock, +evans, +bed alarm, room air, NAD, agreeable to therapy. Pt follows ~25% one step directions,  utilized, pt able to follow basic commands in English and Dominican ~25%.

## 2022-03-01 NOTE — OCCUPATIONAL THERAPY INITIAL EVALUATION ADULT - DIAGNOSIS, OT EVAL
Pt presents with decreased command following, decreased LUE/LLE strength, decreased balance, and decreased activity tolerance impacting his ability to independently complete ADLs and functional transfers. Pt presents with decreased command following, decreased LUE/LLE strength, slight L inattention, L facial droop, decreased balance, and decreased activity tolerance impacting his ability to independently complete ADLs and functional transfers.

## 2022-03-01 NOTE — OCCUPATIONAL THERAPY INITIAL EVALUATION ADULT - MD ORDER
This is a 71 year old Divehi speaking male, smoker (very recently quit, smoked x 40 years) with PMHx of CHF (EF 25-30%) and prediabetes (A1c 5.9) who is now s/p robotic MIDCAB with Dr. Santos on 02/09/2022.  Post OR he was extubated on POD 0, started on beta blockers by POD 2 and transferred to telemetry.  By POD 4 he was ambulating independently, on room air, having normal BM's, tolerating PO diet with surgical pain under control. He was discharged home on POD 4.

## 2022-03-01 NOTE — OCCUPATIONAL THERAPY INITIAL EVALUATION ADULT - PHYSICAL ASSIST/NONPHYSICAL ASSIST:DRESS LOWER BODY, OT EVAL
to lance/doff socks seated EOB, pt with decreased coordination noted on LUE when donning/doffing/verbal cues/nonverbal cues (demo/gestures)/1 person assist

## 2022-03-01 NOTE — PROGRESS NOTE ADULT - ASSESSMENT
71y Maori-speaking M, smoker, Hx of CHF, PreDM, s/p robotic MIDCAB in early February, presenting now after found down with new infarcts. GI consulted for abnormal imaging.    Abnormal imaging - Patient with CT A/P on 2/8 during prior hospitalization demonstrating possible antral thickening/gastric mass. Patient now returning with CVA. GI consulted for imaging findings as patient easily lost to follow-up.   - Patient would benefit from EGD +/- EUS of CT findings pending medical optimization  - Appreciate Palliative and Ethics recommendations regarding assignment of HCA for consent purposes - surrogate decision maker would be [place] at this time, friend/roommate  - Will need Neurologic evaluation/optimization and recommendations prior to any endoscopic procedure  - Needs optimization of electrolytes prior to endoscopic evaluation

## 2022-03-01 NOTE — OCCUPATIONAL THERAPY INITIAL EVALUATION ADULT - PHYSICAL ASSIST/NONPHYSICAL ASSIST:TOILET, OT EVAL
to perform hygiene in standing after having a bowel movement/verbal cues/nonverbal cues (demo/gestures)/1 person assist

## 2022-03-01 NOTE — PROGRESS NOTE ADULT - ATTENDING COMMENTS
The patient is a 71-year-old Polish/English-speaking male with a history of recent tobacco dependence, CHF with reduced ejection fraction (20%), prediabetes, and CAD status post robotic MIDCAB 2//9/2022.  He was discharged neurologically intact.  He presented 2/20/2022 with altered mental status with work-up revealing multifocal, bilateral acute and subacute infarcts by report of unclear mechanism though occult a fib, aortic athero, and malignancy driving hypercoagulable state are possibilities.    Outside images in transit. Repeat HCT shows several R-sided subcortical subacute strokes. Plan to repeat TTE (with bubble) and JOEY along with EGD/EUS for gastric mass per GI hopefully today.  Will f/u urinary cytology per urology given concern for bladder cancer.  For now, will continue aspirin alone given potential need for biopsy; will consider IV heparin pending review of images given concern for embolic process and/or hypercoagulable state related to malignancy. Continue statin.     Monitor Sodium; will replace NGT for FWF if needed. The patient is a 71-year-old Czech/English-speaking male with a history of recent tobacco dependence, CHF with reduced ejection fraction (20%), prediabetes, and CAD status post robotic MIDCAB 2//9/2022.  He was discharged neurologically intact.  He presented 2/20/2022 with altered mental status with work-up revealing multifocal, bilateral acute and subacute infarcts by report of unclear mechanism though occult a fib, aortic athero, and malignancy driving hypercoagulable state are possibilities.    Outside images in transit. Repeat HCT shows several R-sided subcortical subacute strokes. Plan to repeat TTE (with bubble) and JOEY along with EGD/EUS for gastric mass per GI hopefully today.  Will f/u urinary cytology per urology given concern for bladder cancer.  For now, will continue aspirin alone given potential need for biopsy; will consider IV heparin pending review of images given concern for embolic process and/or hypercoagulable state related to malignancy. Continue statin. Monitor Sodium; will replace NGT for FWF if needed.  Appreciate pall care/ethics input. For now, patient's roommate will act as surrogate decision maker.

## 2022-03-01 NOTE — PROGRESS NOTE ADULT - SUBJECTIVE AND OBJECTIVE BOX
Interval HPI: Patient pulled his NG tube. Was on free water for hypernatremia. Ethics following      PAST MEDICAL & SURGICAL HISTORY:  Acute on chronic combined systolic and diastolic congestive heart failure  Prediabetes  Smoking hx    Home Medications:  Metoprolol 25 mg PO BID  Lipitor 80 mg Qd  ASA    MEDICATIONS  (STANDING):  aspirin  chewable 81 milliGRAM(s) Oral daily  atorvastatin 80 milliGRAM(s) Oral at bedtime  heparin   Injectable 5000 Unit(s) SubCutaneous every 12 hours  metoprolol succinate ER 25 milliGRAM(s) Oral daily  pantoprazole    Tablet 40 milliGRAM(s) Oral before breakfast  senna 2 Tablet(s) Oral at bedtime    MEDICATIONS  (PRN):  polyethylene glycol 3350 17 Gram(s) Oral daily PRN Constipation    ICU Vital Signs Last 24 Hrs  T(C): 36.6 (01 Mar 2022 17:31), Max: 36.9 (28 Feb 2022 22:07)  T(F): 97.8 (01 Mar 2022 17:31), Max: 98.4 (28 Feb 2022 22:07)  HR: 104 (01 Mar 2022 16:30) (94 - 114)  BP: 134/83 (01 Mar 2022 16:30) (134/80 - 159/80)  BP(mean): 103 (01 Mar 2022 16:30) (101 - 111)  ABP: --  ABP(mean): --  RR: 18 (01 Mar 2022 16:30) (16 - 20)  SpO2: 94% (01 Mar 2022 16:30) (92% - 100%)      PHYSICAL EXAM:     Constitutional: WDWN resting comfortably in bed;   Head: NC/AT  ENT: MMM  Neck: supple; no JVD   Respiratory: CTA B/L; no W/R/R,  Cardiac: +S1/S2; RRR; no M/R/G;   Gastrointestinal: soft, NT/ND; no rebound or guarding; +BS  Extremities: WWP, no clubbing or cyanosis; no peripheral edema  Vascular: 2+ radial, DP/PT pulses B/L    .  LABS:                         12.4   6.22  )-----------( 232      ( 01 Mar 2022 06:48 )             40.7     03-01    151<H>  |  117<H>  |  28<H>  ----------------------------<  98  see note   |  20<L>  |  0.79    Ca    9.1      01 Mar 2022 06:48  Phos  3.2     03-01  Mg     2.4     03-01                    RADIOLOGY, EKG & ADDITIONAL TESTS: Reviewed.         RADIOLOGY, EKG & ADDITIONAL TESTS: Reviewed.       < from: TTE Echo Complete w/ Contrast w/ Doppler (02.09.22 @ 09:08) >     1. Mild symmetric left ventricular hypertrophy.   2. Left ventricularsystolic function is severely reduced with a   calculated ejection fraction of 20-25% with global hypokinesis.   3. The inferolateral wall, the anterolateral wall and apical segments   appear more hypokinetic. No LV thrombus seen.   4. Grade III leftventricular diastolic dysfunction.   5. Normal right ventricular size and systolic function.   6. Aortic sclerosis without significant stenosis.   7. Mild aortic regurgitation.   8. Mild mitral regurgitation.   9. Pulmonary hypertension present, pulmonary artery systolic pressure is   42 mmHg.  10. Small pericardial effusion without echocardiographic evidence of   cardiac tamponade physiology.  11. No prior echo is available for comparison.    < end of copied text >  < from: TTE Echo Complete w/ Contrast w/ Doppler (02.09.22 @ 09:08) >  LVIDd (2D):     5.40 cm  (4.2-5.8)     (3.8-5.2)    < end of copied text >  < from: TTE Echo Complete w/ Contrast w/ Doppler (02.09.22 @ 09:08) >  Left ventricular systolic function is severely reduced with a calculated   ejection fraction of 20-25% with global hypokinesis. The inferolateral   wall, the anterolateral wall and apical segments appear more hypokinetic.   No LV thrombus seen. Analysis of mitral annular tissue Doppler, left   atrial volume index, and tricuspid regurgitantvelocity reveals: grade   III diastolic dysfunction with elevated filling pressure.    < end of copied text >      < from: TTE Echo Complete w/ Contrast w/ Doppler (02.24.22 @ 09:21) >  CONCLUSIONS:     1. Aortic sclerosis without significant stenosis.   2. Mild aortic regurgitation.   3.Mild pulmonic regurgitation.   4. No other significant valvular disease.   5. Normal right ventricular size and systolic function.   6. The left ventricle cavity size is normal. There is mild concentric   left ventricular hypertrophy. Abnormal septalmotion seen due to abnormal   conduction. Left ventricular systolic function is severely reduced with a   calculated ejection fraction of 20% with regional wall motion   abnormalities.   7. Trivial pericardial effusion without echocardiographic evidence of   cardiac tamponade physiology.   8. Bilateral pleural effusion.   9. Compared to the previous TTE performed on 2/9/2022, pericardial   effusion appears to have decreased however image quality was better on   prior study.    < end of copied text >  < from: TTE Echo Complete w/ Contrast w/ Doppler (02.24.22 @ 09:21) >  eft Ventricle:  The left ventricle cavity size is normal. There is mild concentric left   ventricular hypertrophy. Abnormal septal motion seen due to abnormal   conduction. Left ventricular systolic function is severely reduced with a   calculated ejection fraction of 20% with regional wall motion   abnormalities. Apical segments appear akinetic. Remaining segments are   severely hypokinetic. Analysis of left ventricular diastolic function and   filling pressure is made challenging by the presence of suboptimal mitral   inflow doppler.    < end of copied text >    < from: CT Abdomen and Pelvis No Cont (02.08.22 @ 18:55) >   A dominant dense lesion within the left renal   cortex measures up to 2.1 cm.    < end of copied text >  < from: CT Abdomen and Pelvis No Cont (02.08.22 @ 18:55) >  Mucosal thickening of the stomach, an irregular   mucosal thickening of the distal stomach not excluding gastritis or an   underlying mass. No    < end of copied text >  < from: CT Abdomen and Pelvis No Cont (02.08.22 @ 18:55) >  moderate aorta iliac atherosclerosis.    < end of copied text >  < from: CT Abdomen and Pelvis No Cont (02.08.22 @ 18:55) >  illing defect or mass within the posterior right aspect of the urinary   bladder measures up to 1.8 x 1.0 x 1.7 cm.    < end of copied text >  < from: CT Abdomen and Pelvis No Cont (02.08.22 @ 18:55) >   A small to moderate right pleural effusionright basilar airspace   disease, COPD type changes.  2. Mucosal thickening of the stomach, an irregular mucosal thickening of   the distal stomach not excluding gastritis or an underlying mass.  3. A residual contrast within the urinary bladder, a filling defect or   mass within the posterior right aspect of the urinary bladder measures up   to 1.8 x 1.0 x 1.7 cm.  4. A dominant dense lesion within the left renal cortex measures up to   2.1 cm. Recommend ultrasound correlation.    < end of copied text >

## 2022-03-01 NOTE — OCCUPATIONAL THERAPY INITIAL EVALUATION ADULT - IMPAIRED TRANSFERS: SIT/STAND, REHAB EVAL
impaired coordination/decreased strength ataxic/impaired coordination/impaired motor control/decreased strength

## 2022-03-01 NOTE — PROGRESS NOTE ADULT - ASSESSMENT
71M Ukrainian speaking smoker (very recently quit, smoked x 40 years) with PMHx of CHF (EF 25-30%) and prediabetes (A1c 5.9) who is now s/p robotic MIDCAB with Dr. Santos on 02/09/2022, ambulating well and discharged on POD 4, returned to OSH on 02/20/2022 with AMS, MRI showed bilateral embolic appearing strokes. Pt had ECHO, EEG and cerebral angio done at OSH as well. Patient was transferred to [facility] for further MGMT and work up, initially to CT surg but then tx to stroke service given CVA as primary reason for admission. He is pending TTE and JOEY. Course has been complicated by hypernatremia as well as known bladder and gastric masses, pending EGD +/- EUS.     Neuro     #CVA  - continue aspirin 81mg daily   - holding plavix while pending EGD with GI  - continue atorvastatin 80mg daily   - q4hr stroke neuro checks and vitals   - TTE with bubble pending  - JOEY pending, will be completed on same day as EGD.   - b/l LE US 2/26 - negative for DVT    Cardiology    #HTN   - Goal SBP <180  - continue metoprolol succinate ER 25mg QD      #Heart failure   EF 20%   - Avoid fluid overload    - I/Os    - CT surgery consulted, appreciate recommendations    #HLD   - high dose statin as above in CVA   - LDL results: 90     Pulm   No active issues  - call provider if SPO2 < 94%     GI     # Stomach mass   - EGD +/- EUS  - GI wants hypernatremia to improve prior to study   - GI following, appreciate recs    #Nutrition/Fluids/Electrolytes    - replete K<4 and Mg <2   - Diet: DASH/TLC - pending NGT placement for free water boluses.   - IVF: NS at 50cc/hr      Renal  - Daily BMP  - US Retroperitoneal Complete ordered - renal lesion seen on previous CT A/P    #bladder mass  - urine cytology ordered  - maintain evans, will be discharged with as per   -  following, appreciate recs    Hematology  #hypernatremia ?chronic  - will place NGT today  - free water boluses  - daily BMP    Infectious Disease   No active issue      Endocrine   A1c 5.4  TSH 7.64     DVT Prophylaxis   - heparin x sq  - SCDs for DVT prophylaxis      IDR Goals: Goals reviewed at interdisciplinary rounds with case management, social work, physical therapy, occupational therapy, and speech language pathology.   Please see specific therapy  notes for in depth goals.  Dispo: pending PT/OT eval     Discussed daily hospital plans and goals with patient and have updated patient's roommate. Palliative and ethics are being consulted given patient's lack of family and patient unable to make healthcare decisions for self at this time.     Discussed with Neurology Attending Dr. Nazia Joiner     71M Romanian speaking smoker (very recently quit, smoked x 40 years) with PMHx of CHF (EF 25-30%) and prediabetes (A1c 5.9) who is now s/p robotic MIDCAB with Dr. Santos on 02/09/2022, ambulating well and discharged on POD 4, returned to OSH on 02/20/2022 with AMS, MRI showed bilateral embolic appearing strokes. Pt had ECHO, EEG and cerebral angio done at OSH as well. Patient was transferred to [facility] for further MGMT and work up, initially to CT surg but then tx to stroke service given CVA as primary reason for admission. He is pending TTE and JOEY. Course has been complicated by hypernatremia as well as known bladder and gastric masses, pending EGD +/- EUS.     Neuro     #CVA  - continue aspirin 81mg daily   - holding plavix while pending EGD with GI  - continue atorvastatin 80mg daily   - q4hr stroke neuro checks and vitals   - TTE with bubble pending  - JOEY pending, will be completed on same day as EGD.   - b/l LE US 2/26 - negative for DVT    Cardiology    #HTN   - Goal SBP <180  - continue metoprolol succinate ER 25mg QD      #Heart failure   EF 20%   - Avoid fluid overload    - I/Os    - CT surgery consulted, appreciate recommendations    #HLD   - high dose statin as above in CVA   - LDL results: 90     Pulm   No active issues  - call provider if SPO2 < 94%     GI     # Stomach mass   - EGD +/- EUS  - GI wants hypernatremia to improve prior to study   - GI following, appreciate recs    #Nutrition/Fluids/Electrolytes   Hypernatremia is better today    - replete K<4 and Mg <2   - Diet: DASH/TLC - pending NGT placement for free water boluses.   - IVF: None   - Continue to encourage oral water intake       Renal  - Daily BMP  - US Retroperitoneal Complete ordered - renal lesion seen on previous CT A/P    #bladder mass  - urine cytology ordered  - maintain evans, will be discharged with as per   -  following, appreciate recs    Hematology  #hypernatremia ?chronic  - will place NGT today  - free water boluses  - daily BMP    Infectious Disease   No active issue      Endocrine   A1c 5.4  TSH 7.64     DVT Prophylaxis   - heparin x sq  - SCDs for DVT prophylaxis      IDR Goals: Goals reviewed at interdisciplinary rounds with case management, social work, physical therapy, occupational therapy, and speech language pathology.   Please see specific therapy  notes for in depth goals.  Dispo: pending PT/OT eval     Discussed daily hospital plans and goals with patient and have updated patient's roommate. Palliative and ethics are being consulted given patient's lack of family and patient unable to make healthcare decisions for self at this time.     Discussed with Neurology Attending Dr. Nazia Joiner

## 2022-03-01 NOTE — OCCUPATIONAL THERAPY INITIAL EVALUATION ADULT - VISUAL ASSESSMENT: TRACKING
pt able to grossly track, however pt with difficulty consistently tracking therapist's finger, unsure if this is 2/2 decreased command following

## 2022-03-01 NOTE — CONSULT NOTE ADULT - ASSESSMENT
per Neurology    70 yo M [place] Islander speaking smoker (very recently quit, smoked x 40 years) with PMHx of CHF (EF 25-30%) and prediabetes (A1c 5.9) who is now s/p robotic MIDCAB with Dr. Santos on 02/09/2022, ambulating well and discharged on POD 4, returned to OSH on 02/20/2022 with AMS, MRI showed bilateral embolic appearing strokes. Pt had ECHO, EEG and cerebral angio done at OSH as well. Patient was transferred to [facility] for further MGMT and work up, initially to CT surg but then tx to stroke service given CVA as primary reason for admission. He is pending TTE and JOEY. Course has been complicated by hypernatremia as well as known bladder and gastric masses, pending EGD +/- EUS.     Neuro     #CVA  - continue aspirin 81mg daily   - holding plavix while pending EGD with GI  - continue atorvastatin 80mg daily   - q4hr stroke neuro checks and vitals   - TTE with bubble pending  - JOEY pending, will be completed on same day as EGD.   - b/l LE US 2/26 - negative for DVT    Cardiology    #HTN   - Goal SBP <180  - continue metoprolol succinate ER 25mg QD      #Heart failure   EF 20%   - Avoid fluid overload    - I/Os    - CT surgery consulted, appreciate recommendations    #HLD   - high dose statin as above in CVA   - LDL results: 90     Pulm   No active issues  - call provider if SPO2 < 94%     GI     # Stomach mass   - EGD +/- EUS  - GI wants hypernatremia to improve prior to study   - GI following, appreciate recs    #Nutrition/Fluids/Electrolytes    - replete K<4 and Mg <2   - Diet: DASH/TLC - pending NGT placement for free water boluses.   - IVF: NS at 50cc/hr      Renal  - Daily BMP  - US Retroperitoneal Complete ordered - renal lesion seen on previous CT A/P    #bladder mass  - urine cytology ordered  - maintain evans, will be discharged with as per   -  following, appreciate recs    Hematology  #hypernatremia ?chronic  - will place NGT today  - free water boluses  - daily BMP    Infectious Disease   No active issue      Endocrine   A1c 5.4  TSH 7.64     DVT Prophylaxis   - heparin x sq  - SCDs for DVT prophylaxis

## 2022-03-01 NOTE — OCCUPATIONAL THERAPY INITIAL EVALUATION ADULT - ADDITIONAL COMMENTS
2/2 decreased cognition, unable to obtain home setup and PLOF from pt. As per chart, pt lives with a roommate in an apartment. After recent admission for MIDCAB on 2/9/22 at d/c pt was independent in all ADLs and IADLs, and ambulates with no device.

## 2022-03-01 NOTE — PROGRESS NOTE ADULT - ASSESSMENT
71M Mongolian speaker, HFrEF (25-30%), Pre-DM (5.9), s/p robotic MidCab 2/9/22 at [facility], recently dc who presented to OSH 2/20 w AMS found to have acute MCA CVA, b/l acute infarctions largest in R basal ganglia and insular region - transferred to [facility] for further management - found also to have hypernatremia, GI ulceration for EGD/EUS 2/28, and Bladder mass c/f malignancy    #Multiple strokes - suspect embolic. MCA CVA, B/L acute infarctions in R basal ganglia and insular region  - TSH elevated at last admission. FT4 wnl   - ASA on hold for EGD   - Atorvastatin through NGT       #HFrEF - Appears Volume depleted  EF 20% on TTE. Currently on Lasix 10 IV, Lopressor 12.5 BID through NGT     #CAD - no chest pain.  CCTA 2/7 - severe calcified pLAD to distal L Main.     #Hypernatremia   - Walsh in place and appears dry. Likely 2/2 decreased free water intake.   - After receiving free water through NGT on 2/28 his sodium is improving , he removed the NGT, He will need replacement of the NGT and recommend 300ml Free water through NGT Q4hrs for next 24 hours , recheck sodium in am     #GI ulceration vs antral mass - plan for EGD/EUS w GI on Na improves and Neurological status Improves     #Bladder mass - Urology following and recommended MRI vs CT renal protocol. UA w cytology for possible malignancy. Recommended for outpatient cystoscopy   - Walsh in place    # Ethical Issue - Lack of HCP , Ethics team recommended Discussing goals of care with Patients friend " Soon "

## 2022-03-01 NOTE — PROGRESS NOTE ADULT - SUBJECTIVE AND OBJECTIVE BOX
Patient discussed on morning rounds with       Operation / Date:     SUBJECTIVE ASSESSMENT:  71y Male         Vital Signs Last 24 Hrs  T(C): 36.8 (01 Mar 2022 07:00), Max: 36.9 (28 Feb 2022 22:07)  T(F): 98.3 (01 Mar 2022 07:00), Max: 98.4 (28 Feb 2022 22:07)  HR: 94 (01 Mar 2022 05:27) (92 - 104)  BP: 134/82 (01 Mar 2022 05:27) (134/82 - 145/88)  BP(mean): 102 (01 Mar 2022 05:27) (102 - 112)  RR: 20 (01 Mar 2022 05:27) (18 - 20)  SpO2: 100% (01 Mar 2022 05:27) (97% - 100%)  I&O's Detail    28 Feb 2022 07:01  -  01 Mar 2022 07:00  --------------------------------------------------------  IN:    Free Water: 200 mL  Total IN: 200 mL    OUT:    Indwelling Catheter - Urethral (mL): 500 mL    Voided (mL): 400 mL  Total OUT: 900 mL    Total NET: -700 mL          CHEST TUBE:  Yes/No. AIR LEAKS: Yes/No. Suction / H2O SEAL.   KERMIT DRAIN:  Yes/No.  EPICARDIAL WIRES: Yes/No.  TIE DOWNS: Yes/No.  AGUILAR: Yes/No.    PHYSICAL EXAM:    General:     Neurological:    Cardiovascular:    Respiratory:    Gastrointestinal:    Extremities:    Vascular:    Incision Sites:    LABS:                        12.4   6.22  )-----------( 232      ( 01 Mar 2022 06:48 )             40.7       COUMADIN:  No      03-01    151<H>  |  117<H>  |  28<H>  ----------------------------<  98  see note   |  20<L>  |  0.79    Ca    9.1      01 Mar 2022 06:48  Phos  3.2     03-01  Mg     2.4     03-01        MEDICATIONS  (STANDING):  aspirin  chewable 81 milliGRAM(s) Oral daily  atorvastatin 80 milliGRAM(s) Oral at bedtime  heparin   Injectable 5000 Unit(s) SubCutaneous every 12 hours  metoprolol succinate ER 25 milliGRAM(s) Oral daily  pantoprazole    Tablet 40 milliGRAM(s) Oral before breakfast  senna 2 Tablet(s) Oral at bedtime    MEDICATIONS  (PRN):  polyethylene glycol 3350 17 Gram(s) Oral daily PRN Constipation        RADIOLOGY & ADDITIONAL TESTS:    < from: Xray Chest 1 View- PORTABLE-Urgent (Xray Chest 1 View- PORTABLE-Urgent .) (02.28.22 @ 13:11) >    FINDINGS:    Single frontal view of the chest demonstrates the lungs to be clear. The   cardiomediastinal silhouette is enlarged. NG tube tip courses below the   hemidiaphragm. No acute osseous abnormalities. Overlying EKG leads and   wires are noted    IMPRESSION: NG tube tip courses below the hemidiaphragm.    < end of copied text >   Operation / Date: 2/9/22 uncomplicated robotic assisted MIDCAB    SUBJECTIVE ASSESSMENT:  71y Male seen and examined. Patient lethargic and not able to answer questions. Per bedside staff, he had an NGT placed yesterday and become agitated overnight and pulled it out.      Vital Signs Last 24 Hrs  T(C): 36.8 (01 Mar 2022 07:00), Max: 36.9 (28 Feb 2022 22:07)  T(F): 98.3 (01 Mar 2022 07:00), Max: 98.4 (28 Feb 2022 22:07)  HR: 94 (01 Mar 2022 05:27) (92 - 104)  BP: 134/82 (01 Mar 2022 05:27) (134/82 - 145/88)  BP(mean): 102 (01 Mar 2022 05:27) (102 - 112)  RR: 20 (01 Mar 2022 05:27) (18 - 20)  SpO2: 100% (01 Mar 2022 05:27) (97% - 100%)  I&O's Detail    28 Feb 2022 07:01  -  01 Mar 2022 07:00  --------------------------------------------------------  IN:    Free Water: 200 mL  Total IN: 200 mL    OUT:    Indwelling Catheter - Urethral (mL): 500 mL    Voided (mL): 400 mL  Total OUT: 900 mL    Total NET: -700 mL      CHEST TUBE: no  KERMIT DRAIN:  No.  EPICARDIAL WIRES: No.  TIE DOWNS: No.  AGUILAR: Yes.    PHYSICAL EXAM:    General: NAD, sitting comfortably in bed, aphasic  Neurological: not alert or oriented, RUE and RLE 4/5 strength, LUE and LLE 3/5 strength  Cardiovascular: RRR, Clear S1 and S2, no murmurs appreciated  Respiratory: chest expansion symmetrical, CTA b/l, no wheezing noted  Gastrointestinal: +BS, soft, NT, ND  Extremities: moving spontaneously, no calf tenderness or edema.  Vascular: warm, well perfused. DP/PT pulses palpable b/l.  Incisions: L thoracotomy healing well. C/D/I, no drainage or purulence.      LABS:                        12.4   6.22  )-----------( 232      ( 01 Mar 2022 06:48 )             40.7       COUMADIN:  No      03-01    151<H>  |  117<H>  |  28<H>  ----------------------------<  98  see note   |  20<L>  |  0.79    Ca    9.1      01 Mar 2022 06:48  Phos  3.2     03-01  Mg     2.4     03-01        MEDICATIONS  (STANDING):  aspirin  chewable 81 milliGRAM(s) Oral daily  atorvastatin 80 milliGRAM(s) Oral at bedtime  heparin   Injectable 5000 Unit(s) SubCutaneous every 12 hours  metoprolol succinate ER 25 milliGRAM(s) Oral daily  pantoprazole    Tablet 40 milliGRAM(s) Oral before breakfast  senna 2 Tablet(s) Oral at bedtime    MEDICATIONS  (PRN):  polyethylene glycol 3350 17 Gram(s) Oral daily PRN Constipation      RADIOLOGY & ADDITIONAL TESTS:    < from: Xray Chest 1 View- PORTABLE-Urgent (Xray Chest 1 View- PORTABLE-Urgent .) (02.28.22 @ 13:11) >    FINDINGS:    Single frontal view of the chest demonstrates the lungs to be clear. The   cardiomediastinal silhouette is enlarged. NG tube tip courses below the   hemidiaphragm. No acute osseous abnormalities. Overlying EKG leads and   wires are noted    IMPRESSION: NG tube tip courses below the hemidiaphragm.    < end of copied text >

## 2022-03-01 NOTE — PROGRESS NOTE ADULT - SUBJECTIVE AND OBJECTIVE BOX
Patient is a 71y old  Male who presents with a chief complaint of CVA (28 Feb 2022 13:22)        SUBJECTIVE:  Patient was seen and examined at bedside. History was obtained by using , lethargic this morning , opening his eyes to sound but not following simple instructions     removed his NGT last night     Review of systems: unable to evaluate appropriately as he is not answering questions     Diet, DASH/TLC:   Sodium & Cholesterol Restricted  Soft and Bite Sized (SOFTBTSZ)  Mildly Thick Liquids (MILDTHICKLIQS)  Supplement Feeding Modality:  Oral  Ensure Enlive Cans or Servings Per Day:  1       Frequency:  Two Times a day (02-25-22 @ 16:42) [Active]      MEDICATIONS:  MEDICATIONS  (STANDING):  aspirin  chewable 81 milliGRAM(s) Oral daily  atorvastatin 80 milliGRAM(s) Oral at bedtime  heparin   Injectable 5000 Unit(s) SubCutaneous every 12 hours  metoprolol succinate ER 25 milliGRAM(s) Oral daily  pantoprazole    Tablet 40 milliGRAM(s) Oral before breakfast  senna 2 Tablet(s) Oral at bedtime    MEDICATIONS  (PRN):  polyethylene glycol 3350 17 Gram(s) Oral daily PRN Constipation      Allergies    No Known Allergies    Intolerances        OBJECTIVE:  Vital Signs Last 24 Hrs  T(C): 36.8 (01 Mar 2022 10:06), Max: 36.9 (28 Feb 2022 22:07)  T(F): 98.2 (01 Mar 2022 10:06), Max: 98.4 (28 Feb 2022 22:07)  HR: 104 (01 Mar 2022 11:45) (92 - 104)  BP: 144/85 (01 Mar 2022 11:45) (134/80 - 144/85)  BP(mean): 101 (01 Mar 2022 09:10) (101 - 111)  RR: 16 (01 Mar 2022 11:45) (16 - 20)  SpO2: 98% (01 Mar 2022 11:45) (97% - 100%)      PHYSICAL EXAM:  Gen: Reclining in bed at time of exam, not in distress   HEENT: moist mucosa, no lesions ,   Neck: supple, trachea at midline  CV: RRR, +S1/S2  Pulm: adequate respiratory effort, no increase in work of breathing  Abd: soft, NTND  Skin: warm and dry, no new rashes vs prior report  Ext: WWP, no LE edema  Neuro: awake, does not follow commands   Psych: unable to evaluate     LABS:                                      12.4   6.22  )-----------( 232      ( 01 Mar 2022 06:48 )             40.7     03-01    151<H>  |  117<H>  |  28<H>  ----------------------------<  98  see note   |  20<L>  |  0.79    Ca    9.1      01 Mar 2022 06:48  Phos  3.2     03-01  Mg     2.4     03-01

## 2022-03-01 NOTE — PROGRESS NOTE ADULT - ASSESSMENT
71 year old Guyanese speaking Male, smoker (with 40 Pack year History with PMHx of ICM (EF 20-25%) 2/2 CAD s/p MIDCAB (LIMA-LAD) on 2/22, Grade III DD, prediabetes (A1c 5.9), Bladder and Gastric mass not yet investigated, who presented to OSH with AMS and aphasia, found to have MRI evidence of scattered bilateral acute/subacute infarctions, largest in the right basal ganglia and insular region, as well as in bilateral frontal lobes, left basal ganglia, and right parietal lobe. He underwent cerebral angiogram done as well at OSH which showed moderate stenosis of b/l ICA's and b/l mild stenosis at ICA origins, and moderate to severe stenosis of left subclavian artery.     Echo this hospitalization revealed Severely reduced LVSF with EF of 20% with global hypokinesis, LVH     Pertinent Cardiac Studies:  CCTA ([facility] [place]) 02/07/22: Severe heavily calcified proximal LAD stenosis to distal L Main Artery.  TTE 02/09/22: EF 20-25%, Mild LVH, Grade III DD, Normal RV size and function, PASP: 42mmHG  TTE 02/25/22: EF 20%, Mild LVH, Normal RV size and Function. Unable to complete diastology assessment    1) Ischemic Cardiomyopathy(EF 20%) and dCHF  -Pt at ACC/AHA Stage C NYHA class II  -Cont with Toprol 25 mg Qd   -If lower BP goal tolerated post Stroke, and patient able to tolerate PO, maintain euvolemia, would initiate Entresto 24-26 mg PO BID  -Tele Reviewed with frequent PVCs and no definitive Afib. Would keep on tele to monitor for pAFib, as patient would then require AC    2) Atherosclerotic CVD  -Evidence of CAD (S/p LIMA-LAD) and Cerebrovascular disease (Multiple strokes)  -Cont with ASA 81 mg Daily and Lipitor 80 mg    3) CVA  -Given high risk of cardioembolic CVA with ischemic heart disease and recent cardiac surgery, would consider empiric anticoagulation though will defer to neurology team

## 2022-03-01 NOTE — OCCUPATIONAL THERAPY INITIAL EVALUATION ADULT - SENSORY TESTS
Limited cranial nerve assessment 2/2 decreased command following combined with language barrier despite use of . Limited cranial nerve assessment 2/2 decreased command following combined with language barrier despite use of . CN Testing: smile with L facial droop; tongue protrusion at midline w/ b/l lateralization in tact; b/l eyes open/close intact;

## 2022-03-01 NOTE — PROGRESS NOTE ADULT - ASSESSMENT
71 year old noncompliant Iranian speaking male, smoker (very recently quit, smoked x 40 years) with PMHx of CHF (EF 25-30%) and prediabetes (A1c 5.9) who is now s/p robotic MIDCAB with Dr. Santos on 02/09/2022.  Post OR he was extubated on POD 0, started on beta blockers by POD 2 and transferred to telemetry.  By POD 4 he was ambulating independently, on room air, having normal BM's, tolerating PO diet with surgical pain under control. He was discharged home on POD 4.  On 02/20/2022 patient presented to OSH with AMS. Work up included CT without contrast, CTA Head and Neck, and MRI Jerry showing acute MCA CVA with b/l acute inracts and the largest in the right basal ganglia and insular region. EEG negative for seizure. TTE = ? PFO. Patient transfer to [facility] for further management and workup. On 2/24 the stroke team was consulted for recent stroke findings. He failed a dysphagia screen, SLP came and approved soft, bite sized diet with mildly thick liquids. CT A/P completed on 2/8 showed bladder mass and ulceration to antrum of stomach (without drop in H/H) and GI and  were consulted. TTE completed w/definity and showed EF of 20%. Patient was transferred to neurology/stroke team service with CTSX following as a consult. Patient remains stable from a cardiac standpoint at this time.    Plan:  Problem 1: CAD s/p MIDCAB 2/9/22  - continue ASA  - continue metoprolol  - continue atorvastatin  - consider restarting lasix for signs or symptoms of fluid overload   - plan for JOEY and EGD simultaneously when hypernatremia is resolved  - Hemodynamically stable.   - Monitor: BP, HR, tele  - CTS will continue following    Problem 2: MCA CVA 2/20/22 at OSH  - continue aspirin 81mg daily   - holding plavix while pending EGD with GI  - continue atorvastatin 80mg daily   - q4hr stroke neuro checks and vitals   - TTE with bubble pending  - JOEY pending, will be completed on same day as EGD.   - b/l LE US 2/26 - negative for DVT  - aphasic  - SLP eval for dysphagia- cleared for small bites, mildly thick liquids  - EEG negative for seizure  - care per primary team    Problem 3: GI ulceration vs antral mass  - planned EGD/EUS with GI pending hypernatremia optimization  - appreciate GI recs  - NGT placed 2/28, patient pulled out overnight  - care per primary team    Problem 4: bladder mass  - urology following  -  recommends keeping evans on discharge  - MRI vs CT renal protocol pending  - retroperitoneal US completed   - UA with cytology for possible malignancy  - outpatient cystoscopy recommended  - care per primary team    CTS will continue to follow along

## 2022-03-01 NOTE — OCCUPATIONAL THERAPY INITIAL EVALUATION ADULT - PLANNED THERAPY INTERVENTIONS, OT EVAL
ADL retraining/IADL retraining/balance training/bed mobility training/cognitive, visual perceptual/fine motor coordination training/motor coordination training/neuromuscular re-education/prosthetic fitting/training/ROM/strengthening/transfer training

## 2022-03-01 NOTE — CONSULT NOTE ADULT - SUBJECTIVE AND OBJECTIVE BOX
Patient is a 71y old  Male who presents with a chief complaint of CVA (01 Mar 2022 09:13)       HPI:  This is a 71 year old [place] speaking male, smoker (very recently quit, smoked x 40 years) with PMHx of CHF (EF 25-30%) and prediabetes (A1c 5.9) who is now s/p robotic MIDCAB with Dr. Santos on 02/09/2022.  Post OR he was extubated on POD 0, started on beta blockers by POD 2 and transferred to telemetry.  By POD 4 he was ambulating independently, on room air, having normal BM's, tolerating PO diet with surgical pain under control. He was discharged home on POD 4.  On 02/20/2022 patient presented to OSH with AMS. Work up included CT without, CTA Head and Neck and MRI Jerry showing acute MCA CVA, b/l acute inracts  with the largest in the right basal ganglia and insular region. EEG negative for seizure. TTE = ? PFO. Patient transfer to [facility] for further MGMT and work up.      PMH/PSH:   See HPI.    SH:  40 years of 1ppd smoking, stopped 01/2022  Alcohol: 1 drink per night, stopped 01/2022  Elicit drugs: No  Occupation: ?  Home: Yes    FH:  N/A    Allergies:   See above.     Home Rx.:   See below. (23 Feb 2022 17:49)      PAST MEDICAL & SURGICAL HISTORY:  Acute on chronic combined systolic and diastolic congestive heart failure    Prediabetes    Smoking hx        MEDICATIONS  (STANDING):  aspirin  chewable 81 milliGRAM(s) Oral daily  atorvastatin 80 milliGRAM(s) Oral at bedtime  heparin   Injectable 5000 Unit(s) SubCutaneous every 12 hours  metoprolol succinate ER 25 milliGRAM(s) Oral daily  pantoprazole    Tablet 40 milliGRAM(s) Oral before breakfast  senna 2 Tablet(s) Oral at bedtime    MEDICATIONS  (PRN):  polyethylene glycol 3350 17 Gram(s) Oral daily PRN Constipation        Social History:                -  Home Living Status:  lives with roommates in an elevator accessible apartment building         -  Prior Home Care Services:   none         Baseline Functional Level Prior to Admission:             - ADL's/ IADL's:  independent         - ambulatory status PTA:  walked without assistive devices    FAMILY HISTORY:      CBC Full  -  ( 01 Mar 2022 06:48 )  WBC Count : 6.22 K/uL  RBC Count : 4.13 M/uL  Hemoglobin : 12.4 g/dL  Hematocrit : 40.7 %  Platelet Count - Automated : 232 K/uL  Mean Cell Volume : 98.5 fl  Mean Cell Hemoglobin : 30.0 pg  Mean Cell Hemoglobin Concentration : 30.5 gm/dL  Auto Neutrophil # : x  Auto Lymphocyte # : x  Auto Monocyte # : x  Auto Eosinophil # : x  Auto Basophil # : x  Auto Neutrophil % : x  Auto Lymphocyte % : x  Auto Monocyte % : x  Auto Eosinophil % : x  Auto Basophil % : x      03-01    151<H>  |  117<H>  |  28<H>  ----------------------------<  98  see note   |  20<L>  |  0.79    Ca    9.1      01 Mar 2022 06:48  Phos  3.2     03-01  Mg     2.4     03-01              Radiology:    < from: CT Head No Cont (02.26.22 @ 12:11) >    ACC: [id number] EXAM:  CT BRAIN                          PROCEDURE DATE:  02/26/2022          INTERPRETATION:  *******************OFFICIAL ATTENDING   REPORT*******************    CLINICAL INDICATION: Multiple strokes. Worsening clinical exam.    TECHNIQUE: CT of the head was performed without the administration of   intravenous contrast.    COMPARISON: None available.    FINDINGS:    There is focal low attenuation in the right frontal operculum, right   corona radiata, right basal ganglia, and right insular cortex, findings   consistent with age-indeterminate infarcts. There is no evidence of   associated hemorrhage.    There is hypoattenuation of the subcortical and periventricular white   matter, which is nonspecific finding, but most likely represents sequela   of chronic microvascular ischemic disease. There are atherosclerotic   calcifications of the cavernous and supraclinoid internal carotid   arteries bilaterally, and also the bilateral intradural vertebral   arteries. There is prominence of the cortical sulci related to underlying   brain parenchymal volume loss.    There is no evidence of intracranial mass or hydrocephalus. There are no   extra-axial fluid collections.    The visualized intraorbital contents are normal. There is mucosal   thickening and fibrotic secretions in the left maxillary sinus. The   mastoid air cells are clear. The visualized soft tissues and osseous   structures appear normal.    IMPRESSION:    Age-indeterminate infarcts in the right frontal operculum, right corona   radiata, right basal ganglia, and right insular cortex..    MRI brain may be obtained for further assessment.    ---------------------------------------------------------------------------  --------------------------------    CLINICAL INDICATION: Multiple strokes. Worsening clinical exam.    TECHNIQUE: CT of the head was performed without the administration of   intravenous contrast.    COMPARISON: None.    FINDINGS:  There are hypodense foci in the right centrum semiovale within the   frontal lobe, right basal ganglia and right insular ribbon which may   represent lacunar infarcts of indeterminate age. There are no vascular   territorial transcortical infarct.    There is hypoattenuation of the subcortical and periventricular white   matter, which is nonspecific finding, but most likely represents sequela   of chronic microvascular ischemic disease. There is prominence of the   cortical sulci related to underlying brain parenchymal volume loss. The   ventricles arenormal without evidence of hydrocephalus. There are no   extra-axial fluid collections.    The visualized intraorbital contents are normal. The imaged portions of   the paranasal sinuses are clear. The mastoid air cells are clear. The   visualized soft tissues and osseous structures appear normal.    IMPRESSION:    Hypodense foci in the right centrum semiovale, right basal ganglia and   right insular ribbon may represent lacunar infarcts of indeterminate age.   No vascular territorial transcortical infarct.                Vital Signs Last 24 Hrs  T(C): 36.8 (01 Mar 2022 07:00), Max: 36.9 (28 Feb 2022 22:07)  T(F): 98.3 (01 Mar 2022 07:00), Max: 98.4 (28 Feb 2022 22:07)  HR: 98 (01 Mar 2022 09:10) (92 - 104)  BP: 134/80 (01 Mar 2022 09:10) (134/80 - 145/88)  BP(mean): 101 (01 Mar 2022 09:10) (101 - 112)  RR: 16 (01 Mar 2022 09:10) (16 - 20)  SpO2: 99% (01 Mar 2022 09:10) (97% - 100%)        REVIEW OF SYSTEMS:    CONSTITUTIONAL: No fever, weight loss, or fatigue  EYES: No eye pain, visual disturbances, or discharge  ENMT:  No difficulty hearing, tinnitus, vertigo; No sinus or throat pain  NECK: No pain or stiffness  BREASTS: No pain, masses, or nipple discharge  RESPIRATORY: No cough, wheezing, chills or hemoptysis; No shortness of breath  CARDIOVASCULAR: No chest pain, palpitations, dizziness, or leg swelling  GASTROINTESTINAL: No abdominal or epigastric pain. No nausea, vomiting, or hematemesis; No diarrhea or constipation. No melena or hematochezia.  GENITOURINARY: No dysuria, frequency, hematuria, or incontinence  NEUROLOGICAL:   per HPI  SKIN: No itching, burning, rashes, or lesions   LYMPH NODES: No enlarged glands  ENDOCRINE: No heat or cold intolerance; No hair loss  MUSCULOSKELETAL: No joint pain or swelling; No muscle, back, or extremity pain  PSYCHIATRIC: No depression, anxiety, mood swings, or difficulty sleeping  HEME/LYMPH: No easy bruising, or bleeding gums  ALLERGY AND IMMUNOLOGIC: No hives or eczema  VASCULAR: no swelling, erythema,           Physical Exam:   frail/thin 72 yo [place] gentleman lying in semi Padron's position, awake, NAD      Neurologic Exam:    somnolent and oriented to person, speech fluent w/o dysarthria, follows commands    Cranial Nerves:     II:                         pupils equal, round and reactive to light, visual fields intact to BTT  III/ IV/VI:              extraocular movements intact, neg nystagmus, neg ptosis  V:                       unable to assess secondary to somnolence  VII:                      left droop, normal eye closure   VIII:                      unable to assess secondary to somnolence  IX and X:              unable to assess secondary to somnolence  XI:                       SCM/ trapezius strength intact bilateral  XII:                      unable to assess secondary to somnolence    Motor Exam:    Right UE:             poor effort secondary to somnolence,  > 3/5    Left UE:               poor effort secondary to somnolence,  > 3/5    Right LE:             poor effort secondary to somnolence,  > 3/5    Left LE:              poor effort secondary to somnolence,  > 3/5               Sensation:         unable to assess secondary to somnolence                                                   DTR:                  biceps/brachioradialis: equal                                                       patella/ankle: equal                                                                                  neg Babinski                        Coordination:    unable to assess secondary to somnolence                                 Gait:  not tested              PM&R Impression:    1) s/p bilateral embolic strokes    Recommendations/ Plan :    1) Physical / Occupational therapy focusing on therapeutic exercises, bed mobility/transfer out of bed evaluation, progressive ambulation with assistive devices prn.    2) Anticipated Disposition Plan/Recs:    pending functional progress

## 2022-03-01 NOTE — OCCUPATIONAL THERAPY INITIAL EVALUATION ADULT - PERTINENT HX OF CURRENT PROBLEM, REHAB EVAL
Pt found to have acute MCA CVA, b/l acute infarcts with the largest in the R basal ganglia and insular region. EEG negative for seizures.

## 2022-03-01 NOTE — PROGRESS NOTE ADULT - SUBJECTIVE AND OBJECTIVE BOX
Neurology Stroke Progress Note    INTERVAL HPI/OVERNIGHT EVENTS:  Patient seen and examined.  [id number] used. Denies pain. The patient removed his NGT overnight. He has mittens to prevent him removing his lines.      MEDICATIONS  (STANDING):  aspirin  chewable 81 milliGRAM(s) Oral daily  atorvastatin 80 milliGRAM(s) Oral at bedtime  heparin   Injectable 5000 Unit(s) SubCutaneous every 12 hours  metoprolol succinate ER 25 milliGRAM(s) Oral daily  pantoprazole    Tablet 40 milliGRAM(s) Oral before breakfast  senna 2 Tablet(s) Oral at bedtime    MEDICATIONS  (PRN):  polyethylene glycol 3350 17 Gram(s) Oral daily PRN Constipation        Allergies    No Known Allergies    Intolerances      Vital Signs Last 24 Hrs  T(C): 36.8 (01 Mar 2022 07:00), Max: 36.9 (28 Feb 2022 22:07)  T(F): 98.3 (01 Mar 2022 07:00), Max: 98.4 (28 Feb 2022 22:07)  HR: 94 (01 Mar 2022 05:27) (92 - 106)  BP: 134/82 (01 Mar 2022 05:27) (134/82 - 151/86)  BP(mean): 102 (01 Mar 2022 05:27) (102 - 112)  RR: 20 (01 Mar 2022 05:27) (18 - 20)  SpO2: 100% (01 Mar 2022 05:27) (97% - 100%)    Physical exam:  General: Laying in bed, no acute distress.  Eyes: Anicteric sclerae, moist conjunctivae, see below for CNs  Neck: trachea midline  Cardiovascular: Tachycardic. no murmurs, rubs, or gallops. No carotid bruits.   Pulmonary: Anterior breath sounds clear bilaterally, no crackles or wheezing. No use of accessory muscles  GI: Abdomen soft, non-distended, non-tender  Extremities: no edema    Neurologic:  -Mental status: Lethargic, easily arousable with verbal stimuli.  Does not answer orientation questions. Following simple commands.   Cranial nerves:   II: Unable to assess 2/2 limited participation by patient  III, IV, VI: Unable to assess EOMs 2/2 limited participation by patient Pupils equally round and reactive to light  VII: Left facial droop present.  XII: Unable to assess 2/2 limited participation by patient  Motor: Decreased bulk and tone. Unable to assess drift. Strength bilateral upper extremity 3/5, RLE 3/5, LLE 2+/5  Sensation: Grossly intact.  Coordination: Unable to assess 2/2 limited participation by patient    LABS:                        11.8   5.01  )-----------( 266      ( 28 Feb 2022 05:58 )             36.9     02-28    155<H>  |  121<H>  |  29<H>  ----------------------------<  105<H>  4.0   |  26  |  0.90    Ca    9.0      28 Feb 2022 05:58  Phos  2.9     02-28  Mg     2.4     02-28    TPro  6.8  /  Alb  3.5  /  TBili  0.4  /  DBili  x   /  AST  33  /  ALT  9<L>  /  AlkPhos  64  02-27        RADIOLOGY & ADDITIONAL TESTS:      MCA CVA 2/20/22 at OSH   CTH left caudate/basal ganglia age indeterminate infarct   CTA head/neck showed focal right MCA M2 branch stenosis secondary to high grade near total occlusion versus focal occlusion, with occlusion of a right MCA anterior distal m2/M3 branch and posterior M2 branch narrowing with somewhat beaded appearance. Occlusion of left vertebral artery within V1 cannot be excluded. 50% left ICA stenosis. mild right ICA stenosis. 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      MRI Brain showing scattered bilateral acute/subacute infarctions, largest in the right basal ganglia and insular region, as well as in bilateral frontal lobes, left basal ganglia, and right parietal lobe.      EEG was negative for seizure.     NCHCT 2/26 - Age-indeterminate infarcts in the right frontal operculum, right corona   radiata, right basal ganglia, and right insular cortex.. Neurology Stroke Progress Note    INTERVAL HPI/OVERNIGHT EVENTS:  Patient seen and examined. Denies pain. The patient removed his NGT overnight. He has mittens to prevent him removing his lines. The patient was very lethargic this morning, but he got better later during the round.     MEDICATIONS  (STANDING):  aspirin  chewable 81 milliGRAM(s) Oral daily  atorvastatin 80 milliGRAM(s) Oral at bedtime  heparin   Injectable 5000 Unit(s) SubCutaneous every 12 hours  metoprolol succinate ER 25 milliGRAM(s) Oral daily  pantoprazole    Tablet 40 milliGRAM(s) Oral before breakfast  senna 2 Tablet(s) Oral at bedtime    MEDICATIONS  (PRN):  polyethylene glycol 3350 17 Gram(s) Oral daily PRN Constipation        Allergies    No Known Allergies    Intolerances      Vital Signs Last 24 Hrs  T(C): 36.8 (01 Mar 2022 07:00), Max: 36.9 (28 Feb 2022 22:07)  T(F): 98.3 (01 Mar 2022 07:00), Max: 98.4 (28 Feb 2022 22:07)  HR: 94 (01 Mar 2022 05:27) (92 - 106)  BP: 134/82 (01 Mar 2022 05:27) (134/82 - 151/86)  BP(mean): 102 (01 Mar 2022 05:27) (102 - 112)  RR: 20 (01 Mar 2022 05:27) (18 - 20)  SpO2: 100% (01 Mar 2022 05:27) (97% - 100%)    Physical exam:  General: Laying in bed, no acute distress.  Eyes: Anicteric sclerae, moist conjunctivae, see below for CNs  Neck: trachea midline  Cardiovascular: Tachycardic. no murmurs, rubs, or gallops. No carotid bruits.   Pulmonary: Anterior breath sounds clear bilaterally, no crackles or wheezing. No use of accessory muscles  GI: Abdomen soft, non-distended, non-tender  Extremities: no edema    Neurologic:  -Mental status: The patient was awake, alert, and he was following command   Cranial nerves:   II: Showed no clear field cut grossly  III, IV, VI: No eyes deviation or limitation of eye movements. Pupils equally round and reactive to light  VII: Left facial droop present.  XII: Unable to assess 2/2 limited participation by patient  Motor: Decreased bulk and tone. The patient was able to raise both arms with no drift, both legs but with drift to bed, with the Lt slightly better.   Sensation: Grossly intact.  Coordination: No clear ataxia, no tremor.     LABS:                        11.8   5.01  )-----------( 266      ( 28 Feb 2022 05:58 )             36.9     02-28    155<H>  |  121<H>  |  29<H>  ----------------------------<  105<H>  4.0   |  26  |  0.90    Ca    9.0      28 Feb 2022 05:58  Phos  2.9     02-28  Mg     2.4     02-28    TPro  6.8  /  Alb  3.5  /  TBili  0.4  /  DBili  x   /  AST  33  /  ALT  9<L>  /  AlkPhos  64  02-27        RADIOLOGY & ADDITIONAL TESTS:      MCA CVA 2/20/22 at OSH   CTH left caudate/basal ganglia age indeterminate infarct   CTA head/neck showed focal right MCA M2 branch stenosis secondary to high grade near total occlusion versus focal occlusion, with occlusion of a right MCA anterior distal m2/M3 branch and posterior M2 branch narrowing with somewhat beaded appearance. Occlusion of left vertebral artery within V1 cannot be excluded. 50% left ICA stenosis. mild right ICA stenosis. 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      MRI Brain showing scattered bilateral acute/subacute infarctions, largest in the right basal ganglia and insular region, as well as in bilateral frontal lobes, left basal ganglia, and right parietal lobe.      EEG was negative for seizure.     NCHCT 2/26 - Age-indeterminate infarcts in the right frontal operculum, right corona   radiata, right basal ganglia, and right insular cortex..

## 2022-03-01 NOTE — PROGRESS NOTE ADULT - ATTENDING COMMENTS
Our evaluation of the CT - difficult to est wall thickening given no contrast and this maybe borderline.   Resolve acute issues and OK to follow up as outpt with therapeutic GI groups for eval egd/eus/fna.

## 2022-03-01 NOTE — CHART NOTE - NSCHARTNOTEFT_GEN_A_CORE
Admitting Diagnosis:   Patient is a 71y old  Male who presents with a chief complaint of CVA (01 Mar 2022 12:54)    PAST MEDICAL & SURGICAL HISTORY:  Acute on chronic combined systolic and diastolic congestive heart failure  Prediabetes  Smoking hx      Current Nutrition Order:  Diet, DASH/TLC:   Sodium & Cholesterol Restricted  Soft and Bite Sized (SOFTBTSZ)  Mildly Thick Liquids (MILDTHICKLIQS)  Supplement Feeding Modality:  Oral  Ensure Enlive Cans or Servings Per Day:  1       Frequency:  Two Times a day (02-25-22 @ 16:42)    PO Intake: Good (%) [   ]  Fair (</=50%) [ x  ] Poor (<25%) [   ]  -Pt non-verbal and RN reports deficits post stroke as well as [place] citizen speaking. Discussed with RN pt's po intake. States pt ate 25% of lunch and 50% of dinner yesterday (2/28). Typically eats 25-50% of most meals and requires total feeding assistance. Denies signs of aspiration with po intake. Did drink Ensure Enlive with thickener on 2/28.     GI Issues: +BM 3/01, abd-soft, + normal bowel sounds   Pain: non-verbal indicators absent   Skin Integrity: surgical incision- Left thoracotomy   Mauricio score: 13    Labs: sodium 151 H, chloride 117 H, BUN 28 H  03-01    151<H>  |  117<H>  |  28<H>  ----------------------------<  98  see note   |  20<L>  |  0.79    Ca    9.1      01 Mar 2022 06:48  Phos  3.2     03-01  Mg     2.4     03-01    Medications:  MEDICATIONS  (STANDING):  aspirin  chewable 81 milliGRAM(s) Oral daily  atorvastatin 80 milliGRAM(s) Oral at bedtime  heparin   Injectable 5000 Unit(s) SubCutaneous every 12 hours  metoprolol succinate ER 25 milliGRAM(s) Oral daily  pantoprazole    Tablet 40 milliGRAM(s) Oral before breakfast  senna 2 Tablet(s) Oral at bedtime    MEDICATIONS  (PRN):  polyethylene glycol 3350 17 Gram(s) Oral daily PRN Constipation    Anthropometrics:  Ht: Not documented   Wt: 39.8kg     IBW:     % IBW    Weight Change:     Nutrition Focused Physical Exam: Completed [   ]  Not Pertinent [   ]  Muscle Wasting- Temporal [   ]  Clavicle/Pectoral [   ]  Shoulder/Deltoid [   ]  Scapula [   ]  Interosseous [   ]  Quadriceps [   ]  Gastrocnemius [   ]  Fat Wasting- Orbital [   ]  Buccal [   ]  Triceps [   ]  Rib [   ]  Suspect [PCM] 2/2 to physical assessment, [poor intake], and [wt loss]; please see malnutrition chart note.    Estimated energy needs:   Calories:   Protein:   Fluid:    Subjective:     Previous Nutrition Diagnosis:    Active [   ]  Resolved [   ]    If resolved, new PES:     Goal:    Recommendations:    Education:     Risk Level: High [   ] Moderate [   ] Low [   ] Admitting Diagnosis:   Patient is a 71y old  Male who presents with a chief complaint of CVA (01 Mar 2022 12:54)    PAST MEDICAL & SURGICAL HISTORY:  Acute on chronic combined systolic and diastolic congestive heart failure  Prediabetes  Smoking hx      Current Nutrition Order:  Diet, DASH/TLC:   Sodium & Cholesterol Restricted  Soft and Bite Sized (SOFTBTSZ)  Mildly Thick Liquids (MILDTHICKLIQS)  Supplement Feeding Modality:  Oral  Ensure Enlive Cans or Servings Per Day:  1       Frequency:  Two Times a day (02-25-22 @ 16:42)    PO Intake: Good (%) [   ]  Fair (</=50%) [ x  ] Poor (<25%) [   ]  -Pt non-verbal and RN reports deficits post stroke as well as [place] speaking. Discussed with RN pt's po intake. States pt ate 25% of lunch and 50% of dinner yesterday (2/28). Typically eats 25-50% of most meals and requires total feeding assistance. Denies signs of aspiration with po intake. Did drink Ensure Enlive with thickener on 2/28.     GI Issues: +BM 3/01, abd-soft, + normal bowel sounds   Pain: non-verbal indicators absent   Skin Integrity: surgical incision- Left thoracotomy   Mauricio score: 13    Labs: sodium 151 H, chloride 117 H, BUN 28 H  03-01    151<H>  |  117<H>  |  28<H>  ----------------------------<  98  see note   |  20<L>  |  0.79    Ca    9.1      01 Mar 2022 06:48  Phos  3.2     03-01  Mg     2.4     03-01    Medications:  MEDICATIONS  (STANDING):  aspirin  chewable 81 milliGRAM(s) Oral daily  atorvastatin 80 milliGRAM(s) Oral at bedtime  heparin   Injectable 5000 Unit(s) SubCutaneous every 12 hours  metoprolol succinate ER 25 milliGRAM(s) Oral daily  pantoprazole    Tablet 40 milliGRAM(s) Oral before breakfast  senna 2 Tablet(s) Oral at bedtime    MEDICATIONS  (PRN):  polyethylene glycol 3350 17 Gram(s) Oral daily PRN Constipation    Anthropometrics:  Ht: 162.56cm (64") -Per prior assessment   Wt: 39.8kg     IBW: 130# (58.9kg)  %IBW: 68%    Weight Change: No new weight since admission. Please obtain daily weights to monitor trends and collect HT on pt.     Nutrition Focused Physical Exam: Completed [ x  ]  Not Pertinent [   ]  -Severe malnutrition in the context of chronic illness, Diagnosed 2/24    Estimated energy needs: 39.8kg   ABW for estimated needs per clinical judgement, adjusted for malnutrition, chronic illness, age   Calories: 1400-1600kcals (35-40kcals/kg)  Protein: 60-70g (1.5-1.8g/kg)  Fluid: 1ml/kcal or per MD    Subjective:   71M Hungarian speaker, HFrEF (25-30%), Pre-DM (5.9), s/p robotic MidCab 2/9/22 at [facility], recently dc who presented to OSH 2/20 w AMS found to have acute MCA CVA, b/l acute infarctions largest in R basal ganglia and insular region - transferred to [facility] for further management - found also to have hypernatremia, GI ulceration for EGD/EUS 2/28, and Bladder mass c/f malignancy. Urology following and rec MRI vs CT renal protocol and rec outpatient cystoscopy. Ethics consult d/t lack of HCP, discussed GOC with patient's friend. PT recommend acute rehab once medically stable, SW following for d/c planning. SLP completed eval 2/24 and recommend soft and bite sized with mildly thick liquids. SLP followed up 2/28 and continues to recommend current consistency diet with strict aspiration precautions and 1:1 feeding assistance. Also, recommend FEES once pt more arousable.   *If po intake minimal and/ or pt with increase somnolence and aggressive care desired, rec nutrition support vis dobhoff/NGT for short-term nutrition support. Will provide recommendations below.     Previous Nutrition Diagnosis: Malnutrition (severe) R/T suspected inadequate protein-energy intake to meet nutrient needs AEB severe muscle and fat wasting     Active [  x ]  Resolved [   ]    If resolved, new PES:     Goal: Pt to meet at least 75% of nutritional needs during hospital stay consistently via tolerated route.    Recommendations:  1. Continue minced and moist with mildly thick liquids per SLP recs  -SLP continues to follow and reassess   -Provides 1:1 feeding assistance at meals   2. If pt consuming </=25% of meals or has increase somnolents and unsafe for po intake, rec dobhoff/ NGT for short-term nutrition support (TF) if aligns with GOC   -Jevity 1.2 @ 60ml/hr x24hrs   (provides 1440ml TV, 1728kcals, 80g protein, 1162ml free water)   -Water flush per MD  3. Bowel regimen PRN  4. monitor labs     Education: N/A    Risk Level: High [ x  ] Moderate [   ] Low [   ]  Kayce Arroyo RD,CDN,,CNSC

## 2022-03-01 NOTE — PROGRESS NOTE ADULT - SUBJECTIVE AND OBJECTIVE BOX
Pt seen and examined at bedside.  No acute event overnight.  Unable to obtain ROS.      Allergies    No Known Allergies    Intolerances      MEDICATIONS:  MEDICATIONS  (STANDING):  aspirin  chewable 81 milliGRAM(s) Oral daily  atorvastatin 80 milliGRAM(s) Oral at bedtime  heparin   Injectable 5000 Unit(s) SubCutaneous every 12 hours  metoprolol succinate ER 25 milliGRAM(s) Oral daily  pantoprazole    Tablet 40 milliGRAM(s) Oral before breakfast  senna 2 Tablet(s) Oral at bedtime    MEDICATIONS  (PRN):  polyethylene glycol 3350 17 Gram(s) Oral daily PRN Constipation    Vital Signs Last 24 Hrs  T(C): 36.8 (01 Mar 2022 07:00), Max: 36.9 (28 Feb 2022 22:07)  T(F): 98.3 (01 Mar 2022 07:00), Max: 98.4 (28 Feb 2022 22:07)  HR: 98 (01 Mar 2022 09:10) (92 - 104)  BP: 134/80 (01 Mar 2022 09:10) (134/80 - 145/88)  BP(mean): 101 (01 Mar 2022 09:10) (101 - 112)  RR: 16 (01 Mar 2022 09:10) (16 - 20)  SpO2: 99% (01 Mar 2022 09:10) (97% - 100%)    02-28 @ 07:01  -  03-01 @ 07:00  --------------------------------------------------------  IN: 200 mL / OUT: 900 mL / NET: -700 mL      PHYSICAL EXAM:    General: Laying in bed, appears to be in NAD  HEENT: MMM, conjunctiva and sclera clear  Gastrointestinal: Soft non-tender non-distended; No rebound or guarding  Skin: Warm and dry.     LABS:                        12.4   6.22  )-----------( 232      ( 01 Mar 2022 06:48 )             40.7     03-01    151<H>  |  117<H>  |  28<H>  ----------------------------<  98  see note   |  20<L>  |  0.79    Ca    9.1      01 Mar 2022 06:48  Phos  3.2     03-01  Mg     2.4     03-01                        RADIOLOGY & ADDITIONAL STUDIES:

## 2022-03-01 NOTE — OCCUPATIONAL THERAPY INITIAL EVALUATION ADULT - MODIFIED CLINICAL TEST OF SENSORY INTEGRATION IN BALANCE TEST
Pt performed functional mobility with b/l handheld assist and min A x2 ~2 side steps to the L and 4 steps forwards and backwards. Pt with decreased LLE strength requiring max tactile cues for weightshifting and motor planning. Pt performed functional mobility with b/l handheld assist and min A x 1, mod A x 1 ~2 side steps to the L and 4 steps forwards and backwards. Pt with decreased LLE strength requiring max tactile cues for weightshifting and motor planning.

## 2022-03-01 NOTE — PROGRESS NOTE ADULT - ATTENDING COMMENTS
72 YO M with a history of ACC/AHA Stage C ICM (LV 4.7 cm, LVEF 20-25%), CAD s/p MIDCAB LIMA-LAD 2/9/22 by Dr. Santos, and likely gastric malignancy who was admitted one week after discharge from surgery with a CVA (multiple embolic appearing strokes in setting of significant cerebral atherosclerosis) with residual expressive aphasia. He had severe LV dysfunction pre-operatively which appears unchanged at this time. He is euvolemic on exam and hypertensive with room for uptitration of neurohormonal therapy if permissive HTN window able to be lifted. Prognosis is poor due to CVA with poor mental status and likely abdominal malignancy     # Chronic systolic heart failure  - GDMT: start Entresto 24-26 mg BID if OK per neurology re: BP goals post CVA. continue metoprolol succinate 25 mg daily. defer MRA given poor PO intake and hypernatremia   - Diuretics: dry off diuretics    - Device: needs reassessment of LVEF 3 months after GDMT/revasc but overall prognosis is poor and ethics has been consulted.     # CAD  - continue ASA, high potency statin, and beta blocker    # CVA  - management per neurology  - consider empiric anticoagulation given high risk of subclinical AF     HF team will sign off but please call back if any questions or changes in clinical status

## 2022-03-02 DIAGNOSIS — I63.9 CEREBRAL INFARCTION, UNSPECIFIED: ICD-10-CM

## 2022-03-02 DIAGNOSIS — R13.10 DYSPHAGIA, UNSPECIFIED: ICD-10-CM

## 2022-03-02 DIAGNOSIS — R53.81 OTHER MALAISE: ICD-10-CM

## 2022-03-02 DIAGNOSIS — Z51.5 ENCOUNTER FOR PALLIATIVE CARE: ICD-10-CM

## 2022-03-02 LAB
ALBUMIN SERPL ELPH-MCNC: 3.3 G/DL — SIGNIFICANT CHANGE UP (ref 3.3–5)
ALP SERPL-CCNC: 65 U/L — SIGNIFICANT CHANGE UP (ref 40–120)
ALT FLD-CCNC: 19 U/L — SIGNIFICANT CHANGE UP (ref 10–45)
ANION GAP SERPL CALC-SCNC: 10 MMOL/L — SIGNIFICANT CHANGE UP (ref 5–17)
ANION GAP SERPL CALC-SCNC: 13 MMOL/L — SIGNIFICANT CHANGE UP (ref 5–17)
AST SERPL-CCNC: 30 U/L — SIGNIFICANT CHANGE UP (ref 10–40)
BILIRUB SERPL-MCNC: 0.4 MG/DL — SIGNIFICANT CHANGE UP (ref 0.2–1.2)
BUN SERPL-MCNC: 30 MG/DL — HIGH (ref 7–23)
BUN SERPL-MCNC: 30 MG/DL — HIGH (ref 7–23)
CALCIUM SERPL-MCNC: 8.9 MG/DL — SIGNIFICANT CHANGE UP (ref 8.4–10.5)
CALCIUM SERPL-MCNC: 9.6 MG/DL — SIGNIFICANT CHANGE UP (ref 8.4–10.5)
CHLORIDE SERPL-SCNC: 116 MMOL/L — HIGH (ref 96–108)
CHLORIDE SERPL-SCNC: 118 MMOL/L — HIGH (ref 96–108)
CO2 SERPL-SCNC: 21 MMOL/L — LOW (ref 22–31)
CO2 SERPL-SCNC: 24 MMOL/L — SIGNIFICANT CHANGE UP (ref 22–31)
CREAT SERPL-MCNC: 0.89 MG/DL — SIGNIFICANT CHANGE UP (ref 0.5–1.3)
CREAT SERPL-MCNC: 0.91 MG/DL — SIGNIFICANT CHANGE UP (ref 0.5–1.3)
EGFR: 91 ML/MIN/1.73M2 — SIGNIFICANT CHANGE UP
EGFR: 93 ML/MIN/1.73M2 — SIGNIFICANT CHANGE UP
GLUCOSE SERPL-MCNC: 101 MG/DL — HIGH (ref 70–99)
GLUCOSE SERPL-MCNC: 85 MG/DL — SIGNIFICANT CHANGE UP (ref 70–99)
HCT VFR BLD CALC: 40 % — SIGNIFICANT CHANGE UP (ref 39–50)
HGB BLD-MCNC: 12.1 G/DL — LOW (ref 13–17)
MCHC RBC-ENTMCNC: 30.3 GM/DL — LOW (ref 32–36)
MCHC RBC-ENTMCNC: 30.3 PG — SIGNIFICANT CHANGE UP (ref 27–34)
MCV RBC AUTO: 100 FL — SIGNIFICANT CHANGE UP (ref 80–100)
NON-GYNECOLOGICAL CYTOLOGY STUDY: SIGNIFICANT CHANGE UP
NRBC # BLD: 0 /100 WBCS — SIGNIFICANT CHANGE UP (ref 0–0)
PLATELET # BLD AUTO: 223 K/UL — SIGNIFICANT CHANGE UP (ref 150–400)
POTASSIUM SERPL-MCNC: 3.8 MMOL/L — SIGNIFICANT CHANGE UP (ref 3.5–5.3)
POTASSIUM SERPL-MCNC: 3.9 MMOL/L — SIGNIFICANT CHANGE UP (ref 3.5–5.3)
POTASSIUM SERPL-SCNC: 3.8 MMOL/L — SIGNIFICANT CHANGE UP (ref 3.5–5.3)
POTASSIUM SERPL-SCNC: 3.9 MMOL/L — SIGNIFICANT CHANGE UP (ref 3.5–5.3)
PROT SERPL-MCNC: 6.6 G/DL — SIGNIFICANT CHANGE UP (ref 6–8.3)
RBC # BLD: 4 M/UL — LOW (ref 4.2–5.8)
RBC # FLD: 14.6 % — HIGH (ref 10.3–14.5)
SODIUM SERPL-SCNC: 150 MMOL/L — HIGH (ref 135–145)
SODIUM SERPL-SCNC: 152 MMOL/L — HIGH (ref 135–145)
WBC # BLD: 4.89 K/UL — SIGNIFICANT CHANGE UP (ref 3.8–10.5)
WBC # FLD AUTO: 4.89 K/UL — SIGNIFICANT CHANGE UP (ref 3.8–10.5)

## 2022-03-02 PROCEDURE — 99233 SBSQ HOSP IP/OBS HIGH 50: CPT

## 2022-03-02 RX ORDER — SODIUM CHLORIDE 9 MG/ML
500 INJECTION, SOLUTION INTRAVENOUS
Refills: 0 | Status: DISCONTINUED | OUTPATIENT
Start: 2022-03-02 | End: 2022-03-02

## 2022-03-02 RX ORDER — OLANZAPINE 15 MG/1
2.5 TABLET, FILM COATED ORAL ONCE
Refills: 0 | Status: COMPLETED | OUTPATIENT
Start: 2022-03-02 | End: 2022-03-02

## 2022-03-02 RX ORDER — QUETIAPINE FUMARATE 200 MG/1
12.5 TABLET, FILM COATED ORAL AT BEDTIME
Refills: 0 | Status: DISCONTINUED | OUTPATIENT
Start: 2022-03-02 | End: 2022-03-03

## 2022-03-02 RX ORDER — SODIUM CHLORIDE 9 MG/ML
500 INJECTION, SOLUTION INTRAVENOUS
Refills: 0 | Status: DISCONTINUED | OUTPATIENT
Start: 2022-03-02 | End: 2022-03-03

## 2022-03-02 RX ORDER — OLANZAPINE 15 MG/1
2.5 TABLET, FILM COATED ORAL ONCE
Refills: 0 | Status: DISCONTINUED | OUTPATIENT
Start: 2022-03-02 | End: 2022-03-02

## 2022-03-02 RX ADMIN — Medication 25 MILLIGRAM(S): at 06:42

## 2022-03-02 RX ADMIN — HEPARIN SODIUM 5000 UNIT(S): 5000 INJECTION INTRAVENOUS; SUBCUTANEOUS at 17:10

## 2022-03-02 RX ADMIN — HEPARIN SODIUM 5000 UNIT(S): 5000 INJECTION INTRAVENOUS; SUBCUTANEOUS at 06:42

## 2022-03-02 RX ADMIN — PANTOPRAZOLE SODIUM 40 MILLIGRAM(S): 20 TABLET, DELAYED RELEASE ORAL at 06:42

## 2022-03-02 RX ADMIN — SODIUM CHLORIDE 50 MILLILITER(S): 9 INJECTION, SOLUTION INTRAVENOUS at 12:30

## 2022-03-02 RX ADMIN — Medication 81 MILLIGRAM(S): at 12:06

## 2022-03-02 RX ADMIN — OLANZAPINE 2.5 MILLIGRAM(S): 15 TABLET, FILM COATED ORAL at 02:00

## 2022-03-02 NOTE — PROGRESS NOTE ADULT - SUBJECTIVE AND OBJECTIVE BOX
Neurology Stroke Progress Note    INTERVAL HPI/OVERNIGHT EVENTS:  Patient seen and examined. Denies pain. The patient became agitated overnight, and he received a dose of olanzapine. He has mittens to prevent him removing his lines.     MEDICATIONS  (STANDING):  aspirin  chewable 81 milliGRAM(s) Oral daily  atorvastatin 80 milliGRAM(s) Oral at bedtime  heparin   Injectable 5000 Unit(s) SubCutaneous every 12 hours  metoprolol succinate ER 25 milliGRAM(s) Oral daily  pantoprazole    Tablet 40 milliGRAM(s) Oral before breakfast  senna 2 Tablet(s) Oral at bedtime    MEDICATIONS  (PRN):  OLANZapine 2.5 milliGRAM(s) Oral once PRN agitation  polyethylene glycol 3350 17 Gram(s) Oral daily PRN Constipation        Allergies    No Known Allergies    Intolerances    Vital Signs Last 24 Hrs  T(C): 36.5 (02 Mar 2022 06:08), Max: 36.8 (01 Mar 2022 10:06)  T(F): 97.7 (02 Mar 2022 06:08), Max: 98.2 (01 Mar 2022 10:06)  HR: 104 (02 Mar 2022 06:13) (78 - 114)  BP: 160/96 (02 Mar 2022 06:13) (122/78 - 162/100)  BP(mean): 123 (02 Mar 2022 06:13) (95 - 125)  RR: 20 (02 Mar 2022 06:13) (16 - 24)  SpO2: 98% (02 Mar 2022 06:13) (90% - 99%)        Physical exam:  General: Laying in bed, no acute distress.  Eyes: Anicteric sclerae, moist conjunctivae, see below for CNs  Neck: trachea midline  Cardiovascular: Tachycardic. no murmurs, rubs, or gallops. No carotid bruits.   Pulmonary: Anterior breath sounds clear bilaterally, no crackles or wheezing. No use of accessory muscles  GI: Abdomen soft, non-distended, non-tender  Extremities: no edema    Neurologic:  -Mental status: The patient was awake, alert, and he was following command   Cranial nerves:   II: Showed no clear field cut grossly  III, IV, VI: No eyes deviation or limitation of eye movements. Pupils equally round and reactive to light  VII: Left facial droop present.  XII: Unable to assess 2/2 limited participation by patient  Motor: Decreased bulk and tone. The patient was able to raise both arms with no drift, both legs but with drift to bed, with the Lt slightly better.   Sensation: Grossly intact.  Coordination: No clear ataxia, no tremor.       LABS:  cret                        12.1   4.89  )-----------( 223      ( 02 Mar 2022 06:03 )             40.0     03-02    152<H>  |  118<H>  |  30<H>  ----------------------------<  85  3.8   |  21<L>  |  0.91    Ca    9.6      02 Mar 2022 06:03  Phos  3.2     03-01  Mg     2.4     03-01    TPro  6.6  /  Alb  3.3  /  TBili  0.4  /  DBili  x   /  AST  30  /  ALT  19  /  AlkPhos  65  03-02        RADIOLOGY & ADDITIONAL TESTS:      MCA CVA 2/20/22 at OSH   CTH left caudate/basal ganglia age indeterminate infarct   CTA head/neck showed focal right MCA M2 branch stenosis secondary to high grade near total occlusion versus focal occlusion, with occlusion of a right MCA anterior distal m2/M3 branch and posterior M2 branch narrowing with somewhat beaded appearance. Occlusion of left vertebral artery within V1 cannot be excluded. 50% left ICA stenosis. mild right ICA stenosis. 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      MRI Brain showing scattered bilateral acute/subacute infarctions, largest in the right basal ganglia and insular region, as well as in bilateral frontal lobes, left basal ganglia, and right parietal lobe.      EEG was negative for seizure.     NCHCT 2/26 - Age-indeterminate infarcts in the right frontal operculum, right corona   radiata, right basal ganglia, and right insular cortex..

## 2022-03-02 NOTE — PROGRESS NOTE ADULT - ATTENDING COMMENTS
The patient is a 71-year-old Kazakh/English-speaking male with a history of recent tobacco dependence, CHF with reduced ejection fraction (20%), prediabetes, and CAD status post robotic MIDCAB 2/9/2022.  He was discharged neurologically intact.  He presented 2/20/2022 with altered mental status with work-up revealing multifocal, bilateral acute and subacute infarcts by report of unclear mechanism though occult a fib, aortic athero, and malignancy driving hypercoagulable state are possibilities.    Outside images in transit. [facility] MRI showed bilateral , multifocal strokes. Repeat HCT here shows several R-sided subcortical subacute strokes. Plan to repeat TTE/JOEY along with EGD/EUS for gastric mass per GI. Will f/u urinary cytology per urology given concern for bladder cancer.  Concern is for active malignancy and we will need to obtain as much diagnostic confirmation as possible while inpatient as patient's long-term secondary stroke prevention regimen depends on it.     For now, will continue aspirin alone given potential need for biopsy as well as recent hematuria in setting of probable bladder cancer. We will consider IV heparin pending review of images given concern for embolic process and/or hypercoagulable state related to malignancy. Continue statin.     Monitor Sodium. Will attempt IVF as patient unable to tolerate NGT.    Appreciate pall care/ethics input. For now, patient's roommate will act as surrogate decision maker.

## 2022-03-02 NOTE — PROGRESS NOTE ADULT - ASSESSMENT
71M Lao speaker, HFrEF (25-30%), Pre-DM (5.9), s/p robotic MidCab 2/9/22 at [facility], recently dc who presented to OSH 2/20 w AMS found to have acute MCA CVA, b/l acute infarctions largest in R basal ganglia and insular region - transferred to [facility] for further management - found also to have hypernatremia, GI ulceration for EGD/EUS 2/28, and Bladder mass c/f malignancy    Impression and Plan   1. Acute Encephalopathy Due to Multiple strokes , complicated by metabolic component of Hypernatremia   2. Multiple strokes   - suspect embolic. MCA CVA, B/L acute infarctions in R basal ganglia and insular region  - TSH elevated at last admission. FT4 wnl   - Plavix and Anticoagulation on hold for EGD   - Atorvastatin    - Cardiology planning for JOEY and Coordinating with GI who are planning EGD for the Gastric mass     3. HFrEF - Appears Volume depleted  EF 20% on TTE.  Metoprolol Tartrate 25 mg daily , HF team recommending Entresto, can start once EGD and JOEY are done and once the patient is not NPO any more , he has mild elevation in S Creatinine over past few days and mild elevation of BUN , in the setting of hypovolemia further worsening of BUN and Creatinine may be attributed to Entresto and javier the patient as some one who is not tolerating the medicine and hence would recommend starting it once patient able to tolerate diet , PO fluid intake and his hypernatremia and BUN/ Scr stabilize     4. CAD - no chest pain.  CCTA 2/7 - severe calcified pLAD to distal L Main.     5. Hypernatremia   -  Likely 2/2 decreased free water intake.   - After receiving free water through NGT on 2/28 his sodium is improving , he removed the NGT, IV 1/2 NS for total of 500ml and recheck Na in AM     6. GI ulceration vs antral mass - plan for EGD/EUS w GI on Na improves and Neurological status Improves     7. Bladder mass   - Urology following and recommended MRI vs CT renal protocol. UA w cytology for possible malignancy. Recommended for outpatient cystoscopy   - Walsh in place    # Ethical Issue - Lack of HCP , Ethics team recommended Discussing goals of care with Patients friend

## 2022-03-02 NOTE — PROGRESS NOTE ADULT - ASSESSMENT
71M Sami speaking smoker (very recently quit, smoked x 40 years) with PMHx of CHF (EF 25-30%) and prediabetes (A1c 5.9) who is now s/p robotic MIDCAB with Dr. Santos on 02/09/2022, ambulating well and discharged on POD 4, returned to OSH on 02/20/2022 with AMS, MRI showed bilateral embolic appearing strokes. Pt had ECHO, EEG and cerebral angio done at OSH as well. Patient was transferred to [facility] for further MGMT and work up, initially to CT surg but then tx to stroke service given CVA as primary reason for admission. He is pending TTE and JOEY. Course has been complicated by hypernatremia as well as known bladder and gastric masses, pending EGD +/- EUS.   CT brain showed multiple hypogenicities in different territories     Neuro     #CVA  - continue aspirin 81mg daily   - continue atorvastatin 80mg daily   - q4hr stroke neuro checks and vitals   - TTE with bubble pending  - JOEY pending.   - b/l LE US 2/26 - negative for DVT    Cardiology    #HTN   - Goal SBP <180  - continue metoprolol succinate ER 25mg QD      #Heart failure   EF 20%   - Avoid fluid overload    - Start Entresto 24-26  - I/Os    - CT surgery consulted, appreciate recommendations    #HLD   - high dose statin as above in CVA   - LDL results: 90     Pulm   No active issues  - call provider if SPO2 < 94%     GI     # Stomach mass   - GI will follow as outpatient     #Nutrition/Fluids/Electrolytes   Hypernatremia is better today    - replete K<4 and Mg <2   - Diet: DASH/TLC - pending NGT placement for free water boluses.   - IVF: None   - Continue to encourage oral water intake       Renal  - Daily BMP  - US Retroperitoneal Complete ordered - renal lesion seen on previous CT A/P    #bladder mass  - urine cytology ordered  - maintain evans, will be discharged with as per   -  following, appreciate recs    Hematology  #hypernatremia? chronic  - will place NGT today  - free water boluses  - daily BMP    Infectious Disease   No active issue      Endocrine   A1c 5.4  TSH 7.64, formal FT4    DVT Prophylaxis   - heparin x sq  - SCDs for DVT prophylaxis      IDR Goals: Goals reviewed at interdisciplinary rounds with case management, social work, physical therapy, occupational therapy, and speech language pathology.   Please see specific therapy  notes for in depth goals.  Dispo: pending PT/OT eval     Discussed daily hospital plans and goals with patient and have updated patient's roommate. Palliative and ethics are being consulted given patient's lack of family and patient unable to make healthcare decisions for self at this time.     Discussed with Neurology Attending Dr. Nazia Joiner

## 2022-03-02 NOTE — PROGRESS NOTE ADULT - SUBJECTIVE AND OBJECTIVE BOX
Patient is a 71y old  Male who presents with a chief complaint of CVA (02 Mar 2022 07:37)        SUBJECTIVE:  Patient was seen and examined at bedside. awake and alert  ,following instructions appropriately , denies shortness of breath , abd pain     Review of systems: Patient denies: fever, chills, dizziness, HA, Changes in vision, CP, dyspnea, nausea or vomiting, dysuria, changes in bowel movements, LE edema. Rest of 12 point Review of systems negative unless otherwise documented elsewhere in note.     Diet, DASH/TLC:   Sodium & Cholesterol Restricted  Soft and Bite Sized (SOFTBTSZ)  Mildly Thick Liquids (MILDTHICKLIQS)  Supplement Feeding Modality:  Oral  Ensure Enlive Cans or Servings Per Day:  1       Frequency:  Two Times a day (02-25-22 @ 16:42) [Active]      MEDICATIONS:  MEDICATIONS  (STANDING):  aspirin  chewable 81 milliGRAM(s) Oral daily  atorvastatin 80 milliGRAM(s) Oral at bedtime  heparin   Injectable 5000 Unit(s) SubCutaneous every 12 hours  metoprolol succinate ER 25 milliGRAM(s) Oral daily  pantoprazole    Tablet 40 milliGRAM(s) Oral before breakfast  senna 2 Tablet(s) Oral at bedtime  sodium chloride 0.45%. 500 milliLiter(s) (50 mL/Hr) IV Continuous <Continuous>    MEDICATIONS  (PRN):  OLANZapine 2.5 milliGRAM(s) Oral once PRN agitation  polyethylene glycol 3350 17 Gram(s) Oral daily PRN Constipation      Allergies    No Known Allergies    Intolerances        OBJECTIVE:  Vital Signs Last 24 Hrs  T(C): 36.5 (02 Mar 2022 06:08), Max: 36.7 (02 Mar 2022 01:08)  T(F): 97.7 (02 Mar 2022 06:08), Max: 98 (02 Mar 2022 01:08)  HR: 86 (02 Mar 2022 12:00) (78 - 104)  BP: 116/67 (02 Mar 2022 12:00) (116/67 - 162/100)  BP(mean): 86 (02 Mar 2022 12:00) (86 - 125)  RR: 18 (02 Mar 2022 12:00) (16 - 24)  SpO2: 97% (02 Mar 2022 12:00) (90% - 99%)  I&O's Summary    01 Mar 2022 07:01  -  02 Mar 2022 07:00  --------------------------------------------------------  IN: 0 mL / OUT: 400 mL / NET: -400 mL    02 Mar 2022 07:01  -  02 Mar 2022 14:52  --------------------------------------------------------  IN: 150 mL / OUT: 0 mL / NET: 150 mL        PHYSICAL EXAM:  Gen: Thin and cachectic appearing male , Reclining in bed at time of exam, not in distress   HEENT: dry oral mucosa , no lesions   Neck: supple, trachea at midline  CV: RRR, +S1/S2  Pulm: adequate respiratory effort, no increase in work of breathing  Abd: soft, NTND  Skin: warm and dry, no new rashes vs prior report  Ext: WWP, no LE edema  Neuro: AOx3, no gross focal neurological deficits  Psych: affect and behavior appropriate, pleasant at time of interview    LABS:                        12.1   4.89  )-----------( 223      ( 02 Mar 2022 06:03 )             40.0     03-02    152<H>  |  118<H>  |  30<H>  ----------------------------<  85  3.8   |  21<L>  |  0.91    Ca    9.6      02 Mar 2022 06:03  Phos  3.2     03-01  Mg     2.4     03-01    TPro  6.6  /  Alb  3.3  /  TBili  0.4  /  DBili  x   /  AST  30  /  ALT  19  /  AlkPhos  65  03-02    LIVER FUNCTIONS - ( 02 Mar 2022 06:03 )  Alb: 3.3 g/dL / Pro: 6.6 g/dL / ALK PHOS: 65 U/L / ALT: 19 U/L / AST: 30 U/L / GGT: x             CAPILLARY BLOOD GLUCOSE            MICRODATA:      RADIOLOGY/OTHER STUDIES:

## 2022-03-03 ENCOUNTER — APPOINTMENT (OUTPATIENT)
Dept: NEUROSURGERY | Facility: HOSPITAL | Age: 72
End: 2022-03-03
Payer: MEDICARE

## 2022-03-03 LAB
ANION GAP SERPL CALC-SCNC: 14 MMOL/L — SIGNIFICANT CHANGE UP (ref 5–17)
APTT BLD: 31.4 SEC — SIGNIFICANT CHANGE UP (ref 27.5–35.5)
BUN SERPL-MCNC: 24 MG/DL — HIGH (ref 7–23)
CALCIUM SERPL-MCNC: 9.4 MG/DL — SIGNIFICANT CHANGE UP (ref 8.4–10.5)
CHLORIDE SERPL-SCNC: 115 MMOL/L — HIGH (ref 96–108)
CO2 SERPL-SCNC: 21 MMOL/L — LOW (ref 22–31)
CREAT SERPL-MCNC: 0.87 MG/DL — SIGNIFICANT CHANGE UP (ref 0.5–1.3)
EGFR: 93 ML/MIN/1.73M2 — SIGNIFICANT CHANGE UP
GLUCOSE BLDC GLUCOMTR-MCNC: 77 MG/DL — SIGNIFICANT CHANGE UP (ref 70–99)
GLUCOSE SERPL-MCNC: 87 MG/DL — SIGNIFICANT CHANGE UP (ref 70–99)
HCT VFR BLD CALC: 44.4 % — SIGNIFICANT CHANGE UP (ref 39–50)
HGB BLD-MCNC: 14 G/DL — SIGNIFICANT CHANGE UP (ref 13–17)
INR BLD: 1.05 — SIGNIFICANT CHANGE UP (ref 0.88–1.16)
MAGNESIUM SERPL-MCNC: 2.5 MG/DL — SIGNIFICANT CHANGE UP (ref 1.6–2.6)
MCHC RBC-ENTMCNC: 31.1 PG — SIGNIFICANT CHANGE UP (ref 27–34)
MCHC RBC-ENTMCNC: 31.5 GM/DL — LOW (ref 32–36)
MCV RBC AUTO: 98.7 FL — SIGNIFICANT CHANGE UP (ref 80–100)
NRBC # BLD: 0 /100 WBCS — SIGNIFICANT CHANGE UP (ref 0–0)
PHOSPHATE SERPL-MCNC: 3.5 MG/DL — SIGNIFICANT CHANGE UP (ref 2.5–4.5)
PLATELET # BLD AUTO: 251 K/UL — SIGNIFICANT CHANGE UP (ref 150–400)
POTASSIUM SERPL-MCNC: 3.8 MMOL/L — SIGNIFICANT CHANGE UP (ref 3.5–5.3)
POTASSIUM SERPL-SCNC: 3.8 MMOL/L — SIGNIFICANT CHANGE UP (ref 3.5–5.3)
PROTHROM AB SERPL-ACNC: 12.5 SEC — SIGNIFICANT CHANGE UP (ref 10.5–13.4)
RBC # BLD: 4.5 M/UL — SIGNIFICANT CHANGE UP (ref 4.2–5.8)
RBC # FLD: 14.6 % — HIGH (ref 10.3–14.5)
SODIUM SERPL-SCNC: 150 MMOL/L — HIGH (ref 135–145)
WBC # BLD: 5.06 K/UL — SIGNIFICANT CHANGE UP (ref 3.8–10.5)
WBC # FLD AUTO: 5.06 K/UL — SIGNIFICANT CHANGE UP (ref 3.8–10.5)

## 2022-03-03 PROCEDURE — 70496 CT ANGIOGRAPHY HEAD: CPT | Mod: 26

## 2022-03-03 PROCEDURE — 70460 CT HEAD/BRAIN W/DYE: CPT | Mod: 26,MH

## 2022-03-03 PROCEDURE — 99233 SBSQ HOSP IP/OBS HIGH 50: CPT

## 2022-03-03 PROCEDURE — 70450 CT HEAD/BRAIN W/O DYE: CPT | Mod: 26,59

## 2022-03-03 PROCEDURE — 0042T: CPT

## 2022-03-03 PROCEDURE — 61645 PERQ ART M-THROMBECT &/NFS: CPT | Mod: RT

## 2022-03-03 PROCEDURE — 70498 CT ANGIOGRAPHY NECK: CPT | Mod: 26

## 2022-03-03 PROCEDURE — 70450 CT HEAD/BRAIN W/O DYE: CPT | Mod: 26,59,77

## 2022-03-03 PROCEDURE — 76377 3D RENDER W/INTRP POSTPROCES: CPT | Mod: 26

## 2022-03-03 PROCEDURE — 99291 CRITICAL CARE FIRST HOUR: CPT

## 2022-03-03 PROCEDURE — 99292 CRITICAL CARE ADDL 30 MIN: CPT

## 2022-03-03 PROCEDURE — 71045 X-RAY EXAM CHEST 1 VIEW: CPT | Mod: 26

## 2022-03-03 RX ORDER — LABETALOL HCL 100 MG
10 TABLET ORAL ONCE
Refills: 0 | Status: COMPLETED | OUTPATIENT
Start: 2022-03-03 | End: 2022-03-03

## 2022-03-03 RX ORDER — ACETAMINOPHEN 500 MG
650 TABLET ORAL EVERY 6 HOURS
Refills: 0 | Status: DISCONTINUED | OUTPATIENT
Start: 2022-03-03 | End: 2022-03-07

## 2022-03-03 RX ORDER — PANTOPRAZOLE SODIUM 20 MG/1
40 TABLET, DELAYED RELEASE ORAL DAILY
Refills: 0 | Status: DISCONTINUED | OUTPATIENT
Start: 2022-03-04 | End: 2022-03-07

## 2022-03-03 RX ORDER — ATORVASTATIN CALCIUM 80 MG/1
80 TABLET, FILM COATED ORAL AT BEDTIME
Refills: 0 | Status: DISCONTINUED | OUTPATIENT
Start: 2022-03-03 | End: 2022-03-07

## 2022-03-03 RX ORDER — METOPROLOL TARTRATE 50 MG
12.5 TABLET ORAL
Refills: 0 | Status: DISCONTINUED | OUTPATIENT
Start: 2022-03-03 | End: 2022-03-07

## 2022-03-03 RX ORDER — POLYETHYLENE GLYCOL 3350 17 G/17G
17 POWDER, FOR SOLUTION ORAL DAILY
Refills: 0 | Status: DISCONTINUED | OUTPATIENT
Start: 2022-03-03 | End: 2022-03-04

## 2022-03-03 RX ORDER — ASPIRIN/CALCIUM CARB/MAGNESIUM 324 MG
81 TABLET ORAL DAILY
Refills: 0 | Status: DISCONTINUED | OUTPATIENT
Start: 2022-03-03 | End: 2022-03-07

## 2022-03-03 RX ORDER — SENNA PLUS 8.6 MG/1
2 TABLET ORAL AT BEDTIME
Refills: 0 | Status: DISCONTINUED | OUTPATIENT
Start: 2022-03-03 | End: 2022-03-07

## 2022-03-03 RX ORDER — SODIUM CHLORIDE 9 MG/ML
250 INJECTION INTRAMUSCULAR; INTRAVENOUS; SUBCUTANEOUS ONCE
Refills: 0 | Status: COMPLETED | OUTPATIENT
Start: 2022-03-03 | End: 2022-03-03

## 2022-03-03 RX ADMIN — Medication 12.5 MILLIGRAM(S): at 23:03

## 2022-03-03 RX ADMIN — ATORVASTATIN CALCIUM 80 MILLIGRAM(S): 80 TABLET, FILM COATED ORAL at 23:02

## 2022-03-03 RX ADMIN — Medication 81 MILLIGRAM(S): at 23:02

## 2022-03-03 RX ADMIN — Medication 10 MILLIGRAM(S): at 13:51

## 2022-03-03 RX ADMIN — HEPARIN SODIUM 5000 UNIT(S): 5000 INJECTION INTRAVENOUS; SUBCUTANEOUS at 06:37

## 2022-03-03 RX ADMIN — SODIUM CHLORIDE 500 MILLILITER(S): 9 INJECTION INTRAMUSCULAR; INTRAVENOUS; SUBCUTANEOUS at 17:58

## 2022-03-03 RX ADMIN — SENNA PLUS 2 TABLET(S): 8.6 TABLET ORAL at 23:02

## 2022-03-03 NOTE — PROGRESS NOTE ADULT - ASSESSMENT
71y/M with  acute cerebrovascular accident, s/p thrombectomy (03/03/2022, Dr. Garcia)  acute encephalopathy  acute on chronic, systolic/diastolic CHF; CAD  prediabetes  GI ulceration / antral mass, bladder mass  smoking history    PLAN:   NEURO:  REHAB:  physical therapy evaluation and management    EARLY MOB:      PULM:  Room air, incentive spirometry  CARDIO:  SBP goal 100-150mm Hg  ENDO:  Blood sugar goals 140-180 mg/dL, continue insulin sliding scale  GI:  PPI for GI prophylaxis  DIET:  RENAL:    HEM/ONC:  VTE Prophylaxis:     ID: afebrile, no leukocytosis  ====================   T(F): , Max: 99.4 (03-02-22 @ 22:42)    WBC Count: 5.06 (03-03)  WBC Count: 4.89 (03-02)    ====================  Social: will update family    ATTENDING ATTESTATION:  I was physically present for the key portions of the evaluation and management (E/M) service provided.  I agree with the above history, physical and plan, which I have reviewed and edited where appropriate.    Patient at high risk for neurological deterioration or death due to:  ICU delirium, aspiration PNA, DVT / PE.  Critical care time:  I have personally provided 45 minutes of critical care time, excluding time spent on separate procedures.      Plan discussed with RN, house staff. 71y/M with  acute cerebrovascular accident, s/p thrombectomy (03/03/2022, Dr. Garcia)  acute encephalopathy  acute on chronic, systolic/diastolic CHF; CAD  prediabetes  GI ulceration / antral mass, bladder mass  smoking history    PLAN:   NEURO: neurochecks q1h, PRN pain meds with acetaminophen  s/p thrombectomy, CT head today  holding ASA  groin checks  hold quetiapine  REHAB:  physical therapy evaluation and management    EARLY MOB:   bed rest, flat x4h    PULM:  PRN O2 support to keep sats >/=92%, incentive spirometry  CARDIO:  SBP goal 120-150mm Hg; EF 20%, continue metoprolol  ENDO:  Blood sugar goals 140-180 mg/dL, continue statins  GI:  stomach mass - GI following  DIET: NPO for now, insert NGT  RENAL:  hold IVF, hold furosemide, Na goal 135-145  HEM/ONC: check post-op Hb  VTE Prophylaxis: SCDs, no DVT chemoprophylaxis for now as patient is high risk for bleed  ID: afebrile, no leukocytosis  Social: will update family    ATTENDING ATTESTATION:  I was physically present for the key portions of the evaluation and management (E/M) service provided.  I agree with the above history, physical and plan, which I have reviewed and edited where appropriate.    Patient at high risk for neurological deterioration or death due to:  ICU delirium, aspiration PNA, DVT / PE.  Critical care time:  I have personally provided 45 minutes of critical care time, excluding time spent on separate procedures.      Plan discussed with RN, house staff.

## 2022-03-03 NOTE — PROGRESS NOTE ADULT - ATTENDING COMMENTS
The patient is a 71-year-old German/English-speaking male with a history of recent tobacco dependence, severe ICAD, CHF with reduced ejection fraction (20%), prediabetes, CAD status post robotic MIDCAB 2/9/2022, bladder mass concerning for malignancy (not biopsy proved), and gastric mass (?maligancy, pending EGD).      He presented 2/20/2022 with altered mental status with work-up revealing multifocal, bilateral acute and subacute infarcts by report of unclear mechanism though occult a fib, aortic athero, and malignancy driving hypercoagulable state are possibilities; ICAD thought less likely due to bilateral involvement though he did have severe ICAD.  Plan had been to clarify potential for underlying malignancy (GI, ) and also perform JOEY with EGD prior to transtioning patient to empiric anticoagluation.     Plan to repeat TTE/JOEY along with EGD/EUS for gastric mass per GI. Will f/u urinary cytology per urology given concern for bladder cancer.  Concern is for active malignancy and we will need to obtain as much diagnostic confirmation as possible while inpatient as patient's long-term secondary stroke prevention regimen depends on it given potential hemorrhagic risk of GI tumor, hematuria.     Unfortunately, patient developed R MCA syndrome this AM. Unclear last known well, but definitively within last 24 hours. CTA showed distal M1 with CTP showing significant penumbra and no core infarct. He was taken for successful thrombectomy and is now in ICU post-procedure. Plan to repeat CTH in 6 hours given blush seen during angiogram.

## 2022-03-03 NOTE — PROVIDER CONTACT NOTE (OTHER) - SITUATION
Received status post thrombectomy via right groin. Right groin with gauze and tegoderm clean dry and soft to touch. Right DP +2 doppler. Unable to doppler right PT pulse

## 2022-03-03 NOTE — PROGRESS NOTE ADULT - SUBJECTIVE AND OBJECTIVE BOX
Neurology Stroke Progress Note    INTERVAL HPI/OVERNIGHT EVENTS:  Patient seen and examined. The patient was lethargic not following commands.      MEDICATIONS  (STANDING):  aspirin  chewable 81 milliGRAM(s) Oral daily  atorvastatin 80 milliGRAM(s) Oral at bedtime  heparin   Injectable 5000 Unit(s) SubCutaneous every 12 hours  metoprolol succinate ER 25 milliGRAM(s) Oral daily  pantoprazole    Tablet 40 milliGRAM(s) Oral before breakfast  QUEtiapine 12.5 milliGRAM(s) Oral at bedtime  senna 2 Tablet(s) Oral at bedtime  sodium chloride 0.45%. 500 milliLiter(s) (50 mL/Hr) IV Continuous <Continuous>    MEDICATIONS  (PRN):  polyethylene glycol 3350 17 Gram(s) Oral daily PRN Constipation          Allergies    No Known Allergies    Intolerances    Vital Signs Last 24 Hrs  T(C): 36.8 (03 Mar 2022 04:35), Max: 37.4 (02 Mar 2022 22:42)  T(F): 98.2 (03 Mar 2022 04:35), Max: 99.4 (02 Mar 2022 22:42)  HR: 96 (03 Mar 2022 04:00) (86 - 100)  BP: 131/77 (03 Mar 2022 04:00) (116/67 - 144/85)  BP(mean): 99 (03 Mar 2022 04:00) (86 - 107)  RR: 18 (03 Mar 2022 04:00) (16 - 18)  SpO2: 99% (03 Mar 2022 04:00) (95% - 100%)          Physical exam:  General: Laying in bed, no acute distress.  Eyes: Anicteric sclerae, moist conjunctivae, see below for CNs  Neck: trachea midline  Cardiovascular: Tachycardic. no murmurs, rubs, or gallops. No carotid bruits.   Pulmonary: Anterior breath sounds clear bilaterally, no crackles or wheezing. No use of accessory muscles  GI: Abdomen soft, non-distended, non-tender  Extremities: no edema    Neurologic:  -Mental status: The patient lethargic, not following commands, opening his eyes to noxious stimuli.    Cranial nerves:   II: No clear cut on threaten reflex, however it was limited as the patient was not opening his eyes sponaneously  III, IV, VI: Gaze preference to the Rt  VII: Left facial droop present.  XII: Unable to assess 2/2 limited participation by patient  Motor: Decreased bulk and tone. The patient was not following any commands. On noxious stimuli, he is moving the Rt side better that the Lt side. No abnormal movements.   Coordination: Could not be assessed      LABS:  cret                        12.1   4.89  )-----------( 223      ( 02 Mar 2022 06:03 )             40.0     03-02    150<H>  |  116<H>  |  30<H>  ----------------------------<  101<H>  3.9   |  24  |  0.89    Ca    8.9      02 Mar 2022 18:40    TPro  6.6  /  Alb  3.3  /  TBili  0.4  /  DBili  x   /  AST  30  /  ALT  19  /  AlkPhos  65  03-02        RADIOLOGY & ADDITIONAL TESTS:      MCA CVA 2/20/22 at OSH   CTH left caudate/basal ganglia age indeterminate infarct   CTA head/neck showed focal right MCA M2 branch stenosis secondary to high grade near total occlusion versus focal occlusion, with occlusion of a right MCA anterior distal m2/M3 branch and posterior M2 branch narrowing with somewhat beaded appearance. Occlusion of left vertebral artery within V1 cannot be excluded. 50% left ICA stenosis. mild right ICA stenosis. 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      MRI Brain showing scattered bilateral acute/subacute infarctions, largest in the right basal ganglia and insular region, as well as in bilateral frontal lobes, left basal ganglia, and right parietal lobe.      EEG was negative for seizure.     NCHCT 2/26 - Age-indeterminate infarcts in the right frontal operculum, right corona   radiata, right basal ganglia, and right insular cortex..

## 2022-03-03 NOTE — PROGRESS NOTE ADULT - ASSESSMENT
71M Nepali speaking smoker (very recently quit, smoked x 40 years) with PMHx of CHF (EF 25-30%) and prediabetes (A1c 5.9) who is now s/p robotic MIDCAB with Dr. Santos on 02/09/2022, ambulating well and discharged on POD 4, returned to OSH on 02/20/2022 with AMS, MRI showed bilateral embolic appearing strokes. Pt had ECHO, EEG and cerebral angio done at OSH as well. Patient was transferred to [facility] for further MGMT and work up, initially to CT surg but then tx to stroke service given CVA as primary reason for admission. He is pending TTE and JOEY. Course has been complicated by hypernatremia as well as known bladder and gastric masses, pending EGD +/- EUS.   CT brain showed multiple hypogenicities in different territories     Neuro     #CVA  The patient has new neurological symptoms (as in exam)  - Repeat CTH urgently to r/u a new stroke vs hemorrhagic transformation   - continue aspirin 81mg daily   - continue atorvastatin 80mg daily   - q2hr stroke neuro checks and vitals   - TTE with bubble pending  - JOEY pending.   - b/l LE US 2/26 - negative for DVT    Cardiology    #HTN   - Goal SBP <180  - continue metoprolol succinate ER 25mg QD      #Heart failure   EF 20%   - Avoid fluid overload    - Start Entresto 24-26 after fluid rehydration   - I/Os    - CT surgery consulted, appreciate recommendations    #HLD   - high dose statin as above in CVA   - LDL results: 90     Pulm   No active issues  - call provider if SPO2 < 94%     GI     # Stomach mass   - GI will follow as outpatient, as per GI: the lesion is very unlikely to be a malignant lesion, more as over read     #Nutrition/Fluids/Electrolytes   Hypernatremia is better today    - replete K<4 and Mg <2   - Diet: DASH/TLC    - IVF: He received 500ml of 1/2 NS over 10h  - Continue to encourage oral water intake       Renal  - Daily BMP  - US Retroperitoneal Complete ordered - renal lesion seen on previous CT A/P    #bladder mass  - urine cytology pending   - maintain evans, will be discharged with as per   -  following, appreciate recs        Infectious Disease   No active issue      Endocrine   A1c 5.4  TSH 7.64, formal FT4    DVT Prophylaxis   - heparin x sq  - SCDs for DVT prophylaxis      IDR Goals: Goals reviewed at interdisciplinary rounds with case management, social work, physical therapy, occupational therapy, and speech language pathology.   Please see specific therapy  notes for in depth goals.  Dispo: pending PT/OT eval     Discussed daily hospital plans and goals with patient and have updated patient's roommate. Palliative and ethics are being consulted given patient's lack of family and patient unable to make healthcare decisions for self at this time.     Discussed with Neurology Attending Dr. Naiza Joiner   71M French speaking smoker (very recently quit, smoked x 40 years) with PMHx of CHF (EF 25-30%) and prediabetes (A1c 5.9) who is now s/p robotic MIDCAB with Dr. Santos on 02/09/2022, ambulating well and discharged on POD 4, returned to OSH on 02/20/2022 with AMS, MRI showed bilateral embolic appearing strokes. Pt had ECHO, EEG and cerebral angio done at OSH as well. Patient was transferred to [facility] for further MGMT and work up, initially to CT surg but then tx to stroke service given CVA as primary reason for admission. He is pending TTE and JOEY. Course has been complicated by hypernatremia as well as known bladder and gastric masses, pending EGD +/- EUS.   CT brain showed multiple hypogenicities in different territories     Neuro     #CVA  The patient has new neurological symptoms (as in exam)  Code stroke was called and CTH, CTA ,CTP were done and showed penumbra area of 52ml on perfusion, with no core. CTA showed possible distal M1 on the Rt MCA.   The patient will undergo thrombectomy     - continue aspirin 81mg daily   - continue atorvastatin 80mg daily   - q2hr stroke neuro checks and vitals   - TTE with bubble pending  - JOEY pending.   - b/l LE US 2/26 - negative for DVT    Cardiology    #HTN   - Goal SBP <180  - continue metoprolol succinate ER 25mg QD      #Heart failure   EF 20%   - Avoid fluid overload    - Start Entresto 24-26 after fluid rehydration   - I/Os    - CT surgery consulted, appreciate recommendations    #HLD   - high dose statin as above in CVA   - LDL results: 90     Pulm   No active issues  - call provider if SPO2 < 94%     GI     # Stomach mass   - GI will follow as outpatient, as per GI: the lesion is very unlikely to be a malignant lesion, more as over read     #Nutrition/Fluids/Electrolytes   Hypernatremia is better today    - replete K<4 and Mg <2   - Diet: DASH/TLC    - IVF: He received 500ml of 1/2 NS over 10h  - Continue to encourage oral water intake       Renal  - Daily BMP  - US Retroperitoneal Complete ordered - renal lesion seen on previous CT A/P    #bladder mass  - urine cytology pending   - maintain evans, will be discharged with as per   -  following, appreciate recs        Infectious Disease   No active issue      Endocrine   A1c 5.4  TSH 7.64, formal FT4    DVT Prophylaxis   - heparin x sq  - SCDs for DVT prophylaxis      IDR Goals: Goals reviewed at interdisciplinary rounds with case management, social work, physical therapy, occupational therapy, and speech language pathology.   Please see specific therapy  notes for in depth goals.  Dispo: pending PT/OT eval     Discussed daily hospital plans and goals with patient and have updated patient's roommate. Palliative and ethics are being consulted given patient's lack of family and patient unable to make healthcare decisions for self at this time.     Discussed with Neurology Attending Dr. Nazia Joiner

## 2022-03-03 NOTE — PROGRESS NOTE ADULT - SUBJECTIVE AND OBJECTIVE BOX
O: s/p thrombectomy of right M1 TICI 3     T(C): 36.1 (03-03-22 @ 17:20), Max: 37.4 (03-02-22 @ 22:42)  HR: 80 (03-03-22 @ 19:00) (78 - 108)  BP: 119/75 (03-03-22 @ 19:00) (119/75 - 194/94)  RR: 34 (03-03-22 @ 19:00) (11 - 34)  SpO2: 100% (03-03-22 @ 19:00) (98% - 100%)  03-02-22 @ 07:01  -  03-03-22 @ 07:00  --------------------------------------------------------  IN: 550 mL / OUT: 700 mL / NET: -150 mL    03-03-22 @ 07:01  -  03-03-22 @ 19:52  --------------------------------------------------------  IN: 250 mL / OUT: 490 mL / NET: -240 mL    acetaminophen    Suspension .. 650 milliGRAM(s) Oral every 6 hours PRN  atorvastatin 80 milliGRAM(s) Oral at bedtime  metoprolol succinate ER 25 milliGRAM(s) Oral daily  pantoprazole    Tablet 40 milliGRAM(s) Oral before breakfast  polyethylene glycol 3350 17 Gram(s) Oral daily PRN  senna 2 Tablet(s) Oral at bedtime    NEUROLOGIC EXAMINATION:  Patient    GENERAL: not intubated, not in cardiorespiratory distress  EENT:  anicteric  CARDIOVASCULAR: (+) S1 S2, normal rate and regular rhythm  PULMONARY: clear to auscultation bilaterally  ABDOMEN: soft, nontender with normoactive bowel sounds  EXTREMITIES: no edema  SKIN: no rash    LABS:  Na: 150 (03-03 @ 12:55), 150 (03-02 @ 18:40), 152 (03-02 @ 06:03), 151 (03-01 @ 06:48)  K: 3.8 (03-03 @ 12:55), 3.9 (03-02 @ 18:40), 3.8 (03-02 @ 06:03), see note (03-01 @ 06:48)  Cl: 115 (03-03 @ 12:55), 116 (03-02 @ 18:40), 118 (03-02 @ 06:03), 117 (03-01 @ 06:48)  CO2: 21 (03-03 @ 12:55), 24 (03-02 @ 18:40), 21 (03-02 @ 06:03), 20 (03-01 @ 06:48)  BUN: 24 (03-03 @ 12:55), 30 (03-02 @ 18:40), 30 (03-02 @ 06:03), 28 (03-01 @ 06:48)  Cr: 0.87 (03-03 @ 12:55), 0.89 (03-02 @ 18:40), 0.91 (03-02 @ 06:03), 0.79 (03-01 @ 06:48)  Glu: 87(03-03 @ 12:55), 101(03-02 @ 18:40), 85(03-02 @ 06:03), 98(03-01 @ 06:48)    Hgb: 14.0 (03-03 @ 12:55), 12.1 (03-02 @ 06:03), 12.4 (03-01 @ 06:48)  Hct: 44.4 (03-03 @ 12:55), 40.0 (03-02 @ 06:03), 40.7 (03-01 @ 06:48)  WBC: 5.06 (03-03 @ 12:55), 4.89 (03-02 @ 06:03), 6.22 (03-01 @ 06:48)  Plt: 251 (03-03 @ 12:55), 223 (03-02 @ 06:03), 232 (03-01 @ 06:48)    INR: 1.05 03-03-22 @ 12:55  PTT: 31.4 03-03-22 @ 12:55            a/p right MCA ishcmeic stroke with right M1 occlusion s/p thrombectomy TICI 3   CT head with right MCA ischemic stroke with contrast extravasation and left frontal love  likely cardioembolic  neuro checks q 1 hr  will need to be on aspirin vs anticoagulation, will get CT head before starting antiplts given contrast extravasation   lipid profile, HBA 1 c  MRI brain  CT head tomorrow   CV : SBP goal   100-160 mmhg   CAD s/p bypass  on aspirin at [facility]   Acute on chronic combined systolic and diastolic congestive heart failure, EF 20 % ,  on lasix 10 mg at home   metoprolol 25 mg BID   pulse check, groin check  TTE pending   Pulm: RA  GI: advance diet as tolerated   GI ulceration vs antral mass - plan for EGD/EUS w GI on Na improves and Neurological status Improves   Renal: NS 75 ml/hr , IVL    Bladder mass Urology following and recommended MRI vs CT renal protocol. UA w cytology for possible malignancy. Recommended for outpatient cystoscopy  ID afebrile  Endo: target sugar 120-180   Hem: SCD,   hold chemoprophylaxis  as POD 0     30 critical care time as patient is at risk for reperfusion injury , seizures, ICH  O: s/p thrombectomy of right M1 TICI 3     T(C): 36.1 (03-03-22 @ 17:20), Max: 37.4 (03-02-22 @ 22:42)  HR: 80 (03-03-22 @ 19:00) (78 - 108)  BP: 119/75 (03-03-22 @ 19:00) (119/75 - 194/94)  RR: 34 (03-03-22 @ 19:00) (11 - 34)  SpO2: 100% (03-03-22 @ 19:00) (98% - 100%)  03-02-22 @ 07:01  -  03-03-22 @ 07:00  --------------------------------------------------------  IN: 550 mL / OUT: 700 mL / NET: -150 mL    03-03-22 @ 07:01  -  03-03-22 @ 19:52  --------------------------------------------------------  IN: 250 mL / OUT: 490 mL / NET: -240 mL    acetaminophen    Suspension .. 650 milliGRAM(s) Oral every 6 hours PRN  atorvastatin 80 milliGRAM(s) Oral at bedtime  metoprolol succinate ER 25 milliGRAM(s) Oral daily  pantoprazole    Tablet 40 milliGRAM(s) Oral before breakfast  polyethylene glycol 3350 17 Gram(s) Oral daily PRN  senna 2 Tablet(s) Oral at bedtime    NEUROLOGIC EXAMINATION:  not opening eyes to verbal stimuli, follows simple commands PARVIZ, right gaze preference right side spontaneous at least antigravity, left UE withdraws, LLE triple flexion   GENERAL: not intubated, not in cardiorespiratory distress  EENT:  anicteric  CARDIOVASCULAR: (+) S1 S2, normal rate and regular rhythm  PULMONARY: clear to auscultation bilaterally  ABDOMEN: soft, nontender with normoactive bowel sounds  EXTREMITIES: no edema  SKIN: no rash    LABS:  Na: 150 (03-03 @ 12:55), 150 (03-02 @ 18:40), 152 (03-02 @ 06:03), 151 (03-01 @ 06:48)  K: 3.8 (03-03 @ 12:55), 3.9 (03-02 @ 18:40), 3.8 (03-02 @ 06:03), see note (03-01 @ 06:48)  Cl: 115 (03-03 @ 12:55), 116 (03-02 @ 18:40), 118 (03-02 @ 06:03), 117 (03-01 @ 06:48)  CO2: 21 (03-03 @ 12:55), 24 (03-02 @ 18:40), 21 (03-02 @ 06:03), 20 (03-01 @ 06:48)  BUN: 24 (03-03 @ 12:55), 30 (03-02 @ 18:40), 30 (03-02 @ 06:03), 28 (03-01 @ 06:48)  Cr: 0.87 (03-03 @ 12:55), 0.89 (03-02 @ 18:40), 0.91 (03-02 @ 06:03), 0.79 (03-01 @ 06:48)  Glu: 87(03-03 @ 12:55), 101(03-02 @ 18:40), 85(03-02 @ 06:03), 98(03-01 @ 06:48)    Hgb: 14.0 (03-03 @ 12:55), 12.1 (03-02 @ 06:03), 12.4 (03-01 @ 06:48)  Hct: 44.4 (03-03 @ 12:55), 40.0 (03-02 @ 06:03), 40.7 (03-01 @ 06:48)  WBC: 5.06 (03-03 @ 12:55), 4.89 (03-02 @ 06:03), 6.22 (03-01 @ 06:48)  Plt: 251 (03-03 @ 12:55), 223 (03-02 @ 06:03), 232 (03-01 @ 06:48)    INR: 1.05 03-03-22 @ 12:55  PTT: 31.4 03-03-22 @ 12:55            a/p right MCA ishcmeic stroke with right M1 occlusion s/p thrombectomy TICI 3   CT head with right MCA ischemic stroke with contrast extravasation and left frontal love  likely cardioembolic  neuro checks q 1 hr  will need to be on aspirin vs anticoagulation, will get CT head before starting antiplts given contrast extravasation   lipid profile, HBA 1 c  MRI brain  CT head tomorrow   CV : SBP goal   100-160 mmhg   CAD s/p bypass  on aspirin at [facility]   Acute on chronic combined systolic and diastolic congestive heart failure, EF 20 % ,  on lasix 10 mg at home   metoprolol 25 mg BID   pulse check, groin check  TTE pending   Pulm: RA  GI: advance diet as tolerated   GI ulceration vs antral mass - plan for EGD/EUS w GI on Na improves and Neurological status Improves   Renal: NS 75 ml/hr , IVL    Bladder mass Urology following and recommended MRI vs CT renal protocol. UA w cytology for possible malignancy. Recommended for outpatient cystoscopy  ID afebrile  Endo: target sugar 120-180   Hem: SCD,   hold chemoprophylaxis  as POD 0     30 critical care time as patient is at risk for reperfusion injury , seizures, ICH  O: s/p thrombectomy of right M1 TICI 3     T(C): 36.1 (03-03-22 @ 17:20), Max: 37.4 (03-02-22 @ 22:42)  HR: 80 (03-03-22 @ 19:00) (78 - 108)  BP: 119/75 (03-03-22 @ 19:00) (119/75 - 194/94)  RR: 34 (03-03-22 @ 19:00) (11 - 34)  SpO2: 100% (03-03-22 @ 19:00) (98% - 100%)  03-02-22 @ 07:01  -  03-03-22 @ 07:00  --------------------------------------------------------  IN: 550 mL / OUT: 700 mL / NET: -150 mL    03-03-22 @ 07:01  -  03-03-22 @ 19:52  --------------------------------------------------------  IN: 250 mL / OUT: 490 mL / NET: -240 mL    acetaminophen    Suspension .. 650 milliGRAM(s) Oral every 6 hours PRN  atorvastatin 80 milliGRAM(s) Oral at bedtime  metoprolol succinate ER 25 milliGRAM(s) Oral daily  pantoprazole    Tablet 40 milliGRAM(s) Oral before breakfast  polyethylene glycol 3350 17 Gram(s) Oral daily PRN  senna 2 Tablet(s) Oral at bedtime    NEUROLOGIC EXAMINATION:  not opening eyes to verbal stimuli, follows simple commands PARVIZ, right gaze preference right side spontaneous at least antigravity, left UE withdraws, LLE triple flexion   GENERAL: not intubated, not in cardiorespiratory distress  EENT:  anicteric  CARDIOVASCULAR: (+) S1 S2, normal rate and regular rhythm  PULMONARY: clear to auscultation bilaterally  ABDOMEN: soft, nontender with normoactive bowel sounds  EXTREMITIES: no edema, no groin edema, nl DP pulses   SKIN: no rash    LABS:  Na: 150 (03-03 @ 12:55), 150 (03-02 @ 18:40), 152 (03-02 @ 06:03), 151 (03-01 @ 06:48)  K: 3.8 (03-03 @ 12:55), 3.9 (03-02 @ 18:40), 3.8 (03-02 @ 06:03), see note (03-01 @ 06:48)  Cl: 115 (03-03 @ 12:55), 116 (03-02 @ 18:40), 118 (03-02 @ 06:03), 117 (03-01 @ 06:48)  CO2: 21 (03-03 @ 12:55), 24 (03-02 @ 18:40), 21 (03-02 @ 06:03), 20 (03-01 @ 06:48)  BUN: 24 (03-03 @ 12:55), 30 (03-02 @ 18:40), 30 (03-02 @ 06:03), 28 (03-01 @ 06:48)  Cr: 0.87 (03-03 @ 12:55), 0.89 (03-02 @ 18:40), 0.91 (03-02 @ 06:03), 0.79 (03-01 @ 06:48)  Glu: 87(03-03 @ 12:55), 101(03-02 @ 18:40), 85(03-02 @ 06:03), 98(03-01 @ 06:48)    Hgb: 14.0 (03-03 @ 12:55), 12.1 (03-02 @ 06:03), 12.4 (03-01 @ 06:48)  Hct: 44.4 (03-03 @ 12:55), 40.0 (03-02 @ 06:03), 40.7 (03-01 @ 06:48)  WBC: 5.06 (03-03 @ 12:55), 4.89 (03-02 @ 06:03), 6.22 (03-01 @ 06:48)  Plt: 251 (03-03 @ 12:55), 223 (03-02 @ 06:03), 232 (03-01 @ 06:48)    INR: 1.05 03-03-22 @ 12:55  PTT: 31.4 03-03-22 @ 12:55            a/p right MCA ishcmeic stroke with right M1 occlusion s/p thrombectomy TICI 3   CT head with right MCA ischemic stroke with contrast extravasation and left frontal love  likely cardioembolic  neuro checks q 1 hr  aspirin 81 mg cleared by NS and stroke   atorvastatin 80   MRI brain  CT head tomorrow   CV : SBP goal   120-150 mmhg   CAD s/p bypass  on aspirin at [facility]   Acute on chronic combined systolic and diastolic congestive heart failure, EF 20 % ,  on  lasix 10 mg at home , keep on hold   metoprolol 12.5 mg BID   pulse check, groin check  TTE pending   Pulm: RA  GI:NG, start TF   GI ulceration vs antral mass - plan for EGD/EUS w GI on Na improves and Neurological status Improves   pantoprazole 40 mg   Renal: IVL    Bladder mass Urology following and recommended MRI vs CT renal protocol. UA w cytology for possible malignancy. Recommended for outpatient cystoscopy  ID afebrile  Endo: target sugar 120-180   Hem: SCD,   lovenox 40 mg start tomorrow if CT head stable     30 critical care time as patient is at risk for reperfusion injury , seizures, ICH  O: s/p thrombectomy of right M1 TICI 3     T(C): 36.1 (03-03-22 @ 17:20), Max: 37.4 (03-02-22 @ 22:42)  HR: 80 (03-03-22 @ 19:00) (78 - 108)  BP: 119/75 (03-03-22 @ 19:00) (119/75 - 194/94)  RR: 34 (03-03-22 @ 19:00) (11 - 34)  SpO2: 100% (03-03-22 @ 19:00) (98% - 100%)  03-02-22 @ 07:01  -  03-03-22 @ 07:00  --------------------------------------------------------  IN: 550 mL / OUT: 700 mL / NET: -150 mL    03-03-22 @ 07:01  -  03-03-22 @ 19:52  --------------------------------------------------------  IN: 250 mL / OUT: 490 mL / NET: -240 mL    acetaminophen    Suspension .. 650 milliGRAM(s) Oral every 6 hours PRN  atorvastatin 80 milliGRAM(s) Oral at bedtime  metoprolol succinate ER 25 milliGRAM(s) Oral daily  pantoprazole    Tablet 40 milliGRAM(s) Oral before breakfast  polyethylene glycol 3350 17 Gram(s) Oral daily PRN  senna 2 Tablet(s) Oral at bedtime    NEUROLOGIC EXAMINATION:  not opening eyes to verbal stimuli, follows simple commands PARVIZ, right gaze preference right side spontaneous at least antigravity, left UE withdraws, LLE triple flexion   GENERAL: not intubated, not in cardiorespiratory distress  EENT:  anicteric  CARDIOVASCULAR: (+) S1 S2, normal rate and regular rhythm  PULMONARY: clear to auscultation bilaterally  ABDOMEN: soft, nontender with normoactive bowel sounds  EXTREMITIES: no edema, no groin edema, nl DP pulses   SKIN: no rash    LABS:  Na: 150 (03-03 @ 12:55), 150 (03-02 @ 18:40), 152 (03-02 @ 06:03), 151 (03-01 @ 06:48)  K: 3.8 (03-03 @ 12:55), 3.9 (03-02 @ 18:40), 3.8 (03-02 @ 06:03), see note (03-01 @ 06:48)  Cl: 115 (03-03 @ 12:55), 116 (03-02 @ 18:40), 118 (03-02 @ 06:03), 117 (03-01 @ 06:48)  CO2: 21 (03-03 @ 12:55), 24 (03-02 @ 18:40), 21 (03-02 @ 06:03), 20 (03-01 @ 06:48)  BUN: 24 (03-03 @ 12:55), 30 (03-02 @ 18:40), 30 (03-02 @ 06:03), 28 (03-01 @ 06:48)  Cr: 0.87 (03-03 @ 12:55), 0.89 (03-02 @ 18:40), 0.91 (03-02 @ 06:03), 0.79 (03-01 @ 06:48)  Glu: 87(03-03 @ 12:55), 101(03-02 @ 18:40), 85(03-02 @ 06:03), 98(03-01 @ 06:48)    Hgb: 14.0 (03-03 @ 12:55), 12.1 (03-02 @ 06:03), 12.4 (03-01 @ 06:48)  Hct: 44.4 (03-03 @ 12:55), 40.0 (03-02 @ 06:03), 40.7 (03-01 @ 06:48)  WBC: 5.06 (03-03 @ 12:55), 4.89 (03-02 @ 06:03), 6.22 (03-01 @ 06:48)  Plt: 251 (03-03 @ 12:55), 223 (03-02 @ 06:03), 232 (03-01 @ 06:48)    INR: 1.05 03-03-22 @ 12:55  PTT: 31.4 03-03-22 @ 12:55            a/p right MCA ishcmeic stroke with right M1 occlusion s/p thrombectomy TICI 3   CT head with right MCA ischemic stroke with contrast extravasation and left frontal love  likely cardioembolic  neuro checks q 1 hr  aspirin 81 mg cleared by NS and stroke   atorvastatin 80   MRI brain  CT head tomorrow   CV : SBP goal   120-150 mmhg   CAD s/p bypass  on aspirin at [facility]   Acute on chronic combined systolic and diastolic congestive heart failure, EF 20 % ,  on  lasix 10 mg at home , keep on hold   metoprolol 12.5 mg BID   pulse check, groin check  TTE pending   Pulm: RA  GI:NG, start TF   GI ulceration vs antral mass - plan for EGD/EUS w GI on Na improves and Neurological status Improves   pantoprazole 40 mg   Renal: IVL   hypernatremia, start free water flushes 200 ml q 8 hr    Bladder mass Urology following and recommended MRI vs CT renal protocol. UA w cytology for possible malignancy. Recommended for outpatient cystoscopy  ID afebrile  Endo: target sugar 120-180   Hem: SCD,   lovenox 40 mg start tomorrow if CT head stable     30 critical care time as patient is at risk for reperfusion injury , seizures, ICH

## 2022-03-03 NOTE — PROGRESS NOTE ADULT - SUBJECTIVE AND OBJECTIVE BOX
=================================  NEUROCRITICAL CARE ATTENDING NOTE  =================================    RAMSEY ROSENBERG   MRN-[id number]  Summary:  71y/M [place] speaking male, smoker (very recently quit, smoked x 40 years) with PMHx of CHF (EF 25-30%) and prediabetes (A1c 5.9) who is now s/p robotic MIDCAB with Dr. Santos on 02/09/2022.  Post OR he was extubated on POD 0, started on beta blockers by POD 2 and transferred to telemetry.  By POD 4 he was ambulating independently, on room air, having normal BM's, tolerating PO diet with surgical pain under control. He was discharged home on POD 4.  On 02/20/2022 patient presented to OSH with AMS. Work up included CT without, CTA Head and Neck and MRI Jerry showing acute MCA CVA, b/l acute inracts  with the largest in the right basal ganglia and insular region. EEG negative for seizure. TTE = ? PFO. Patient transfer to [facility] for further MGMT and work up.  (02/23/2022      COURSE IN THE HOSPITAL:    Past Medical History: Acute on chronic combined systolic and diastolic congestive heart failure Prediabetes Smoking hx  Allergies:  No Known Allergies  Home meds:   ·	acetaminophen 325 mg oral tablet: 2 tab(s) orally every 6 hours, As needed, Mild Pain (1 - 3)  ·	aspirin 81 mg oral tablet, chewable: 1 tab(s) orally once a day  ·	atorvastatin 80 mg oral tablet: 1 tab(s) orally once a day (at bedtime)  ·	furosemide 20 mg oral tablet: 1 tab(s) orally once a day  ·	metoprolol tartrate 25 mg oral tablet: 1 tab(s) orally every 12 hours   ·	pantoprazole 40 mg oral delayed release tablet: 1 tab(s) orally once a day (before a meal)  ·	polyethylene glycol 3350 oral powder for reconstitution: 17 gram(s) orally once a day  ·	senna oral tablet: 2 tab(s) orally once a day (at bedtime)    PHYSICAL EXAMINATION  T(C): 36.7 (03-03 @ 11:45), Max: 37.4 (03-02 @ 22:42) HR: 86 (03-03 @ 14:00) (78 - 108) BP: 133/93 (03-03 @ 14:00) (121/81 - 194/94) RR: 11 (03-03 @ 14:00) (11 - 25) SpO2: 100% (03-03 @ 14:00) (98% - 100%)  NEUROLOGIC EXAMINATION:  Patient    GENERAL: not intubated, not in cardiorespiratory distress  EENT:  anicteric  CARDIOVASCULAR: (+) S1 S2, normal rate and regular rhythm  PULMONARY: clear to auscultation bilaterally  ABDOMEN: soft, nontender with normoactive bowel sounds  EXTREMITIES: no edema  SKIN: no rash    LABS:  CAPILLARY BLOOD GLUCOSE 72                       14.0   5.06  )-----------( 251      ( 03 Mar 2022 12:55 )             44.4     150<H>  |  115<H>  |  24<H>  ----------------------------<  87  3.8   |  21<L>  |  0.87    Ca    9.4      03 Mar 2022 12:55  Phos  3.5     03-03  Mg     2.5     03-03    TPro  6.6  /  Alb  3.3  /  TBili  0.4  /  DBili  x   /  AST  30  /  ALT  19  /  AlkPhos  65  03-02    03-02 @ 07:01  -  03-03 @ 07:00  IN: 550 mL / OUT: 700 mL / NET: -150 mL    HbA1C = 5.4 (02-08)  LDL = 90 (02-08)   HDL = 67 (02-08)  TG = 64 (02-08)   TSH = 7.640 (02-08)    Bacteriology:  CSF studies:  EEG:  Neuroimaging:  Other imaging:    MEDICATIONS:  ·	QUEtiapine 12.5 milliGRAM(s) Oral at bedtime  ·	metoprolol succinate ER 25 milliGRAM(s) Oral daily  ·	pantoprazole    Tablet 40 milliGRAM(s) Oral before breakfast  ·	polyethylene glycol 3350 17 Gram(s) Oral daily PRN  ·	senna 2 Tablet(s) Oral at bedtime  ·	atorvastatin 80 milliGRAM(s) Oral at bedtime    IV FLUIDS:  DRIPS:  DIET:  Lines:  Drains:    03-02-22 @ 07:01  -  03-03-22 @ 07:00  --------------------------------------------------------  OUT:    Indwelling Catheter - Urethral (mL): 700 mL  Total OUT: 700 mL        Wounds:    CODE STATUS:  Full Code                       GOALS OF CARE:  aggressive                      DISPOSITION:  ICU =================================  NEUROCRITICAL CARE ATTENDING NOTE  =================================    RAMSEY ROSENBERG   MRN-[id number]  Summary:  71y/M Turk[place] speaking male, smoker (very recently quit, smoked x 40 years) with PMHx of CHF (EF 25-30%) and prediabetes (A1c 5.9) who is now s/p robotic MIDCAB with Dr. Santos on 02/09/2022.  Post OR he was extubated on POD 0, started on beta blockers by POD 2 and transferred to telemetry.  By POD 4 he was ambulating independently, on room air, having normal BM's, tolerating PO diet with surgical pain under control. He was discharged home on POD 4.  On 02/20/2022 patient presented to OSH with AMS. Work up included CT without, CTA Head and Neck and MRI Jerry showing acute MCA CVA, b/l acute inracts  with the largest in the right basal ganglia and insular region. EEG negative for seizure. TTE = ? PFO. Patient transfer to [facility] for further MGMT and work up.  (02/23/2022    COURSE IN THE HOSPITAL:  02/23 admitted to [facility]  03/03 s/p thrombectomy    Past Medical History: Acute on chronic combined systolic and diastolic congestive heart failure Prediabetes Smoking hx  Allergies:  No Known Allergies  Home meds:   ·	acetaminophen 325 mg oral tablet: 2 tab(s) orally every 6 hours, As needed, Mild Pain (1 - 3)  ·	aspirin 81 mg oral tablet, chewable: 1 tab(s) orally once a day  ·	atorvastatin 80 mg oral tablet: 1 tab(s) orally once a day (at bedtime)  ·	furosemide 20 mg oral tablet: 1 tab(s) orally once a day  ·	metoprolol tartrate 25 mg oral tablet: 1 tab(s) orally every 12 hours   ·	pantoprazole 40 mg oral delayed release tablet: 1 tab(s) orally once a day (before a meal)  ·	polyethylene glycol 3350 oral powder for reconstitution: 17 gram(s) orally once a day  ·	senna oral tablet: 2 tab(s) orally once a day (at bedtime)    PHYSICAL EXAMINATION  T(C): 36.7 (03-03 @ 11:45), Max: 37.4 (03-02 @ 22:42) HR: 86 (03-03 @ 14:00) (78 - 108) BP: 133/93 (03-03 @ 14:00) (121/81 - 194/94) RR: 11 (03-03 @ 14:00) (11 - 25) SpO2: 100% (03-03 @ 14:00) (98% - 100%)  NEUROLOGIC EXAMINATION:  Patient    GENERAL: not intubated, not in cardiorespiratory distress  EENT:  anicteric  CARDIOVASCULAR: (+) S1 S2, normal rate and regular rhythm  PULMONARY: clear to auscultation bilaterally  ABDOMEN: soft, nontender with normoactive bowel sounds  EXTREMITIES: no edema  SKIN: no rash    LABS:  CAPILLARY BLOOD GLUCOSE 72                       14.0   5.06  )-----------( 251      ( 03 Mar 2022 12:55 )             44.4     150<H>  |  115<H>  |  24<H>  ----------------------------<  87  3.8   |  21<L>  |  0.87    Ca    9.4      03 Mar 2022 12:55  Phos  3.5     03-03  Mg     2.5     03-03    TPro  6.6  /  Alb  3.3  /  TBili  0.4  /  DBili  x   /  AST  30  /  ALT  19  /  AlkPhos  65  03-02 03-02 @ 07:01  -  03-03 @ 07:00  IN: 550 mL / OUT: 700 mL / NET: -150 mL    HbA1C = 5.4 (02-08)  LDL = 90 (02-08)   HDL = 67 (02-08)  TG = 64 (02-08)   TSH = 7.640 (02-08)    Bacteriology:  CSF studies:  EEG:  Neuroimaging:  Other imaging:    MEDICATIONS:  ·	QUEtiapine 12.5 milliGRAM(s) Oral at bedtime  ·	metoprolol succinate ER 25 milliGRAM(s) Oral daily  ·	pantoprazole    Tablet 40 milliGRAM(s) Oral before breakfast  ·	polyethylene glycol 3350 17 Gram(s) Oral daily PRN  ·	senna 2 Tablet(s) Oral at bedtime  ·	atorvastatin 80 milliGRAM(s) Oral at bedtime    IV FLUIDS:  DRIPS:  DIET:  Lines:  Drains:    03-02-22 @ 07:01  -  03-03-22 @ 07:00  --------------------------------------------------------  OUT:    Indwelling Catheter - Urethral (mL): 700 mL  Total OUT: 700 mL        Wounds:    CODE STATUS:  Full Code                       GOALS OF CARE:  aggressive                      DISPOSITION:  ICU  NIHSS 22

## 2022-03-03 NOTE — PROGRESS NOTE ADULT - SUBJECTIVE AND OBJECTIVE BOX
NEUROSURGERY POST OP NOTE:    POD# 0 S/P right M1 thrombectomy    S: Patient nonverbal, able to follow commands in RUE and squeeze to command. RUE/LE moves spontaneously antigravity, LUE/LLE WD to pain.       T(C): 36.7 (03-03-22 @ 14:33), Max: 37.4 (03-02-22 @ 22:42)  HR: 86 (03-03-22 @ 14:00) (78 - 108)  BP: 133/93 (03-03-22 @ 14:00) (129/76 - 194/94)  RR: 11 (03-03-22 @ 14:00) (11 - 25)  SpO2: 100% (03-03-22 @ 14:00) (98% - 100%)      03-02-22 @ 07:01  -  03-03-22 @ 07:00  --------------------------------------------------------  IN: 550 mL / OUT: 700 mL / NET: -150 mL        atorvastatin 80 milliGRAM(s) Oral at bedtime  metoprolol succinate ER 25 milliGRAM(s) Oral daily  pantoprazole    Tablet 40 milliGRAM(s) Oral before breakfast  polyethylene glycol 3350 17 Gram(s) Oral daily PRN  senna 2 Tablet(s) Oral at bedtime      RADIOLOGY:   < from: CT Angio Neck Stroke Protocol IV Cont (03.03.22 @ 08:57) >  IMPRESSION:    CTA NECK: 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 Multifocal severe   and moderate narrowing of the left proximal subclavian artery. Mild   narrowing at the origin of the cervical ICAs.    CTA HEAD: Tapering to complete occlusion of the right distal M1 segment   at the right MCA bifurcation. Mild to moderate narrowing of the right   distal petrous and cavernous ICA. Moderate narrowing of the left   cavernous and supraclinoid ICA.    Evolvingsubacute infarcts in the right frontoparietal operculum and   right insular cortex (right MCA vascular territory). MRI brain may be   obtained for further assessment.    < from: CT Perfusion w/ Maps w/ IV Cont (03.03.22 @ 08:57) >  IMPRESSION:  Positive CT perfusion. There is elevated transit time with mismatch in   cerebral blood flow at the right MCA territory.    Exam:  General: NAD, appears cachectic, pt is comfortably lying in bed, on room air  HEENT: EOMI b/l, face symmetric, tongue midline, neck FROM  Cardiovascular: RRR  Respiratory: lungs CTAB, no wheezing, rhonchi, or crackles   GI: abdomen soft  Neuro: CN II-XII grossly intact, PERRL 3mm briskly reactive  A&Ox3, No aphasia, speech clear, no dysmetria, no pronator drift. Follows commands.  able to FC on RUE, squeezes hand to command, RLE moves spontaneously antigravity  LUE/LLE WD to pain   Extremities: distal pulses 2+ x4  Wound/incision: R groin dressing      Assessment:  71M Mongolian speaking smoker (very recently quit, smoked x 40 years) with PMHx of CHF (EF 25-30%), prediabetes, s/p robotic MIDCAB with Dr. Santos on 02/09/2022 transferred from OSH on 2/20/2022 presenting with AMS, MRI revealed acute MCA CVA, b/l acute infarcts largest in R basal ganglia and insular region. Transferred to [facility] for further workup. Currently s/p R M1 thrombectomy 3/3/22.         Plan:  NEURO  - neuro checks/vital signs q1hr  - pend post thrombectomy CTH 3/3  - holding ASA   - groin checks   - bed rest x6hrs   - pend PT/OT    CARDIO  - -150  - echo 2/24: EF 20%  - pend TTE  - continue home metoprolol    PULM  - on RA  - no issues     GI/:  - NPO   - GI will follow outpatient for stomach mass  -  following for bladder mass  - evans   - bowel regimen   - pend NGT placement  - cont home med protonix    RENAL  - Na goal 135-145  - 1/2 NS @50 dc'd    ENDO  - A1C: 5.4  - no issues    ID  - no issues    HEME:  - SCDS for DVT ppx  - no chemoprophylaxis at this time     Dispo: ICU status, roomate updated, dispo pending     D/w Dr. Garcia and Dr. Arthur

## 2022-03-03 NOTE — BRIEF OPERATIVE NOTE - OPERATION/FINDINGS
Patient was brought to the angio suite, placed on x-ray table, placed on standard monitor by anesthesiologist. B/l groins were prepped and draped in usual sterile fashion.   6 [place] long sheath was used in the right common femoral artery for access. A diagnostic angiogram was performed under monitored anesthesia care. Right ICA injected and studied.     Findings:   There was opacification at right M1 suggestive of clot burden. Stent triever deployed into clot on suction. Clot burden removed.   Repeat right ICA run showing no opacification and complete filling of right M1   TICI 1 to TICI 3.     Full report to follow  Patient tolerated the procedure well and at baseline neurological condition.   Right groin vascular access site was closed with perclose and applied manual compression.   There is no hematoma.   Patient was transferred to NSICU    Above discussed with Dr. Garcia.

## 2022-03-04 LAB
ANION GAP SERPL CALC-SCNC: 14 MMOL/L — SIGNIFICANT CHANGE UP (ref 5–17)
BUN SERPL-MCNC: 26 MG/DL — HIGH (ref 7–23)
CALCIUM SERPL-MCNC: 9.5 MG/DL — SIGNIFICANT CHANGE UP (ref 8.4–10.5)
CHLORIDE SERPL-SCNC: 115 MMOL/L — HIGH (ref 96–108)
CO2 SERPL-SCNC: 22 MMOL/L — SIGNIFICANT CHANGE UP (ref 22–31)
CREAT SERPL-MCNC: 1 MG/DL — SIGNIFICANT CHANGE UP (ref 0.5–1.3)
EGFR: 81 ML/MIN/1.73M2 — SIGNIFICANT CHANGE UP
GLUCOSE SERPL-MCNC: 107 MG/DL — HIGH (ref 70–99)
HCT VFR BLD CALC: 40.3 % — SIGNIFICANT CHANGE UP (ref 39–50)
HGB BLD-MCNC: 12.5 G/DL — LOW (ref 13–17)
MAGNESIUM SERPL-MCNC: 2.5 MG/DL — SIGNIFICANT CHANGE UP (ref 1.6–2.6)
MCHC RBC-ENTMCNC: 30.3 PG — SIGNIFICANT CHANGE UP (ref 27–34)
MCHC RBC-ENTMCNC: 31 GM/DL — LOW (ref 32–36)
MCV RBC AUTO: 97.8 FL — SIGNIFICANT CHANGE UP (ref 80–100)
NRBC # BLD: 0 /100 WBCS — SIGNIFICANT CHANGE UP (ref 0–0)
PHOSPHATE SERPL-MCNC: 4.2 MG/DL — SIGNIFICANT CHANGE UP (ref 2.5–4.5)
PLATELET # BLD AUTO: 217 K/UL — SIGNIFICANT CHANGE UP (ref 150–400)
POTASSIUM SERPL-MCNC: 4 MMOL/L — SIGNIFICANT CHANGE UP (ref 3.5–5.3)
POTASSIUM SERPL-SCNC: 4 MMOL/L — SIGNIFICANT CHANGE UP (ref 3.5–5.3)
RBC # BLD: 4.12 M/UL — LOW (ref 4.2–5.8)
RBC # FLD: 14.8 % — HIGH (ref 10.3–14.5)
SODIUM SERPL-SCNC: 151 MMOL/L — HIGH (ref 135–145)
WBC # BLD: 6.85 K/UL — SIGNIFICANT CHANGE UP (ref 3.8–10.5)
WBC # FLD AUTO: 6.85 K/UL — SIGNIFICANT CHANGE UP (ref 3.8–10.5)

## 2022-03-04 PROCEDURE — 99498 ADVNCD CARE PLAN ADDL 30 MIN: CPT | Mod: 25

## 2022-03-04 PROCEDURE — 99233 SBSQ HOSP IP/OBS HIGH 50: CPT

## 2022-03-04 PROCEDURE — 99232 SBSQ HOSP IP/OBS MODERATE 35: CPT

## 2022-03-04 PROCEDURE — 93308 TTE F-UP OR LMTD: CPT | Mod: 26

## 2022-03-04 PROCEDURE — 99497 ADVNCD CARE PLAN 30 MIN: CPT | Mod: 25

## 2022-03-04 RX ORDER — POLYETHYLENE GLYCOL 3350 17 G/17G
17 POWDER, FOR SOLUTION ORAL DAILY
Refills: 0 | Status: DISCONTINUED | OUTPATIENT
Start: 2022-03-04 | End: 2022-03-07

## 2022-03-04 RX ORDER — SODIUM CHLORIDE 9 MG/ML
250 INJECTION INTRAMUSCULAR; INTRAVENOUS; SUBCUTANEOUS ONCE
Refills: 0 | Status: COMPLETED | OUTPATIENT
Start: 2022-03-04 | End: 2022-03-04

## 2022-03-04 RX ORDER — SODIUM CHLORIDE 9 MG/ML
250 INJECTION INTRAMUSCULAR; INTRAVENOUS; SUBCUTANEOUS ONCE
Refills: 0 | Status: DISCONTINUED | OUTPATIENT
Start: 2022-03-04 | End: 2022-03-04

## 2022-03-04 RX ORDER — ENOXAPARIN SODIUM 100 MG/ML
30 INJECTION SUBCUTANEOUS EVERY 24 HOURS
Refills: 0 | Status: DISCONTINUED | OUTPATIENT
Start: 2022-03-04 | End: 2022-03-07

## 2022-03-04 RX ORDER — ENOXAPARIN SODIUM 100 MG/ML
40 INJECTION SUBCUTANEOUS EVERY 24 HOURS
Refills: 0 | Status: DISCONTINUED | OUTPATIENT
Start: 2022-03-04 | End: 2022-03-04

## 2022-03-04 RX ADMIN — SENNA PLUS 2 TABLET(S): 8.6 TABLET ORAL at 22:21

## 2022-03-04 RX ADMIN — ENOXAPARIN SODIUM 30 MILLIGRAM(S): 100 INJECTION SUBCUTANEOUS at 22:21

## 2022-03-04 RX ADMIN — Medication 12.5 MILLIGRAM(S): at 17:27

## 2022-03-04 RX ADMIN — Medication 81 MILLIGRAM(S): at 12:34

## 2022-03-04 RX ADMIN — PANTOPRAZOLE SODIUM 40 MILLIGRAM(S): 20 TABLET, DELAYED RELEASE ORAL at 12:34

## 2022-03-04 RX ADMIN — Medication 12.5 MILLIGRAM(S): at 05:57

## 2022-03-04 RX ADMIN — SODIUM CHLORIDE 1000 MILLILITER(S): 9 INJECTION INTRAMUSCULAR; INTRAVENOUS; SUBCUTANEOUS at 09:30

## 2022-03-04 RX ADMIN — ATORVASTATIN CALCIUM 80 MILLIGRAM(S): 80 TABLET, FILM COATED ORAL at 22:20

## 2022-03-04 NOTE — PROGRESS NOTE ADULT - SUBJECTIVE AND OBJECTIVE BOX
71M Portuguese speaking smoker (very recently quit, smoked x 40 years) with PMHx of CHF (EF 25-30%) and prediabetes (A1c 5.9) who is now s/p robotic MIDCAB with Dr. Santos on 02/09/2022, ambulating well and discharged on POD 4, returned to OSH on 02/20/2022 with AMS, MRI showed bilateral embolic appearing strokes. Pt had ECHO, EEG and cerebral angio done at OSH as well. Patient was transferred to [facility] for further MGMT and work up, initially to CT surg but then tx to stroke service given CVA as primary reason for admission. He is pending TTE and JOEY. Course has been complicated by hypernatremia as well as known bladder and gastric masses, pending EGD +/- EUS.   On 3/3, patient found with R gaze deviation and left side flaccidity, R MCA occlusion on angio. S/p thrombectomy TCI 1 to TICI 3, transferred to ICU for further monitoring.       INTERVAL HPI/OVERNIGHT EVENTS:  Patient seen and examined. Laying in bed with NGT in place. Does not appear to be in acute distress.     MEDICATIONS  (STANDING):  aspirin  chewable 81 milliGRAM(s) Oral daily  atorvastatin 80 milliGRAM(s) Oral at bedtime  enoxaparin Injectable 40 milliGRAM(s) SubCutaneous every 24 hours  metoprolol tartrate 12.5 milliGRAM(s) Oral two times a day  pantoprazole   Suspension 40 milliGRAM(s) Oral daily  senna 2 Tablet(s) Oral at bedtime    MEDICATIONS  (PRN):  acetaminophen    Suspension .. 650 milliGRAM(s) Oral every 6 hours PRN Temp greater or equal to 38C (100.4F), Mild Pain (1 - 3)  polyethylene glycol 3350 17 Gram(s) Oral daily PRN Constipation      Allergies    No Known Allergies    Intolerances      Vital Signs Last 24 Hrs  T(C): 35.7 (04 Mar 2022 14:21), Max: 37.6 (04 Mar 2022 06:03)  T(F): 96.2 (04 Mar 2022 14:21), Max: 99.6 (04 Mar 2022 06:03)  HR: 87 (04 Mar 2022 14:00) (72 - 99)  BP: 134/70 (04 Mar 2022 14:00) (118/62 - 153/86)  BP(mean): 96 (04 Mar 2022 14:00) (82 - 114)  RR: 30 (04 Mar 2022 14:00) (12 - 34)  SpO2: 100% (04 Mar 2022 14:00) (99% - 100%)    Physical exam:  General: No acute distress. Thin, ill appearing elderly man laying in bed.  Eyes: Anicteric sclerae, moist conjunctivae, see below for CNs  Neck: trachea midline  Cardiovascular: Regular rate and rhythm  Extremities: no edema    Neurologic:  -Mental status: Stuporous. Does not follow commands. Opens eyes spontaneously but does not track.  -Cranial nerves:   III, IV, VI: Pupils equally round and reactive to light. Eyes remain fixed on examiner during head turning.  Motor: Decreased bulk. Right side moves spontaneously 2+/5. LUE 0/5 LLE 1/5.  Sensation: Grimaces to painful stimuli x4.    LABS:                        12.5   6.85  )-----------( 217      ( 04 Mar 2022 05:23 )             40.3     03-04    151<H>  |  115<H>  |  26<H>  ----------------------------<  107<H>  4.0   |  22  |  1.00    Ca    9.5      04 Mar 2022 05:23  Phos  4.2     03-04  Mg     2.5     03-04      PT/INR - ( 03 Mar 2022 12:55 )   PT: 12.5 sec;   INR: 1.05          PTT - ( 03 Mar 2022 12:55 )  PTT:31.4 sec      RADIOLOGY & ADDITIONAL TESTS:  NCT 3/3 17:17 -     Areas of hyperdensity are present within the right MCA territory but also   at the left caudate body and a left frontal gyrus compatible with   contrast staining.     ------------------- TRANSFER RECEIVING NOTE TO STROKE/TELE UNIT ------------------------    71M Serbian speaking smoker (very recently quit, smoked x 40 years) with PMHx of CHF (EF 25-30%) and prediabetes (A1c 5.9) who is now s/p robotic MIDCAB with Dr. Santos on 02/09/2022, ambulating well and discharged on POD 4, returned to OSH on 02/20/2022 with AMS, MRI showed bilateral embolic appearing strokes. Pt had ECHO, EEG and cerebral angio done at OSH as well. Patient was transferred to [facility] for further MGMT and work up, initially to CT surg but then tx to stroke service given CVA as primary reason for admission. He is pending TTE and JOEY. Course has been complicated by hypernatremia as well as known bladder and gastric masses, pending EGD +/- EUS.   On 3/3, patient found with R gaze deviation and left side flaccidity, R MCA occlusion on angio. S/p thrombectomy TCI 1 to TICI 3, transferred to ICU for further monitoring.       INTERVAL HPI/OVERNIGHT EVENTS:  Patient seen and examined. Laying in bed with NGT in place. Does not appear to be in acute distress.     MEDICATIONS  (STANDING):  aspirin  chewable 81 milliGRAM(s) Oral daily  atorvastatin 80 milliGRAM(s) Oral at bedtime  enoxaparin Injectable 40 milliGRAM(s) SubCutaneous every 24 hours  metoprolol tartrate 12.5 milliGRAM(s) Oral two times a day  pantoprazole   Suspension 40 milliGRAM(s) Oral daily  senna 2 Tablet(s) Oral at bedtime    MEDICATIONS  (PRN):  acetaminophen    Suspension .. 650 milliGRAM(s) Oral every 6 hours PRN Temp greater or equal to 38C (100.4F), Mild Pain (1 - 3)  polyethylene glycol 3350 17 Gram(s) Oral daily PRN Constipation      Allergies    No Known Allergies    Intolerances      Vital Signs Last 24 Hrs  T(C): 35.7 (04 Mar 2022 14:21), Max: 37.6 (04 Mar 2022 06:03)  T(F): 96.2 (04 Mar 2022 14:21), Max: 99.6 (04 Mar 2022 06:03)  HR: 87 (04 Mar 2022 14:00) (72 - 99)  BP: 134/70 (04 Mar 2022 14:00) (118/62 - 153/86)  BP(mean): 96 (04 Mar 2022 14:00) (82 - 114)  RR: 30 (04 Mar 2022 14:00) (12 - 34)  SpO2: 100% (04 Mar 2022 14:00) (99% - 100%)    Physical exam:  General: No acute distress. Thin, ill appearing elderly man laying in bed.  Eyes: Anicteric sclerae, moist conjunctivae, see below for CNs  Neck: trachea midline  Cardiovascular: Regular rate and rhythm  Extremities: no edema    Neurologic:  -Mental status: Stuporous. Does not follow commands. Opens eyes spontaneously but does not track.  -Cranial nerves:   III, IV, VI: Pupils equally round and reactive to light. Eyes remain fixed on examiner during head turning.  Motor: Decreased bulk. Right side moves spontaneously 2+/5. LUE 0/5 LLE 1/5.  Sensation: Grimaces to painful stimuli x4.    LABS:                        12.5   6.85  )-----------( 217      ( 04 Mar 2022 05:23 )             40.3     03-04    151<H>  |  115<H>  |  26<H>  ----------------------------<  107<H>  4.0   |  22  |  1.00    Ca    9.5      04 Mar 2022 05:23  Phos  4.2     03-04  Mg     2.5     03-04      PT/INR - ( 03 Mar 2022 12:55 )   PT: 12.5 sec;   INR: 1.05          PTT - ( 03 Mar 2022 12:55 )  PTT:31.4 sec      RADIOLOGY & ADDITIONAL TESTS:  NCHCT 3/3 17:17 -     Areas of hyperdensity are present within the right MCA territory but also   at the left caudate body and a left frontal gyrus compatible with   contrast staining.

## 2022-03-04 NOTE — PROGRESS NOTE ADULT - SUBJECTIVE AND OBJECTIVE BOX
SUBJECTIVE AND OBJECTIVE:  Patient non verbal.   Agitated.   Remains in restraints and with NGT.  INTERVAL HPI/OVERNIGHT EVENTS:  No overt neurologic improvement    DNR on chart:   Allergies    No Known Allergies    Intolerances    MEDICATIONS  (STANDING):  dextrose 50% Injectable 25 Gram(s) IV Push once  metoprolol tartrate Injectable 2.5 milliGRAM(s) IV Push every 12 hours    MEDICATIONS  (PRN):  glycopyrrolate Injectable 0.2 milliGRAM(s) IV Push every 6 hours PRN secretions  LORazepam   Injectable 0.5 milliGRAM(s) IV Push every 4 hours PRN Agitation  morphine  - Injectable 2 milliGRAM(s) IV Push every 3 hours PRN respiratory rate >22      ITEMS UNCHECKED ARE NOT PRESENT    PRESENT SYMPTOMS: [x ]Unable to obtain due to poor mentation   Source if other than patient:  [ ]Family   [ ]Team     Pain (Impact on QOL): Unable   Location:  Minimal acceptable level (0-10 scale):                   Aggravating factors:  Quality:  Radiation:  Severity (0-10 scale):    Timing:    Dyspnea:                           [ ]Mild [ ]Moderate [ ]Severe  Anxiety:                             [ ]Mild [ ]Moderate [ ]Severe  Fatigue:                             [ ]Mild [ ]Moderate [ ]Severe  Nausea:                             [ ]Mild [ ]Moderate [ ]Severe  Loss of appetite:              [ ]Mild [ ]Moderate [ ]Severe  Constipation:                    [ ]Mild [ ]Moderate [ ]Severe  Grief Present                    [ ] Yes  [ ] No   PAIN AD Score:	  http://geriatrictoolkit.[place].[facility]/cog/painad.pdf (Ctrl + left click to view)    Other Symptoms:  [ x]All other review of systems negative     Karnofsky Performance Score/Palliative Performance Status Version 2:   20-30      %    http://palliative.info/resource_material/PPSv2.pdf  PHYSICAL EXAM:  Vital Signs Last 24 Hrs  T(C): 36.6 (08 Mar 2022 14:19), Max: 36.6 (08 Mar 2022 14:19)  T(F): 97.9 (08 Mar 2022 14:19), Max: 97.9 (08 Mar 2022 14:19)  HR: 91 (08 Mar 2022 14:19) (91 - 96)  BP: 153/94 (08 Mar 2022 14:19) (141/90 - 153/94)  BP(mean): --  RR: 18 (08 Mar 2022 14:19) (18 - 18)  SpO2: 97% (08 Mar 2022 14:19) (97% - 97%) I&O's Summary    07 Mar 2022 07:01  -  08 Mar 2022 07:00  --------------------------------------------------------  IN: 0 mL / OUT: 925 mL / NET: -925 mL     GENERAL:  [ ]Alert  [ ]Oriented x   [ ]Lethargic  [ ]Cachexia  [x ]Unarousable  [ ]Verbal  [ ]Non-Verbal  Behavioral:   [ ] Anxiety  [ ] Delirium [ ] Agitation [x ] Other  HEENT:  [ ]Normal   [ x]Dry mouth   [ ]ET Tube/Trach  [ ]Oral lesions  PULMONARY:   [ ]Clear [ ]Tachypnea  [ ]Audible excessive secretions   [x ]Rhonchi        [ ]Right [ ]Left [x ]Bilateral  [ ]Crackles        [ ]Right [ ]Left [ ]Bilateral  [ ]Wheezing     [ ]Right [ ]Left [ ]Bilateral  CARDIOVASCULAR:    [x ]Regular [ ]Irregular [ ]Tachy  [ ]Ke [ ]Murmur [ ]Other  GASTROINTESTINAL:  [x ]Soft  [ ]Distended   [x ]+BS  [x ]Non tender [ ]Tender  [ ]PEG [x ]OGT/ NGT   Last BM:  GENITOURINARY:  [ ]Normal [ ] Incontinent   [ ]Oliguria/Anuria   [x ]Walsh  MUSCULOSKELETAL:   [ ]Normal   [ ]Weakness  [x ]Bed/Wheelchair bound [ ]Edema  NEUROLOGIC:   [ ]No focal deficits  [x ] Cognitive impairment  [ ] Dysphagia [ ]Dysarthria [ ] Paresis [ ]Other   SKIN:   [ ]Normal   [x ]Pressure ulcer(s)  [ ]Rash    CRITICAL CARE:  [ ] Shock Present  [ ]Septic [ ]Cardiogenic [ ]Neurologic [ ]Hypovolemic  [ ]  Vasopressors [ ]  Inotropes   [ ] Respiratory failure present  [ ] Acute  [ ] Chronic [ ] Hypoxic  [ ] Hypercarbic [ ] Other  [ ] Other organ failure     LABS:                        11.7   6.66  )-----------( 194      ( 07 Mar 2022 06:34 )             35.7   03-07    149<H>  |  114<H>  |  23  ----------------------------<  136<H>  3.9   |  24  |  0.59    Ca    8.9      07 Mar 2022 06:34  Mg     2.3     03-07          RADIOLOGY & ADDITIONAL STUDIES:    Protein Calorie Malnutrition Present: [ ] yes [ ] no  [ ] PPSV2 < or = 30%  [ ] significant weight loss [ ] poor nutritional intake [ ] anasarca [ ] catabolic state Artificial Nutrition [ ]     REFERRALS:   [ ]Chaplaincy  [ ] Hospice  [ ]Child Life  [ ]Social Work  [ ]Case management [ ]Holistic Therapy   Goals of Care Document:

## 2022-03-04 NOTE — PROGRESS NOTE ADULT - ASSESSMENT
71y/M with  acute cerebrovascular accident, s/p thrombectomy (03/03/2022, Dr. Garcia)  acute encephalopathy  acute on chronic, systolic/diastolic CHF; CAD  prediabetes  GI ulceration / antral mass, bladder mass  smoking history    PLAN:   NEURO: neurochecks q1h, PRN pain meds with acetaminophen  s/p thrombectomy, CT head today (contrast extravasation noted yesterday)  restarted ASA overnight  groin checks  hold quetiapine  REHAB:  physical therapy evaluation and management    EARLY MOB:   bed rest, flat x4h    PULM:  PRN O2 support to keep sats >/=92%, incentive spirometry  CARDIO:  SBP goal 120-150mm Hg; EF 20%, continue metoprolol  ENDO:  Blood sugar goals 140-180 mg/dL, continue statins  GI:  stomach mass - GI following, needs EGD  DIET: NPO for now, insert NGT  RENAL:  hold IVF, hold furosemide, Na goal 135-145; bladder mass - urology on board, keep Walsh for now  HEM/ONC: check post-op Hb  VTE Prophylaxis: SCDs, no DVT chemoprophylaxis for now as patient is high risk for bleed  ID: afebrile, no leukocytosis  Social: no family; roommate is ; f/u with ethics    ATTENDING ATTESTATION:  I was physically present for the key portions of the evaluation and management (E/M) service provided.  I agree with the above history, physical and plan, which I have reviewed and edited where appropriate.    Patient at high risk for neurological deterioration or death due to:  ICU delirium, aspiration PNA, DVT / PE.  Critical care time:  I have personally provided 45 minutes of critical care time, excluding time spent on separate procedures.      Plan discussed with RN, house staff. 71y/M with  acute cerebrovascular accident, s/p thrombectomy (03/03/2022, Dr. Garica)  acute encephalopathy  acute on chronic, systolic/diastolic CHF; CAD  prediabetes  GI ulceration / antral mass, bladder mass  smoking history    PLAN:   NEURO: neurochecks q4h, PRN pain meds with acetaminophen  s/p thrombectomy  ASA continue   hold quetiapine  REHAB:  physical therapy evaluation and management    EARLY MOB:   OOB to chair ambulate;     PULM:  room, incentive spirometry  CARDIO:  SBP goal 120-150mm Hg; EF 20%, continue metoprolol  ENDO:  Blood sugar goals 140-180 mg/dL, continue statins  GI:  stomach mass - GI following, needs EGD  DIET: cont tube feeds  RENAL:  off furosemide, Na goal 135-145; bladder mass - urology on board, keep Walsh for now  HEM/ONC: Hb stable  VTE Prophylaxis: SCDs, SQL tonight  ID: afebrile, no leukocytosis  Social: no family; roommate is ; f/u with ethics    ATTENDING ATTESTATION:  I was physically present for the key portions of the evaluation and management (E/M) service provided.  I agree with the above history, physical and plan which I have reviewed and edited where appropriate.     Patient not at high risk for neurologic deterioration / death.  Time spent on this noncritically ill patient: 45 minutes spent on total encounter, more than 50% of the visit was spent counseling and/or coordinating care by the attending physician.    Plan discussed with RN, house staff.    REVIEW OF SYSTEMS:  No headaches, no nausea or vomiting; 14 -point review of systems otherwise unremarkable.        ICU stepdown Checklist:    Completed: 03-04 @ 09:32    [X] hemodynamically stable – VS WNL and stable x 24hours, UO adequate  [n/a ] if  previously on HDA - off pressors x 24h with stable neuro exam    [X] no new symptoms x 24h (i.e. new fever, new-onset nausea/vomiting)  [X] stable labs: (i.e. WBC not rising, sodium not dropping)  [X] patient not at high risk for aspiration, if high risk then:                  [ ] should have definitive plans for trach/PEG (alternative option is to discharge from ICU to facilty)                  [ ] stepdown to bed close to nurse’s station  [n/a] low suctioning requirements (i.e. q4h or less)  [X] sign-off from primary RN* Zunilda  [X] drains do not require ICU level of care  [X] if patient previously agitated or with behavioral issues – controlled   [X] pain controlled

## 2022-03-04 NOTE — PROGRESS NOTE ADULT - SUBJECTIVE AND OBJECTIVE BOX
HPI:  s/p robotic MIDCAB with Dr. Santos on 02/09/2022. Readmit with CVA / PFO. EF = 25%.     71M [place]ralt[place] speaking smoker (very recently quit, smoked x 40 years) with PMHx of CHF (EF 25-30%) and prediabetes (A1c 5.9) who is now s/p robotic MIDCAB with Dr. Santos on 02/09/2022, ambulating well and discharged on POD 4, returned to OSH on 02/20/2022 with AMS, MRI showed bilateral embolic appearing strokes. Pt had ECHO, EEG and cerebral angio done at OSH as well. Patient was transferred to [facility] for further MGMT and work up, initially to CT surg but then tx to stroke service given CVA as primary reason for admission. He is pending TTE and JOEY. Course has been complicated by hypernatremia as well as known bladder and gastric masses, pending EGD +/- EUS.     S/Overnight events: LANETTE overnight, NGT replaced. ASA 81mg QD started for CVA. Patient not answering questions, will follow commands on R side in [place].     Hospital Course:   02/24/2022: SLP rec soft, bite sized with mild thick liquids, GI and  consulted (stomach antrum ulceration and bladder mass on CT). Transfer to neuro. UA ordered.  o/n: urine cx and repeat UA ordered. multiple pvcs in beginning of night, asx.   02/25: Stable, TTE and EKG ordered. Cardiology consult for heart failure management. He will have the EGD and JOEY on Monday. Trying to get his images (faxed over today, will likely send Monday at earliest)  o/n: LANETTE, called multiple times for CT to get done  2/26: Started on D5w for hypernatremia due to suspected insufficent PO intake at 70 mL/hr. Transitioned to NS.  CT head performed with age-indeterminate infarcts in the right frontal operculum, right corona radiata, right basal ganglia and right insular cortex.   o/n: agitated, getting out of bed, defecated on floor, appeared to be in pain, gave 1mg haldol and IV tylenol, patient appeared to be tugging at evans - blood noticed around evans and on pad, consider urology consult in AM   2/27 - Na 153, continue to encourage PO fluid intake (1.8L fluid deficit). Spoke with roommate re: decision making. She agrees to consent for JOEY and EGD, but will have to consult pall and ethics re: further decision. Ordered L renal ultrasound d/t lesion seen on CT abdomen previously. Started NS at 50cc/hr.  o/n: LANETTE, made NPO because not sure if having JOEY? renewed LANETTE ontiveros  2/28: Placed NGT, ordered 300cc free water q4h for hypernatremia. Not cleared by speech and swallow for diet upgrade. Consulted Ethics - OK for roommate to be making decisions she is comfortable with, updated GI. Underwent retroperitoneal US to eval renal lesion.   O/N : Pt pulled his NGT @ around 6:45pm  03/01: More awake, pending JOEY and EGD. Called GI and they will consider taking conset from the room mate, and change their requirment for Na. No NGT, encourge oral hydration, and low Na diet.   O/N: patient agitated needed 2.5mg of seroquel tried to get out of bed, distressed  03/02: The patient is fluctuating in his LOC, still elevated Na 152, Started on 1/2 NS 50ml/h for 10 hours. BMP tonight with a goal 149 mmol. No EGD or JOEY.  discussing if investigation will be done as inpatient.   3/3: Patient had a change in mental staus this morning, stroke code called, pt found to have occlusion of R M1. Now POD#0 S/p R M1 thrombectomy TICI 1 - 3. Transferred to neurosurgery service post angio. Post thrombectomy CTH done, some contrast extravasation in R MCA territory and L caudate. ASA 81mg started for CVA, stroke.neurology following.    3/4: POD#1 s/p mechanical thrombectomy of R M1 occlusion, TICI 1- 3. NGT replaced overnight. Cont ASA 81mg QD, SBP goal 120-150. F/u stroke/neurology recs. Repeat TTE w/ bubble study pend, last EF from 2/24 20%.      Vital Signs Last 24 Hrs  T(C): 36 (04 Mar 2022 01:43), Max: 36.8 (03 Mar 2022 04:35)  T(F): 96.8 (04 Mar 2022 01:43), Max: 98.2 (03 Mar 2022 04:35)  HR: 80 (04 Mar 2022 00:00) (78 - 108)  BP: 142/82 (04 Mar 2022 00:00) (119/75 - 194/94)  BP(mean): 107 (04 Mar 2022 00:00) (82 - 135)  RR: 19 (04 Mar 2022 00:00) (11 - 34)  SpO2: 100% (04 Mar 2022 00:00) (98% - 100%)    I&O's Detail    02 Mar 2022 07:01  -  03 Mar 2022 07:00  --------------------------------------------------------  IN:    sodium chloride 0.45%: 550 mL  Total IN: 550 mL    OUT:    Indwelling Catheter - Urethral (mL): 700 mL  Total OUT: 700 mL    Total NET: -150 mL      03 Mar 2022 07:01  -  04 Mar 2022 02:02  --------------------------------------------------------  IN:    Sodium Chloride 0.9% Bolus: 250 mL  Total IN: 250 mL    OUT:    Indwelling Catheter - Urethral (mL): 630 mL  Total OUT: 630 mL    Total NET: -380 mL        I&O's Summary    02 Mar 2022 07:01  -  03 Mar 2022 07:00  --------------------------------------------------------  IN: 550 mL / OUT: 700 mL / NET: -150 mL    03 Mar 2022 07:01  -  04 Mar 2022 02:02  --------------------------------------------------------  IN: 250 mL / OUT: 630 mL / NET: -380 mL        PHYSICAL EXAM:  Neurological:    Motor exam:         [] Upper extremity              Bi(c5)  WE(c6)  EE(c7)   FF(c8)                                                R         5/5        5/5        5/5       5/5                                               L          5/5        5/5        5/5       5/5         [] Lower extremeity          HF(l2)   KE(l3)    TA(l4)   EHL(l5)  GS(s1)                                                 R        5/5        5/5        5/5       5/5         5/5                                               L         5/5        5/5       5/5       5/5          5/5                                                        [] warm well perfused; capillary refill <3 seconds     Sensation: [] intact to light touch  [] decreased:       Cardiovascular:  Respiratory:  Gastrointestinal:  Genitourinary:  Extremities:    Incision/Wound:    DEVICE/DRAIN DRESSING:    TUBES/LINES:  [] CVC  [] A-line  [] Lumbar Drain  [] Ventriculostomy  [] Other    DIET:  [] NPO  [] Mechanical  [] Tube feeds    LABS:  PT/INR - ( 03 Mar 2022 12:55 )   PT: 12.5 sec;   INR: 1.05          PTT - ( 03 Mar 2022 12:55 )  PTT:31.4 sec        CAPILLARY BLOOD GLUCOSE  72 (03 Mar 2022 09:29)      POCT Blood Glucose.: 77 mg/dL (03 Mar 2022 08:19)      Drug Levels: [] N/A    CSF Analysis: [] N/A      Allergies    No Known Allergies    Intolerances      MEDICATIONS:  Antibiotics:    Neuro:  acetaminophen    Suspension .. 650 milliGRAM(s) Oral every 6 hours PRN    Anticoagulation:  aspirin  chewable 81 milliGRAM(s) Oral daily    OTHER:  atorvastatin 80 milliGRAM(s) Oral at bedtime  metoprolol tartrate 12.5 milliGRAM(s) Oral two times a day  pantoprazole   Suspension 40 milliGRAM(s) Oral daily  polyethylene glycol 3350 17 Gram(s) Oral daily PRN  senna 2 Tablet(s) Oral at bedtime    IVF:    CULTURES:    RADIOLOGY & ADDITIONAL TESTS:      ASSESSMENT:  71y Male s/p    POST OP COMPLICATIONSSTROKE    STROKE    No pertinent family history in first degree relatives    Handoff    MEWS Score    Acute on chronic combined systolic and diastolic congestive heart failure    Prediabetes    Smoking hx    Stroke    Stroke    CVA (cerebrovascular accident)    Dysphagia    Debility    Encounter for palliative care    Angiogram, cerebral, with thrombectomy    No significant past surgical history    No significant past surgical history    [email or web address]        PLAN:  NEURO:    CARDIOVASCULAR:    PULMONARY:    RENAL:    GI:    HEME:    ID:    ENDO:    DVT PROPHYLAXIS:  [] Venodynes                                [] Heparin/Lovenox    FALL RISK:  [] Low Risk                                    [] Impulsive    DISPOSITION:  HPI:  s/p robotic MIDCAB with Dr. Santos on 02/09/2022. Readmit with CVA / PFO. EF = 25%.     71M [place]ian speaking smoker (very recently quit, smoked x 40 years) with PMHx of CHF (EF 25-30%) and prediabetes (A1c 5.9) who is now s/p robotic MIDCAB with Dr. Santos on 02/09/2022, ambulating well and discharged on POD 4, returned to OSH on 02/20/2022 with AMS, MRI showed bilateral embolic appearing strokes. Pt had ECHO, EEG and cerebral angio done at OSH as well. Patient was transferred to [facility] for further MGMT and work up, initially to CT surg but then tx to stroke service given CVA as primary reason for admission. He is pending TTE and JOEY. Course has been complicated by hypernatremia as well as known bladder and gastric masses, pending EGD +/- EUS.     S/Overnight events: LANETTE overnight, NGT replaced. ASA 81mg QD started for CVA. Patient not answering questions, will follow commands on R side in [place]uvian.     Hospital Course:   02/24/2022: SLP rec soft, bite sized with mild thick liquids, GI and  consulted (stomach antrum ulceration and bladder mass on CT). Transfer to neuro. UA ordered.  o/n: urine cx and repeat UA ordered. multiple pvcs in beginning of night, asx.   02/25: Stable, TTE and EKG ordered. Cardiology consult for heart failure management. He will have the EGD and JOEY on Monday. Trying to get his images (faxed over today, will likely send Monday at earliest)  o/n: LANETTE, called multiple times for CT to get done  2/26: Started on D5w for hypernatremia due to suspected insufficent PO intake at 70 mL/hr. Transitioned to NS.  CT head performed with age-indeterminate infarcts in the right frontal operculum, right corona radiata, right basal ganglia and right insular cortex.   o/n: agitated, getting out of bed, defecated on floor, appeared to be in pain, gave 1mg haldol and IV tylenol, patient appeared to be tugging at evans - blood noticed around evans and on pad, consider urology consult in AM   2/27 - Na 153, continue to encourage PO fluid intake (1.8L fluid deficit). Spoke with roommate re: decision making. She agrees to consent for JOEY and EGD, but will have to consult pall and ethics re: further decision. Ordered L renal ultrasound d/t lesion seen on CT abdomen previously. Started NS at 50cc/hr.  o/n: LANETTE, made NPO because not sure if having JOEY? renewed LANETTE ontiveros  2/28: Placed NGT, ordered 300cc free water q4h for hypernatremia. Not cleared by speech and swallow for diet upgrade. Consulted Ethics - OK for roommate to be making decisions she is comfortable with, updated GI. Underwent retroperitoneal US to eval renal lesion.   O/N : Pt pulled his NGT @ around 6:45pm  03/01: More awake, pending JOEY and EGD. Called GI and they will consider taking conset from the room mate, and change their requirment for Na. No NGT, encourge oral hydration, and low Na diet.   O/N: patient agitated needed 2.5mg of seroquel tried to get out of bed, distressed  03/02: The patient is fluctuating in his LOC, still elevated Na 152, Started on 1/2 NS 50ml/h for 10 hours. BMP tonight with a goal 149 mmol. No EGD or JOEY.  discussing if investigation will be done as inpatient.   3/3: Patient had a change in mental staus this morning, stroke code called, pt found to have occlusion of R M1. Now POD#0 S/p R M1 thrombectomy TICI 1 - 3. Transferred to neurosurgery service post angio. Post thrombectomy CTH done, some contrast extravasation in R MCA territory and L caudate. ASA 81mg started for CVA, stroke.neurology following.    3/4: POD#1 s/p mechanical thrombectomy of R M1 occlusion, TICI 1- 3. NGT replaced overnight. Cont ASA 81mg QD, SBP goal 120-150. F/u stroke/neurology recs. Repeat TTE w/ bubble study pend, last EF from 2/24 20%.      Vital Signs Last 24 Hrs  T(C): 36 (04 Mar 2022 01:43), Max: 36.8 (03 Mar 2022 04:35)  T(F): 96.8 (04 Mar 2022 01:43), Max: 98.2 (03 Mar 2022 04:35)  HR: 80 (04 Mar 2022 00:00) (78 - 108)  BP: 142/82 (04 Mar 2022 00:00) (119/75 - 194/94)  BP(mean): 107 (04 Mar 2022 00:00) (82 - 135)  RR: 19 (04 Mar 2022 00:00) (11 - 34)  SpO2: 100% (04 Mar 2022 00:00) (98% - 100%)    I&O's Detail    02 Mar 2022 07:01  -  03 Mar 2022 07:00  --------------------------------------------------------  IN:    sodium chloride 0.45%: 550 mL  Total IN: 550 mL    OUT:    Indwelling Catheter - Urethral (mL): 700 mL  Total OUT: 700 mL    Total NET: -150 mL      03 Mar 2022 07:01  -  04 Mar 2022 02:02  --------------------------------------------------------  IN:    Sodium Chloride 0.9% Bolus: 250 mL  Total IN: 250 mL    OUT:    Indwelling Catheter - Urethral (mL): 630 mL  Total OUT: 630 mL    Total NET: -380 mL        I&O's Summary    02 Mar 2022 07:01  -  03 Mar 2022 07:00  --------------------------------------------------------  IN: 550 mL / OUT: 700 mL / NET: -150 mL    03 Mar 2022 07:01  -  04 Mar 2022 02:02  --------------------------------------------------------  IN: 250 mL / OUT: 630 mL / NET: -380 mL      PHYSICAL EXAM:  General: NAD, cachectic appearing. pt is comfortably sitting up in bed, not responding to questions, on RA (examined using  #[id number])   HEENT: PERRL 3mm, +R gaze preference, + L facial droop   Cardiovascular: RRR, normal S1 and S2   Respiratory: lungs CTAB, no wheezing, rhonchi, or crackles   GI: normoactive BS to auscultation, abd soft, NTND   Neuro: following some simple commands in [place]ian  RU/RL moving spontaneously and purposefully antigravity   GEORGE w/d, LL triple flex   Extremities: distal pulses 2+ x4   Wound/incision: R groin site dressing C/D/I     TUBES/LINES:  [] CVC  [] A-line  [] Lumbar Drain  [] Ventriculostomy  [] Other    DIET:  [] NPO  [] Mechanical  [X] Tube feeds    LABS:  PT/INR - ( 03 Mar 2022 12:55 )   PT: 12.5 sec;   INR: 1.05          PTT - ( 03 Mar 2022 12:55 )  PTT:31.4 sec        CAPILLARY BLOOD GLUCOSE  72 (03 Mar 2022 09:29)      POCT Blood Glucose.: 77 mg/dL (03 Mar 2022 08:19)      Drug Levels: [] N/A    CSF Analysis: [] N/A      Allergies    No Known Allergies    Intolerances      MEDICATIONS:  Antibiotics:    Neuro:  acetaminophen    Suspension .. 650 milliGRAM(s) Oral every 6 hours PRN    Anticoagulation:  aspirin  chewable 81 milliGRAM(s) Oral daily    OTHER:  atorvastatin 80 milliGRAM(s) Oral at bedtime  metoprolol tartrate 12.5 milliGRAM(s) Oral two times a day  pantoprazole   Suspension 40 milliGRAM(s) Oral daily  polyethylene glycol 3350 17 Gram(s) Oral daily PRN  senna 2 Tablet(s) Oral at bedtime    IVF:    CULTURES:    RADIOLOGY & ADDITIONAL TESTS:  < from: CT Head No Cont (03.03.22 @ 17:17) >  IMPRESSION:    Areas of hyperdensity are present within the right MCA territory but also   at the left caudate body and a left frontal gyrus compatible with   contrast staining.    < end of copied text >      ASSESSMENT:  71M [place]uvian speaking smoker (very recently quit, smoked x 40 years) with PMHx of CHF (EF 25-30%), prediabetes, s/p robotic MIDCAB with Dr. Santos on 02/09/2022 transferred from OSH on 2/20/2022 presenting with AMS, MRI revealed acute MCA CVA, b/l acute infarcts largest in R basal ganglia and insular region. Transferred to [facility] for further workup. Currently s/p R M1 thrombectomy 3/3/22.       POST OP COMPLICATIONS STROKE    STROKE    No pertinent family history in first degree relatives    Handoff    MEWS Score    Acute on chronic combined systolic and diastolic congestive heart failure    Prediabetes    Smoking hx    Stroke    Stroke    CVA (cerebrovascular accident)    Dysphagia    Debility    Encounter for palliative care    Angiogram, cerebral, with thrombectomy    No significant past surgical history    No significant past surgical history    SysAdmin_VstLnk        PLAN:  NEURO  - neuro checks/vital signs q1hr  - post thrombectomy CTH 3/3 shows some contrast staining R >L, no hemorrhage noted   - ASA 81mg started 3/3, f/u stroke/neurology recs   - transfer to stroke/neurology once CTH completed 3/4?   - groin checks; R groin dressing ok to remove 3/4   - pend PT/OT assessment     CARDIO  - -150  - echo 2/24: EF 20%  - pend TTE with bubble study (last echo on 2/24   - continue home metoprolol (changed to 12.5mg lopressor BID per NGT)     PULM  - on RA  - no issues     GI/:  - NGT inserted, TF started   - GI will follow outpatient for stomach mass  -  following for bladder mass  - evans; keep during hospitalization per urology, will need outpatient follow-up for bladder mass   - bowel regimen   - pend NGT placement  - cont home med protonix    RENAL  - Na goal 135-145  - 1/2 NS @50 dc'd\  - FW 200mg q8hrs for 24hrs; hyperNa, trend Cr, decreased UOP overnight     ENDO  - A1C: 5.4  - no issues    ID  - no issues    HEME:  - SCDS for DVT ppx  - ASA 81mg started for CVA per stroke/ neurology   - no chemoprophylaxis at this time     Dispo: ICU status, roomate updated, dispo pending     D/w Dr. Garcia and Dr. Arthur

## 2022-03-04 NOTE — PROGRESS NOTE ADULT - SUBJECTIVE AND OBJECTIVE BOX
=================================  NEUROCRITICAL CARE ATTENDING NOTE  =================================    RAMSEY ROSENBERG   MRN-[id number]  Summary:  71y/M Yi speaking male, smoker (very recently quit, smoked x 40 years) with PMHx of CHF (EF 25-30%) and prediabetes (A1c 5.9) who is now s/p robotic MIDCAB with Dr. Santos on 02/09/2022.  Post OR he was extubated on POD 0, started on beta blockers by POD 2 and transferred to telemetry.  By POD 4 he was ambulating independently, on room air, having normal BM's, tolerating PO diet with surgical pain under control. He was discharged home on POD 4.  On 02/20/2022 patient presented to OSH with AMS. Work up included CT without, CTA Head and Neck and MRI Jerry showing acute MCA CVA, b/l acute inracts  with the largest in the right basal ganglia and insular region. EEG negative for seizure. TTE = ? PFO. Patient transfer to [facility] for further MGMT and work up.  (02/23/2022    COURSE IN THE HOSPITAL:  02/23 admitted to [facility]  03/03 sudden L weakness, gaze deviation --> CTA showed R M1 occlusion; s/p thrombectomy TICI 1 to 3   03/04 low urine output    Past Medical History: Acute on chronic combined systolic and diastolic congestive heart failure Prediabetes Smoking hx  Allergies:  No Known Allergies  Home meds:   ·	acetaminophen 325 mg oral tablet: 2 tab(s) orally every 6 hours, As needed, Mild Pain (1 - 3)  ·	aspirin 81 mg oral tablet, chewable: 1 tab(s) orally once a day  ·	atorvastatin 80 mg oral tablet: 1 tab(s) orally once a day (at bedtime)  ·	furosemide 20 mg oral tablet: 1 tab(s) orally once a day  ·	metoprolol tartrate 25 mg oral tablet: 1 tab(s) orally every 12 hours   ·	pantoprazole 40 mg oral delayed release tablet: 1 tab(s) orally once a day (before a meal)  ·	polyethylene glycol 3350 oral powder for reconstitution: 17 gram(s) orally once a day  ·	senna oral tablet: 2 tab(s) orally once a day (at bedtime)    PHYSICAL EXAMINATION  T(C): 37.6 (03-04 @ 06:03), Max: 37.6 (03-04 @ 06:03) HR: 75 (03-04 @ 06:00) (73 - 108) BP: 129/69 (03-04 @ 06:00) (119/75 - 194/94) RR: 14 (03-04 @ 06:00) (11 - 34) SpO2: 100% (03-04 @ 06:00) (98% - 100%)   NEUROLOGIC EXAMINATION:  Patient follows commands (2 fingers), does not open eyes, does not speak, withdraws on L UE TF L LE; L facial; R gaze; pupils 3 and reactive  GENERAL: not intubated, not in cardiorespiratory distress  EENT:  anicteric  CARDIOVASCULAR: (+) S1 S2, normal rate and regular rhythm  PULMONARY: clear to auscultation bilaterally  ABDOMEN: soft, nontender with normoactive bowel sounds  EXTREMITIES: no edema  SKIN: no rash    LABS: 03-04  CAPILLARY BLOOD GLUCOSE 72                       12.5   6.85  )-----------( 217      ( 04 Mar 2022 05:23 )             40.3   (150)  151<H>  |  115<H>  |  26<H>  ----------------------------<  107<H>  4.0   |  22  |  1.00    Ca    9.5      04 Mar 2022 05:23  Phos  4.2     03-04  Mg     2.5     03-04 03-02 @ 07:01  -  03-03 @ 07:00  IN: 550 mL / OUT: 700 mL / NET: -150 mL    HbA1C = 5.4 (02-08)  LDL = 90 (02-08)   HDL = 67 (02-08)  TG = 64 (02-08)   TSH = 7.640 (02-08)    Bacteriology:  CSF studies:  EEG:  Neuroimaging:  Other imaging:    MEDICATIONS: 03-04    ·	aspirin  chewable 81 Oral daily  ·	metoprolol tartrate 12.5 milliGRAM(s) Oral two times a day  ·	pantoprazole   Suspension 40 Oral daily  ·	senna 2 Oral at bedtime  ·	atorvastatin 80 Oral at bedtime  ·	acetaminophen    Suspension .. 650 Oral every 6 hours PRN  ·	polyethylene glycol 3350 17 Oral daily PRN    IV FLUIDS:  DRIPS:  DIET: tube feeds  Lines:  Drains:    Wounds:    CODE STATUS:  Full Code                       GOALS OF CARE:  aggressive                      DISPOSITION:  ICU  NIHSS 22 =================================  NEUROCRITICAL CARE ATTENDING NOTE  =================================    RAMSEY ROSENBERG   MRN-[id number]  Summary:  71y/M Icelandic speaking male, smoker (very recently quit, smoked x 40 years) with PMHx of CHF (EF 25-30%) and prediabetes (A1c 5.9) who is now s/p robotic MIDCAB with Dr. Santos on 02/09/2022.  Post OR he was extubated on POD 0, started on beta blockers by POD 2 and transferred to telemetry.  By POD 4 he was ambulating independently, on room air, having normal BM's, tolerating PO diet with surgical pain under control. He was discharged home on POD 4.  On 02/20/2022 patient presented to OSH with AMS. Work up included CT without, CTA Head and Neck and MRI Jerry showing acute MCA CVA, b/l acute inracts  with the largest in the right basal ganglia and insular region. EEG negative for seizure. TTE = ? PFO. Patient transfer to [facility] for further MGMT and work up.  (02/23/2022    COURSE IN THE HOSPITAL:  02/23 admitted to [facility]  03/03 sudden L weakness, gaze deviation --> CTA showed R M1 occlusion; s/p thrombectomy TICI 1 to 3   03/04 low urine output, started on free water    Past Medical History: Acute on chronic combined systolic and diastolic congestive heart failure Prediabetes Smoking hx  Allergies:  No Known Allergies  Home meds:   ·	acetaminophen 325 mg oral tablet: 2 tab(s) orally every 6 hours, As needed, Mild Pain (1 - 3)  ·	aspirin 81 mg oral tablet, chewable: 1 tab(s) orally once a day  ·	atorvastatin 80 mg oral tablet: 1 tab(s) orally once a day (at bedtime)  ·	furosemide 20 mg oral tablet: 1 tab(s) orally once a day  ·	metoprolol tartrate 25 mg oral tablet: 1 tab(s) orally every 12 hours   ·	pantoprazole 40 mg oral delayed release tablet: 1 tab(s) orally once a day (before a meal)  ·	polyethylene glycol 3350 oral powder for reconstitution: 17 gram(s) orally once a day  ·	senna oral tablet: 2 tab(s) orally once a day (at bedtime)    PHYSICAL EXAMINATION  T(C): 37.6 (03-04 @ 06:03), Max: 37.6 (03-04 @ 06:03) HR: 75 (03-04 @ 06:00) (73 - 108) BP: 129/69 (03-04 @ 06:00) (119/75 - 194/94) RR: 14 (03-04 @ 06:00) (11 - 34) SpO2: 100% (03-04 @ 06:00) (98% - 100%)   NEUROLOGIC EXAMINATION:  Patient follows commands (2 fingers), does not open eyes, does not speak, withdraws on L UE TF L LE; L facial; R gaze; pupils 3 and reactive  GENERAL: not intubated, not in cardiorespiratory distress  EENT:  anicteric  CARDIOVASCULAR: (+) S1 S2, normal rate and regular rhythm  PULMONARY: clear to auscultation bilaterally  ABDOMEN: soft, nontender with normoactive bowel sounds  EXTREMITIES: no edema  SKIN: no rash    LABS: 03-04  CAPILLARY BLOOD GLUCOSE 72                       12.5   6.85  )-----------( 217      ( 04 Mar 2022 05:23 )             40.3   (150)  151<H>  |  115<H>  |  26<H>  ----------------------------<  107<H>  4.0   |  22  |  1.00 (0.87)    Ca    9.5      04 Mar 2022 05:23  Phos  4.2     03-04  Mg     2.5     03-04 03-02 @ 07:01  -  03-03 @ 07:00  IN: 550 mL / OUT: 700 mL / NET: -150 mL    HbA1C = 5.4 (02-08)  LDL = 90 (02-08)   HDL = 67 (02-08)  TG = 64 (02-08)   TSH = 7.640 (02-08)    Bacteriology:  CSF studies:  EEG:  Neuroimaging:  Other imaging:    MEDICATIONS: 03-04    ·	aspirin  chewable 81 Oral daily  ·	metoprolol tartrate 12.5 milliGRAM(s) Oral two times a day  ·	pantoprazole   Suspension 40 Oral daily  ·	senna 2 Oral at bedtime  ·	atorvastatin 80 Oral at bedtime  ·	acetaminophen    Suspension .. 650 Oral every 6 hours PRN  ·	polyethylene glycol 3350 17 Oral daily PRN    IV FLUIDS: IVL  DRIPS:  DIET: tube feeds; free water 200 q8h  Lines:  Drains:  Walsh  Wounds:    CODE STATUS:  Full Code                       GOALS OF CARE:  aggressive                      DISPOSITION:  ICU  NIHSS 22

## 2022-03-04 NOTE — PROGRESS NOTE ADULT - ATTENDING COMMENTS
The patient is a 71-year-old Persian/English-speaking male with a history of recent tobacco dependence, severe ICAD, CHF with reduced ejection fraction (20%), prediabetes, CAD status post robotic MIDCAB 2/9/2022, bladder mass concerning for malignancy (not biopsy proved), and gastric mass (?maligancy, pending EGD).  He was admited with bilateral acute and subacute infarcts of unclear mechanism though occult a fib, athero, and malignancy driving hypercoagulable state are possibilities.  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      Overall, given suspected cancer, disabling stroke, and other comorbidities, prognosis is poor. We will discuss next steps with patient's roommate, pall care, and ethics. Comfort measures would not be unreasonable. For now, continue aspirin, statin.

## 2022-03-04 NOTE — PROGRESS NOTE ADULT - SUBJECTIVE AND OBJECTIVE BOX
HPI:  s/p robotic MIDCAB with Dr. Santos on 02/09/2022. Readmit with CVA / PFO. EF = 25%.     71M Mozambican speaking smoker (very recently quit, smoked x 40 years) with PMHx of CHF (EF 25-30%) and prediabetes (A1c 5.9) who is now s/p robotic MIDCAB with Dr. Santos on 02/09/2022, ambulating well and discharged on POD 4, returned to OSH on 02/20/2022 with AMS, MRI showed bilateral embolic appearing strokes. Pt had ECHO, EEG and cerebral angio done at OSH as well. Patient was transferred to [facility] for further MGMT and work up, initially to CT surg but then tx to stroke service given CVA as primary reason for admission. He is pending TTE and JOEY. Course has been complicated by hypernatremia as well as known bladder and gastric masses, pending EGD +/- EUS.       S/Overnight events:        Hospital Course:   POD#       Vital Signs Last 24 Hrs  T(C): 36 (04 Mar 2022 01:43), Max: 36.8 (03 Mar 2022 04:35)  T(F): 96.8 (04 Mar 2022 01:43), Max: 98.2 (03 Mar 2022 04:35)  HR: 80 (04 Mar 2022 00:00) (78 - 108)  BP: 142/82 (04 Mar 2022 00:00) (119/75 - 194/94)  BP(mean): 107 (04 Mar 2022 00:00) (82 - 135)  RR: 19 (04 Mar 2022 00:00) (11 - 34)  SpO2: 100% (04 Mar 2022 00:00) (98% - 100%)    I&O's Detail    02 Mar 2022 07:01  -  03 Mar 2022 07:00  --------------------------------------------------------  IN:    sodium chloride 0.45%: 550 mL  Total IN: 550 mL    OUT:    Indwelling Catheter - Urethral (mL): 700 mL  Total OUT: 700 mL    Total NET: -150 mL      03 Mar 2022 07:01  -  04 Mar 2022 01:51  --------------------------------------------------------  IN:    Sodium Chloride 0.9% Bolus: 250 mL  Total IN: 250 mL    OUT:    Indwelling Catheter - Urethral (mL): 630 mL  Total OUT: 630 mL    Total NET: -380 mL        I&O's Summary    02 Mar 2022 07:01  -  03 Mar 2022 07:00  --------------------------------------------------------  IN: 550 mL / OUT: 700 mL / NET: -150 mL    03 Mar 2022 07:01  -  04 Mar 2022 01:51  --------------------------------------------------------  IN: 250 mL / OUT: 630 mL / NET: -380 mL        PHYSICAL EXAM:  Neurological:    Motor exam:         [] Upper extremity              Bi(c5)  WE(c6)  EE(c7)   FF(c8)                                                R         5/5        5/5        5/5       5/5                                               L          5/5        5/5        5/5       5/5         [] Lower extremeity          HF(l2)   KE(l3)    TA(l4)   EHL(l5)  GS(s1)                                                 R        5/5        5/5        5/5       5/5         5/5                                               L         5/5        5/5       5/5       5/5          5/5                                                        [] warm well perfused; capillary refill <3 seconds     Sensation: [] intact to light touch  [] decreased:       Cardiovascular:  Respiratory:  Gastrointestinal:  Genitourinary:  Extremities:    Incision/Wound:    DEVICE/DRAIN DRESSING:    TUBES/LINES:  [] CVC  [] A-line  [] Lumbar Drain  [] Ventriculostomy  [] Other    DIET:  [] NPO  [] Mechanical  [] Tube feeds    LABS:  PT/INR - ( 03 Mar 2022 12:55 )   PT: 12.5 sec;   INR: 1.05          PTT - ( 03 Mar 2022 12:55 )  PTT:31.4 sec        CAPILLARY BLOOD GLUCOSE  72 (03 Mar 2022 09:29)      POCT Blood Glucose.: 77 mg/dL (03 Mar 2022 08:19)      Drug Levels: [] N/A    CSF Analysis: [] N/A      Allergies    No Known Allergies    Intolerances      MEDICATIONS:  Antibiotics:    Neuro:  acetaminophen    Suspension .. 650 milliGRAM(s) Oral every 6 hours PRN    Anticoagulation:  aspirin  chewable 81 milliGRAM(s) Oral daily    OTHER:  atorvastatin 80 milliGRAM(s) Oral at bedtime  metoprolol tartrate 12.5 milliGRAM(s) Oral two times a day  pantoprazole   Suspension 40 milliGRAM(s) Oral daily  polyethylene glycol 3350 17 Gram(s) Oral daily PRN  senna 2 Tablet(s) Oral at bedtime    IVF:    CULTURES:    RADIOLOGY & ADDITIONAL TESTS:      ASSESSMENT:  71y Male s/p    POST OP COMPLICATIONSSTROKE    STROKE    No pertinent family history in first degree relatives    Handoff    MEWS Score    Acute on chronic combined systolic and diastolic congestive heart failure    Prediabetes    Smoking hx    Stroke    Stroke    CVA (cerebrovascular accident)    Dysphagia    Debility    Encounter for palliative care    Angiogram, cerebral, with thrombectomy    No significant past surgical history    No significant past surgical history    [id number]        PLAN:  NEURO:    CARDIOVASCULAR:    PULMONARY:    RENAL:    GI:    HEME:    ID:    ENDO:    DVT PROPHYLAXIS:  [] Venodynes                                [] Heparin/Lovenox    FALL RISK:  [] Low Risk                                    [] Impulsive    DISPOSITION:

## 2022-03-04 NOTE — PROGRESS NOTE ADULT - ASSESSMENT
71M Vietnamese speaking smoker (very recently quit, smoked x 40 years) with PMHx of CHF (EF 25-30%), prediabetes, s/p robotic MIDCAB with Dr. Santos on 02/09/2022 transferred from OSH on 2/20/2022 presenting with AMS, MRI revealed acute MCA CVA, b/l acute infarcts largest in R basal ganglia and insular region. Transferred to [facility] for further workup. Currently s/p R M1 thrombectomy 3/3/22.     1. Secondary stroke prevention  - Continue ASA 81mg PO daily   - Continue Atorvastatin 80mg PO daily    2. Stroke risk factors  - A1C: 5.4%  - LDL: 90    3. Further management  - Repeat NCHCT  - Recommend normotension  - Recommend q4hr stroke neuro checks  - Provide stroke education  - F/u with palliative and ethics regarding GOC and decision making by roommate    DVT prophylaxis   - Consider starting Lovenox SQ in addition to SCDs    Discussed with Neurology Attending Dr. Nazia Joiner

## 2022-03-04 NOTE — CHART NOTE - NSCHARTNOTEFT_GEN_A_CORE
Admitting Diagnosis:   Patient is a 71y old  Male who presents with a chief complaint of CVA (04 Mar 2022 02:02)    PAST MEDICAL & SURGICAL HISTORY:  Acute on chronic combined systolic and diastolic congestive heart failure  Prediabetes  Smoking hx    Current Nutrition Order: NPO diet  EN regimen: Jevity 1.5 @ 30 ml/hr x24hrs via NGT providing 720 ml TV, 1080 kcal, 46g pro, 547 ml free water.     PO Intake: Good (%) [   ]  Fair (50-75%) [   ] Poor (<25%) [   ]- N/A    GI Issues:   WDL, no n/v/d/c  No abd distention or discomfort noted    Pain:  No pain noted at this time    Skin Integrity:  Surgical incision, zuleyka score 14  No edema present  No pressure ulcers noted    Labs:   03-04    151<H>  |  115<H>  |  26<H>  ----------------------------<  107<H>  4.0   |  22  |  1.00    Ca    9.5      04 Mar 2022 05:23  Phos  4.2     03-04  Mg     2.5     03-04    Medications:  MEDICATIONS  (STANDING):  aspirin  chewable 81 milliGRAM(s) Oral daily  atorvastatin 80 milliGRAM(s) Oral at bedtime  metoprolol tartrate 12.5 milliGRAM(s) Oral two times a day  pantoprazole   Suspension 40 milliGRAM(s) Oral daily  senna 2 Tablet(s) Oral at bedtime    MEDICATIONS  (PRN):  acetaminophen    Suspension .. 650 milliGRAM(s) Oral every 6 hours PRN Temp greater or equal to 38C (100.4F), Mild Pain (1 - 3)  polyethylene glycol 3350 17 Gram(s) Oral daily PRN Constipation    Anthropometrics:  Ht: 162.56cm (64") -Per prior assessment   Wt: 87.5lbs (39.8kg) BMI 15.0 kg/m2  IBW: 130lbs (58.9kg)  %IBW: 68%    Weight Change: No new weight since admission. Please obtain weekly weights when feasible.    Nutrition Focused Physical Exam: Completed [ x  ]  Not Pertinent [   ]  -Severe malnutrition in the context of chronic illness, Please see note from 2/24 for further details.     Estimated energy needs: 39.8kg   ABW for estimated needs per clinical judgement as ABW is 66% of IBW, adjusted for malnutrition, chronic illness, age, and healthy weight gain.  Calories: 1400-1600kcals (35-40kcals/kg)  Protein: 60-70g (1.5-1.8g/kg)  Fluid: 1ml/kcal or per MD    Subjective:   71M Turkmen speaker, HFrEF (25-30%), Pre-DM (5.9), s/p robotic MidCab 2/9/22 at [facility], recently dc who presented to OSH 2/20 w AMS found to have acute MCA CVA, b/l acute infarctions largest in R basal ganglia and insular region - transferred to [facility] for further management - found also to have hypernatremia, GI ulceration for EGD/EUS 2/28, and Bladder mass c/f malignancy. Urology following and rec MRI vs CT renal protocol and rec outpatient cystoscopy. Ethics consult d/t lack of HCP, discussed GOC with patient's friend. PT recommend acute rehab once medically stable, SW following for d/c planning. SLP completed eval 2/24 and recommend soft and bite sized with mildly thick liquids. SLP followed up 2/28 and continues to recommend current consistency diet with strict aspiration precautions and 1:1 feeding assistance. Also, recommend FEES once pt more arousable. Pt now s/p s/p mechanical thrombectomy of R M1 occlusion, TICI 1- 3 3/3. Transferred to NSICU for further assessment. NGT replaced 3/4 overnight and EN feeds initiated.     On assessment, pt resting in bed comfortably. Pt noted to be lethargic and unable to speak. Currently NPO with EN feeds running- please see recs below to optimize nutr at this time. No n/v/d/c. No s/sx of aspiration noted. No abd distention present. Education deferred. Please see nutr recs below. RD to follow.     Previous Nutrition Diagnosis: Malnutrition (severe) R/T suspected inadequate protein-energy intake to meet nutrient needs AEB severe muscle and fat wasting     Active [  x ]  Resolved [   ]    If resolved, new PES:     Goal: Pt to meet at least 75% of nutritional needs during hospital stay consistently via tolerated route.    Recommendations:  1. NPO diet  >>Cont to advance diet per SLPs recommendation for consistency  >>pt required 1:1 feeding assistance p/t NPO status.   2. To optimize nutr at this time, recommend increasing EN to Jevity 1.5 @ 44 ml/hr x24hrs via NGT providing 1056 ml TV, 1584 kcal, 67g pro, 802 ml free water.   >>FWF per team  >>Maintain apsiration precautions at all time  3. Pain and bowel regimen per team   4. Cont to monitor lytes and replete prn      Education: N/A    Risk Level: High [ x  ] Moderate [   ] Low [   ]

## 2022-03-05 LAB
ANION GAP SERPL CALC-SCNC: 10 MMOL/L — SIGNIFICANT CHANGE UP (ref 5–17)
BUN SERPL-MCNC: 28 MG/DL — HIGH (ref 7–23)
CALCIUM SERPL-MCNC: 8.9 MG/DL — SIGNIFICANT CHANGE UP (ref 8.4–10.5)
CHLORIDE SERPL-SCNC: 113 MMOL/L — HIGH (ref 96–108)
CO2 SERPL-SCNC: 23 MMOL/L — SIGNIFICANT CHANGE UP (ref 22–31)
CREAT SERPL-MCNC: 0.76 MG/DL — SIGNIFICANT CHANGE UP (ref 0.5–1.3)
EGFR: 97 ML/MIN/1.73M2 — SIGNIFICANT CHANGE UP
GLUCOSE SERPL-MCNC: 159 MG/DL — HIGH (ref 70–99)
HCT VFR BLD CALC: 38.9 % — LOW (ref 39–50)
HGB BLD-MCNC: 12.1 G/DL — LOW (ref 13–17)
MAGNESIUM SERPL-MCNC: 2.3 MG/DL — SIGNIFICANT CHANGE UP (ref 1.6–2.6)
MCHC RBC-ENTMCNC: 30.3 PG — SIGNIFICANT CHANGE UP (ref 27–34)
MCHC RBC-ENTMCNC: 31.1 GM/DL — LOW (ref 32–36)
MCV RBC AUTO: 97.5 FL — SIGNIFICANT CHANGE UP (ref 80–100)
NRBC # BLD: 0 /100 WBCS — SIGNIFICANT CHANGE UP (ref 0–0)
PHOSPHATE SERPL-MCNC: 3.2 MG/DL — SIGNIFICANT CHANGE UP (ref 2.5–4.5)
PLATELET # BLD AUTO: 187 K/UL — SIGNIFICANT CHANGE UP (ref 150–400)
POTASSIUM SERPL-MCNC: 4 MMOL/L — SIGNIFICANT CHANGE UP (ref 3.5–5.3)
POTASSIUM SERPL-SCNC: 4 MMOL/L — SIGNIFICANT CHANGE UP (ref 3.5–5.3)
RBC # BLD: 3.99 M/UL — LOW (ref 4.2–5.8)
RBC # FLD: 14.6 % — HIGH (ref 10.3–14.5)
SODIUM SERPL-SCNC: 146 MMOL/L — HIGH (ref 135–145)
WBC # BLD: 6.82 K/UL — SIGNIFICANT CHANGE UP (ref 3.8–10.5)
WBC # FLD AUTO: 6.82 K/UL — SIGNIFICANT CHANGE UP (ref 3.8–10.5)

## 2022-03-05 PROCEDURE — 99233 SBSQ HOSP IP/OBS HIGH 50: CPT

## 2022-03-05 PROCEDURE — 70450 CT HEAD/BRAIN W/O DYE: CPT | Mod: 26

## 2022-03-05 PROCEDURE — 71045 X-RAY EXAM CHEST 1 VIEW: CPT | Mod: 26,76

## 2022-03-05 RX ORDER — SODIUM CHLORIDE 9 MG/ML
250 INJECTION INTRAMUSCULAR; INTRAVENOUS; SUBCUTANEOUS ONCE
Refills: 0 | Status: COMPLETED | OUTPATIENT
Start: 2022-03-05 | End: 2022-03-05

## 2022-03-05 RX ADMIN — SENNA PLUS 2 TABLET(S): 8.6 TABLET ORAL at 20:52

## 2022-03-05 RX ADMIN — PANTOPRAZOLE SODIUM 40 MILLIGRAM(S): 20 TABLET, DELAYED RELEASE ORAL at 11:22

## 2022-03-05 RX ADMIN — Medication 12.5 MILLIGRAM(S): at 07:11

## 2022-03-05 RX ADMIN — SODIUM CHLORIDE 250 MILLILITER(S): 9 INJECTION INTRAMUSCULAR; INTRAVENOUS; SUBCUTANEOUS at 11:49

## 2022-03-05 RX ADMIN — ATORVASTATIN CALCIUM 80 MILLIGRAM(S): 80 TABLET, FILM COATED ORAL at 20:52

## 2022-03-05 RX ADMIN — Medication 81 MILLIGRAM(S): at 11:22

## 2022-03-05 RX ADMIN — Medication 12.5 MILLIGRAM(S): at 18:03

## 2022-03-05 RX ADMIN — ENOXAPARIN SODIUM 30 MILLIGRAM(S): 100 INJECTION SUBCUTANEOUS at 20:52

## 2022-03-05 NOTE — PROGRESS NOTE ADULT - SUBJECTIVE AND OBJECTIVE BOX
OVERNIGHT EVENTS: LANETTE    SUBJECTIVE / INTERVAL HPI: Patient seen and examined at bedside. Patient not communicative with Mauritian  this morning. He did not open his eyes, but groaned to sternal rub. He squeezed with his R hand and wiggled his R. toe on command.     VITAL SIGNS:  Vital Signs Last 24 Hrs  T(C): 36.5 (05 Mar 2022 05:01), Max: 36.8 (04 Mar 2022 22:34)  T(F): 97.7 (05 Mar 2022 05:01), Max: 98.2 (04 Mar 2022 22:34)  HR: 92 (05 Mar 2022 08:24) (80 - 94)  BP: 132/84 (05 Mar 2022 08:24) (119/74 - 151/87)  BP(mean): 104 (05 Mar 2022 08:24) (88 - 112)  RR: 18 (05 Mar 2022 08:24) (15 - 30)  SpO2: 96% (05 Mar 2022 08:24) (95% - 100%)  I&O's Summary    04 Mar 2022 07:01  -  05 Mar 2022 07:00  --------------------------------------------------------  IN: 1330 mL / OUT: 570 mL / NET: 760 mL    05 Mar 2022 07:01  -  05 Mar 2022 09:14  --------------------------------------------------------  IN: 30 mL / OUT: 0 mL / NET: 30 mL        PHYSICAL EXAM:    General: NAD  HEENT: dry mucous membranes, NGT in place  Cardiovascular: +S1/S2; RRR, no murmur appreciated  Respiratory: CTA B/L; no W/R/R  Gastrointestinal: soft, NT/ND; +BSx4  Extremities: WWP; no edema   Vascular: 2+ radial, DP/PT pulses B/L    Neurologic:  -Mental status: Stuporous. Does not follow commands. Does not ppens eyes spontaneously this morning  -Cranial nerves:   III, IV, VI: Pupils equally round and reactive to light. Eyes remain fixed on examiner during head turning.  Motor: Decreased bulk. Right side moves spontaneously 2+/5 and squeezed R. hand and wiggled R. toes on command. LUE 0/5 LLE 0/5.  Sensation: Grimaces to painful stimuli x4.    MEDICATIONS:  MEDICATIONS  (STANDING):  aspirin  chewable 81 milliGRAM(s) Oral daily  atorvastatin 80 milliGRAM(s) Oral at bedtime  enoxaparin Injectable 30 milliGRAM(s) SubCutaneous every 24 hours  metoprolol tartrate 12.5 milliGRAM(s) Oral two times a day  pantoprazole   Suspension 40 milliGRAM(s) Oral daily  senna 2 Tablet(s) Oral at bedtime    MEDICATIONS  (PRN):  acetaminophen    Suspension .. 650 milliGRAM(s) Oral every 6 hours PRN Temp greater or equal to 38C (100.4F), Mild Pain (1 - 3)  polyethylene glycol 3350 17 Gram(s) Oral daily PRN Constipation      ALLERGIES:  Allergies    No Known Allergies    Intolerances        LABS:                        12.1   6.82  )-----------( 187      ( 05 Mar 2022 06:42 )             38.9     03-05    146<H>  |  113<H>  |  28<H>  ----------------------------<  159<H>  4.0   |  23  |  0.76    Ca    8.9      05 Mar 2022 06:42  Phos  3.2     03-05  Mg     2.3     03-05      PT/INR - ( 03 Mar 2022 12:55 )   PT: 12.5 sec;   INR: 1.05          PTT - ( 03 Mar 2022 12:55 )  PTT:31.4 sec    CAPILLARY BLOOD GLUCOSE  72 (03 Mar 2022 09:29)          RADIOLOGY & ADDITIONAL TESTS: Reviewed.

## 2022-03-05 NOTE — PROGRESS NOTE ADULT - ASSESSMENT
71M Albanian speaking smoker (very recently quit, smoked x 40 years) with PMHx of CHF (EF 25-30%), prediabetes, s/p robotic MIDCAB with Dr. Santos on 02/09/2022 transferred from OSH on 2/20/2022 presenting with AMS, MRI revealed acute MCA CVA, b/l acute infarcts largest in R basal ganglia and insular region. Transferred to [facility] for further workup. Currently s/p R M1 thrombectomy 3/3/22.     NEURO  #CVA  - neuro checks/vital signs q4hr  - post thrombectomy CTH 3/3 shows some contrast staining R >L, no hemorrhage noted   - Continue ASA 81mg PO daily   - Continue Atorvastatin 80mg PO daily  - pending TTE  - NCHCT today  - c/w  normotension  - F/u with palliative and ethics regarding GOC and decision making by roommate  - pending PT/OT assessment   - A1C: 5.4%  - LDL: 90    CARDIO  - -150  - echo 2/24: EF 20%  - pending TTE with bubble study (last echo on 2/24)   - continue 12.5mg lopressor BID per NGT     GI/:  #GI mass  - NGT inserted, on tube feeds  - GI will follow outpatient for stomach mass  -  following for bladder mass  - evans; keep during hospitalization per urology, will need outpatient follow-up for bladder mass   - bowel regimen   - cont home med protonix    RENAL  #Hypernatremia  - Na 146 this morning while on tube feeds, discontinued free water flushes  - daily bmp    DVT prophylaxis   - Lovenox SQ in addition to SCDs

## 2022-03-05 NOTE — PROGRESS NOTE ADULT - SUBJECTIVE AND OBJECTIVE BOX
Physical Medicine and Rehabilitation Progress Note:    Patient is a 71y old  Male who presents with a chief complaint of CVA (05 Mar 2022 09:13)      HPI:  This is a 71 year old English speaking male, smoker (very recently quit, smoked x 40 years) with PMHx of CHF (EF 25-30%) and prediabetes (A1c 5.9) who is now s/p robotic MIDCAB with Dr. Santos on 02/09/2022.  Post OR he was extubated on POD 0, started on beta blockers by POD 2 and transferred to telemetry.  By POD 4 he was ambulating independently, on room air, having normal BM's, tolerating PO diet with surgical pain under control. He was discharged home on POD 4.  On 02/20/2022 patient presented to OSH with AMS. Work up included CT without, CTA Head and Neck and MRI Jerry showing acute MCA CVA, b/l acute inracts  with the largest in the right basal ganglia and insular region. EEG negative for seizure. TTE = ? PFO. Patient transfer to [facility] for further MGMT and work up.      PMH/PSH:   See HPI.    SH:  40 years of 1ppd smoking, stopped 01/2022  Alcohol: 1 drink per night, stopped 01/2022  Elicit drugs: No  Occupation: ?  Home: Yes    FH:  N/A    Allergies:   See above.     Home Rx.:   See below. (23 Feb 2022 17:49)                            12.1   6.82  )-----------( 187      ( 05 Mar 2022 06:42 )             38.9       03-05    146<H>  |  113<H>  |  28<H>  ----------------------------<  159<H>  4.0   |  23  |  0.76    Ca    8.9      05 Mar 2022 06:42  Phos  3.2     03-05  Mg     2.3     03-05      Vital Signs Last 24 Hrs  T(C): 36.7 (05 Mar 2022 09:23), Max: 36.8 (04 Mar 2022 22:34)  T(F): 98.1 (05 Mar 2022 09:23), Max: 98.2 (04 Mar 2022 22:34)  HR: 68 (05 Mar 2022 11:34) (68 - 94)  BP: 107/57 (05 Mar 2022 11:34) (107/57 - 151/87)  BP(mean): 77 (05 Mar 2022 11:34) (77 - 112)  RR: 16 (05 Mar 2022 11:34) (16 - 30)  SpO2: 100% (05 Mar 2022 11:34) (95% - 100%)    MEDICATIONS  (STANDING):  aspirin  chewable 81 milliGRAM(s) Oral daily  atorvastatin 80 milliGRAM(s) Oral at bedtime  enoxaparin Injectable 30 milliGRAM(s) SubCutaneous every 24 hours  metoprolol tartrate 12.5 milliGRAM(s) Oral two times a day  pantoprazole   Suspension 40 milliGRAM(s) Oral daily  senna 2 Tablet(s) Oral at bedtime    MEDICATIONS  (PRN):  acetaminophen    Suspension .. 650 milliGRAM(s) Oral every 6 hours PRN Temp greater or equal to 38C (100.4F), Mild Pain (1 - 3)  polyethylene glycol 3350 17 Gram(s) Oral daily PRN Constipation    Currently Undergoing Physical/ Occupational Therapy at bedside.    PT/OT Functional Status Assessment:   3/4/2022        Cognitive/Neuro/Behavioral  Level of Consciousness: obtunded  Arousal Level: arouses to vigorous stimulation;  arouses to repeated stimulation  Orientation: CHATO  Speech: unable to speak  Mood/Behavior: calm    Language Assistance  Preferred Language to Address Healthcare Preferred Language to Address Healthcare: Japanese  's name: Domonique   Patient/Caregiver offered   services: yes  Patient/Caregiver accepted  services: yes  Date/Time of acceptance(dd-mmm-yyyy hh:mm): 04-Mar-2022 11:10    ID: [id number]     Therapeutic Interventions      Bed Mobility  Bed Mobility Training Rolling/Turning: dependent (less than 25% patient effort);  2 person assist;  verbal cues;  nonverbal cues (demo/gestures)  Bed Mobility Training Scooting: dependent (less than 25% patient effort);  2 person assist;  verbal cues;  nonverbal cues (demo/gestures)  Bed Mobility Training Sit-to-Supine: dependent (less than 25% patient effort);  verbal cues;  nonverbal cues (demo/gestures);  2 person assist  Bed Mobility Training Supine-to-Sit: dependent (less than 25% patient effort);  2 person assist;  nonverbal cues (demo/gestures);  verbal cues;  HOB elevated ~45 degrees   Bed Mobility Training Limitations: decreased ability to use arms for pushing/pulling;  decreased ability to use legs for bridging/pushing;  impaired ability to control trunk for mobility;  abnormal muscle tone;  decreased strength;  impaired balance;  impaired motor control;  impaired postural control;  cognitive, decreased safety awareness;  Demo impaired strength with bed mobility, benefiting from second person assist for trunk control and BLE advancment 2/2 L side weakness     Sit-Stand Transfer Training  Sit-to-Stand Transfer Training Treatment not Performed: deferred 2/2 poor sitting balance and impaired cognitive status    Therapeutic Exercise  Therapeutic Exercise Detail: Sitting EOB ~15 min with max A x2 // R elbow flexion for face hygiene ~5reps with OT Christine, attempted R knee extension and R hip flexion x3 reps (unable to obtain full ROM) with some initiation noted Able to follow <25% commands inconsistently with RUE and RLE. Spontaneous, non-purposeful movement noted with RUE and RLE. R shoulder flexion 3/5 strength, R elbow flexion 3/5, R knee flexion and extension 3/5 strength, R hip flexion strength 3/5, R DF/PF 2+/5 strength as assessed visually through functional mobility. Trace muscle contraction noted (1/5 strength) for L hand , otherwise 0/5 strength for LUE and LLE. Attempted to perform CN testing, however unable to 2/2 poor command following.         Grooming Training  Grooming Training Assistance: Patient able to maintain gross grasp with R hand on face cloth and bring to R ear when asked. Unable to wipe eyes, nose, and/or L side of face on command. ;  cognitive, decreased safety awareness;  impaired postural control;  impaired balance;  impaired coordination;  abnormal muscle tone    OT Cognitive Treatment  OT Treatment: Cognitive Charges: patient able to follow <25% of single step commands. Patient with decreased arosual, eye closed throughout session, despite max verbal cues to arouse. Unable to test orientation as patient non verbal this session.           PM&R Impression: as above    Current Disposition Plan Recommendations:    subacute rehab placement

## 2022-03-05 NOTE — PROGRESS NOTE ADULT - ASSESSMENT
per Neurology    71 y o M Lao speaking smoker (very recently quit, smoked x 40 years) with PMHx of CHF (EF 25-30%), prediabetes, s/p robotic MIDCAB with Dr. Santos on 02/09/2022 transferred from OSH on 2/20/2022 presenting with AMS, MRI revealed acute MCA CVA, b/l acute infarcts largest in R basal ganglia and insular region. Transferred to [facility] for further workup. Currently s/p R M1 thrombectomy 3/3/22.     NEURO  #CVA  - neuro checks/vital signs q4hr  - post thrombectomy CTH 3/3 shows some contrast staining R >L, no hemorrhage noted   - Continue ASA 81mg PO daily   - Continue Atorvastatin 80mg PO daily  - pending TTE  - NCHCT today  - c/w  normotension  - F/u with palliative and ethics regarding GOC and decision making by roommate  - pending PT/OT assessment   - A1C: 5.4%  - LDL: 90    CARDIO  - -150  - echo 2/24: EF 20%  - pending TTE with bubble study (last echo on 2/24)   - continue 12.5mg lopressor BID per NGT     GI/:  #GI mass  - NGT inserted, on tube feeds  - GI will follow outpatient for stomach mass  -  following for bladder mass  - evans; keep during hospitalization per urology, will need outpatient follow-up for bladder mass   - bowel regimen   - cont home med protonix    RENAL  #Hypernatremia  - Na 146 this morning while on tube feeds, discontinued free water flushes  - daily bmp    DVT prophylaxis   - Lovenox SQ in addition to SCDs

## 2022-03-06 LAB
ANION GAP SERPL CALC-SCNC: 10 MMOL/L — SIGNIFICANT CHANGE UP (ref 5–17)
BUN SERPL-MCNC: 22 MG/DL — SIGNIFICANT CHANGE UP (ref 7–23)
CALCIUM SERPL-MCNC: 9 MG/DL — SIGNIFICANT CHANGE UP (ref 8.4–10.5)
CHLORIDE SERPL-SCNC: 117 MMOL/L — HIGH (ref 96–108)
CO2 SERPL-SCNC: 22 MMOL/L — SIGNIFICANT CHANGE UP (ref 22–31)
CREAT SERPL-MCNC: 0.68 MG/DL — SIGNIFICANT CHANGE UP (ref 0.5–1.3)
EGFR: 101 ML/MIN/1.73M2 — SIGNIFICANT CHANGE UP
GLUCOSE BLDC GLUCOMTR-MCNC: 146 MG/DL — HIGH (ref 70–99)
GLUCOSE BLDC GLUCOMTR-MCNC: 149 MG/DL — HIGH (ref 70–99)
GLUCOSE BLDC GLUCOMTR-MCNC: 96 MG/DL — SIGNIFICANT CHANGE UP (ref 70–99)
GLUCOSE SERPL-MCNC: 123 MG/DL — HIGH (ref 70–99)
HCT VFR BLD CALC: 39.3 % — SIGNIFICANT CHANGE UP (ref 39–50)
HGB BLD-MCNC: 12.3 G/DL — LOW (ref 13–17)
MAGNESIUM SERPL-MCNC: 2.4 MG/DL — SIGNIFICANT CHANGE UP (ref 1.6–2.6)
MCHC RBC-ENTMCNC: 30.6 PG — SIGNIFICANT CHANGE UP (ref 27–34)
MCHC RBC-ENTMCNC: 31.3 GM/DL — LOW (ref 32–36)
MCV RBC AUTO: 97.8 FL — SIGNIFICANT CHANGE UP (ref 80–100)
NRBC # BLD: 0 /100 WBCS — SIGNIFICANT CHANGE UP (ref 0–0)
PHOSPHATE SERPL-MCNC: 3.4 MG/DL — SIGNIFICANT CHANGE UP (ref 2.5–4.5)
PLATELET # BLD AUTO: 169 K/UL — SIGNIFICANT CHANGE UP (ref 150–400)
POTASSIUM SERPL-MCNC: 4.4 MMOL/L — SIGNIFICANT CHANGE UP (ref 3.5–5.3)
POTASSIUM SERPL-SCNC: 4.4 MMOL/L — SIGNIFICANT CHANGE UP (ref 3.5–5.3)
RBC # BLD: 4.02 M/UL — LOW (ref 4.2–5.8)
RBC # FLD: 15 % — HIGH (ref 10.3–14.5)
SODIUM SERPL-SCNC: 149 MMOL/L — HIGH (ref 135–145)
WBC # BLD: 6.48 K/UL — SIGNIFICANT CHANGE UP (ref 3.8–10.5)
WBC # FLD AUTO: 6.48 K/UL — SIGNIFICANT CHANGE UP (ref 3.8–10.5)

## 2022-03-06 PROCEDURE — 99233 SBSQ HOSP IP/OBS HIGH 50: CPT

## 2022-03-06 RX ORDER — INSULIN HUMAN 100 [IU]/ML
INJECTION, SOLUTION SUBCUTANEOUS EVERY 6 HOURS
Refills: 0 | Status: DISCONTINUED | OUTPATIENT
Start: 2022-03-06 | End: 2022-03-07

## 2022-03-06 RX ORDER — INSULIN LISPRO 100/ML
VIAL (ML) SUBCUTANEOUS
Refills: 0 | Status: DISCONTINUED | OUTPATIENT
Start: 2022-03-06 | End: 2022-03-06

## 2022-03-06 RX ORDER — SODIUM CHLORIDE 9 MG/ML
1000 INJECTION, SOLUTION INTRAVENOUS
Refills: 0 | Status: DISCONTINUED | OUTPATIENT
Start: 2022-03-06 | End: 2022-03-07

## 2022-03-06 RX ORDER — DEXTROSE 50 % IN WATER 50 %
15 SYRINGE (ML) INTRAVENOUS ONCE
Refills: 0 | Status: DISCONTINUED | OUTPATIENT
Start: 2022-03-06 | End: 2022-03-07

## 2022-03-06 RX ORDER — DEXTROSE 50 % IN WATER 50 %
25 SYRINGE (ML) INTRAVENOUS ONCE
Refills: 0 | Status: DISCONTINUED | OUTPATIENT
Start: 2022-03-06 | End: 2022-03-07

## 2022-03-06 RX ORDER — DEXTROSE 50 % IN WATER 50 %
12.5 SYRINGE (ML) INTRAVENOUS ONCE
Refills: 0 | Status: DISCONTINUED | OUTPATIENT
Start: 2022-03-06 | End: 2022-03-07

## 2022-03-06 RX ORDER — DEXTROSE 50 % IN WATER 50 %
25 SYRINGE (ML) INTRAVENOUS ONCE
Refills: 0 | Status: DISCONTINUED | OUTPATIENT
Start: 2022-03-06 | End: 2022-03-08

## 2022-03-06 RX ORDER — GLUCAGON INJECTION, SOLUTION 0.5 MG/.1ML
1 INJECTION, SOLUTION SUBCUTANEOUS ONCE
Refills: 0 | Status: DISCONTINUED | OUTPATIENT
Start: 2022-03-06 | End: 2022-03-07

## 2022-03-06 RX ADMIN — Medication 12.5 MILLIGRAM(S): at 05:37

## 2022-03-06 RX ADMIN — PANTOPRAZOLE SODIUM 40 MILLIGRAM(S): 20 TABLET, DELAYED RELEASE ORAL at 11:07

## 2022-03-06 RX ADMIN — Medication 12.5 MILLIGRAM(S): at 17:33

## 2022-03-06 RX ADMIN — ATORVASTATIN CALCIUM 80 MILLIGRAM(S): 80 TABLET, FILM COATED ORAL at 22:52

## 2022-03-06 RX ADMIN — ENOXAPARIN SODIUM 30 MILLIGRAM(S): 100 INJECTION SUBCUTANEOUS at 22:52

## 2022-03-06 RX ADMIN — SENNA PLUS 2 TABLET(S): 8.6 TABLET ORAL at 22:52

## 2022-03-06 RX ADMIN — Medication 81 MILLIGRAM(S): at 11:07

## 2022-03-06 NOTE — PROGRESS NOTE ADULT - ASSESSMENT
71M Albanian speaking smoker (very recently quit, smoked x 40 years) with PMHx of CHF (EF 25-30%), prediabetes, s/p robotic MIDCAB with Dr. Santos on 02/09/2022 transferred from OSH on 2/20/2022 presenting with AMS, MRI revealed acute MCA CVA, b/l acute infarcts largest in R basal ganglia and insular region. Transferred to [facility] for further workup. Currently s/p R M1 thrombectomy 3/3/22.     NEURO  #CVA  - neuro checks/vital signs q4hr  - post thrombectomy CTH 3/3 shows some contrast staining R >L, no hemorrhage noted   - Continue ASA 81mg PO daily   - Continue Atorvastatin 80mg PO daily  - pending TTE w bubble  - NCHCT today  - c/w  normotension  - F/u with palliative and ethics regarding GOC and decision making by roommate   - A1C: 5.4%  - LDL: 90    CARDIO  - -150  - echo 2/24: EF 20%  - pending TTE with bubble study (last echo on 2/24)   - continue 12.5mg lopressor BID per NGT, can transition to toprol 25mg daily PRN per cardiology   - can start entresto PRN per cardiology     GI/:  #GI mass  - NGT inserted, on tube feeds  - GI will follow outpatient for stomach mass  -  will follow outpatient for bladder mass  - evans; keep during hospitalization per urology, will need outpatient follow-up for bladder mass   - bowel regimen   - cont home med protonix    RENAL  #Hypernatremia  - Na 149 this morning while on tube feeds (FWD 1.3L), started FWF 300cc q8H  - daily bmp    DVT prophylaxis   - Lovenox SQ in addition to SCDs    Dispo: pending palliative/GOC  Recommendations preliminary until attending attestation

## 2022-03-06 NOTE — PROGRESS NOTE ADULT - SUBJECTIVE AND OBJECTIVE BOX
***INCOMPLETE***  NEUROLOGY CONSULT: PROGRESS NOTE    INTERVAL HISTORY:      SUBJECTIVE:      MEDICATIONS:  acetaminophen    Suspension .. 650 milliGRAM(s) Oral every 6 hours PRN  aspirin  chewable 81 milliGRAM(s) Oral daily  atorvastatin 80 milliGRAM(s) Oral at bedtime  enoxaparin Injectable 30 milliGRAM(s) SubCutaneous every 24 hours  metoprolol tartrate 12.5 milliGRAM(s) Oral two times a day  pantoprazole   Suspension 40 milliGRAM(s) Oral daily  polyethylene glycol 3350 17 Gram(s) Oral daily PRN  senna 2 Tablet(s) Oral at bedtime    VITAL SIGNS:  Vital Signs Last 24 Hrs  T(C): 36.2 (06 Mar 2022 06:19), Max: 36.8 (06 Mar 2022 01:20)  T(F): 97.2 (06 Mar 2022 06:19), Max: 98.2 (06 Mar 2022 01:20)  HR: 92 (06 Mar 2022 04:26) (68 - 108)  BP: 147/82 (06 Mar 2022 04:26) (91/60 - 147/82)  BP(mean): 108 (06 Mar 2022 04:26) (69 - 108)  RR: 18 (06 Mar 2022 04:26) (16 - 22)  SpO2: 97% (06 Mar 2022 04:26) (96% - 100%)    PHYSICAL EXAMINATION:  General: Comfortable, pleasant/anxious/agitated, Ill-appearing, well-nourished/frail/cachectic, comfortable / in distress  Neurologic:     -Mental Status: AAOx3. Speech is fluent with intact naming, repetition, and comprehension, no dysarthria. Recent and remote memory intact. Follows commands. Attention/concentration intact. Fund of knowledge appropriate.     -Cranial Nerves:          II: Visual fields are full to confrontation.          III, IV, VI: EOMI without nystagmus. PERRL b/l          V:  Facial sensation V1-V3 equal and intact           VII: Face is symmetric with normal eye closure and smile          VIII: Hearing is bilaterally intact to finger rub          IX, X: Uvula is midline and soft palate rises symmetrically          XI: Head turning and shoulder shrug are intact.          XII: Tongue protrudes midline     -Motor: Normal bulk and tone. No involuntary movements (tremor, myoclonus, chorea, athetosis, dystonia)          Upper extremities: shoulder abduction, elbow flexion/extension, wrist flexion/extension, handgrip          Lower extremities: hip flexion, knee extension/flexion, plantar flexion, ankle dorsiflexion          No pronator drift. Rapid alternating movements intact and symmetric     -Sensation: Intact to light touch. Pain and temperature intact. Vibration sense intact.           No neglect or extinction on double simultaneous testing.     -Coordination: No dysmetria on finger-to-nose and heel-to-shin intact bilaterally, rapid alternating hand movements intact     -Reflexes: 2+ and symmetric at biceps, triceps, brachioradialis, patellar, ankles          Plantar reflexes downgoing bilaterally. Downgoing toes bilaterally      -Gait: narrow and steady      LABS:                          12.3   6.48  )-----------( 169      ( 06 Mar 2022 06:38 )             39.3     03-06    149<H>  |  117<H>  |  22  ----------------------------<  123<H>  4.4   |  22  |  0.68    Ca    9.0      06 Mar 2022 06:38  Phos  3.4     03-06  Mg     2.4     03-06            RADIOLOGY & ADDITIONAL STUDIES:  reviewed NEUROLOGY CONSULT: PROGRESS NOTE    INTERVAL HISTORY:  NAEO    SUBJECTIVE:  Indonesian  used, patient somnolent and not following many commands outside of squeezing hands bilaterally. Unchanged from yesterday.     MEDICATIONS:  acetaminophen    Suspension .. 650 milliGRAM(s) Oral every 6 hours PRN  aspirin  chewable 81 milliGRAM(s) Oral daily  atorvastatin 80 milliGRAM(s) Oral at bedtime  enoxaparin Injectable 30 milliGRAM(s) SubCutaneous every 24 hours  metoprolol tartrate 12.5 milliGRAM(s) Oral two times a day  pantoprazole   Suspension 40 milliGRAM(s) Oral daily  polyethylene glycol 3350 17 Gram(s) Oral daily PRN  senna 2 Tablet(s) Oral at bedtime    VITAL SIGNS:  Vital Signs Last 24 Hrs  T(C): 36.2 (06 Mar 2022 06:19), Max: 36.8 (06 Mar 2022 01:20)  T(F): 97.2 (06 Mar 2022 06:19), Max: 98.2 (06 Mar 2022 01:20)  HR: 92 (06 Mar 2022 04:26) (68 - 108)  BP: 147/82 (06 Mar 2022 04:26) (91/60 - 147/82)  BP(mean): 108 (06 Mar 2022 04:26) (69 - 108)  RR: 18 (06 Mar 2022 04:26) (16 - 22)  SpO2: 97% (06 Mar 2022 04:26) (96% - 100%)    PHYSICAL EXAMINATION:  General: NAD  HEENT: dry mucous membranes, NGT in place  Cardiovascular: +S1/S2; RRR, no murmur appreciated  Respiratory: CTA B/L; no W/R/R  Gastrointestinal: soft, NT/ND; +BSx4  Extremities: WWP; no edema   Vascular: 2+ radial, DP/PT pulses B/L    Neurologic:  -Mental status: Stuporous. Does not follow commands outside of squeezing hands. Does not open eyes spontaneously this morning  -Cranial nerves:   III, IV, VI: Pupils equally round and reactive to light. Eyes remain fixed on examiner during head turning.  Motor: Decreased bulk. Right side moves spontaneously 2+/5 and squeezed R. hand and wiggled R. toes on command. LUE 0/5 LLE 0/5.  Sensation: Grimaces to painful stimuli x4.      LABS:                          12.3   6.48  )-----------( 169      ( 06 Mar 2022 06:38 )             39.3     03-06    149<H>  |  117<H>  |  22  ----------------------------<  123<H>  4.4   |  22  |  0.68    Ca    9.0      06 Mar 2022 06:38  Phos  3.4     03-06  Mg     2.4     03-06            RADIOLOGY & ADDITIONAL STUDIES:  reviewed

## 2022-03-07 LAB
ANION GAP SERPL CALC-SCNC: 11 MMOL/L — SIGNIFICANT CHANGE UP (ref 5–17)
BASOPHILS # BLD AUTO: 0.05 K/UL — SIGNIFICANT CHANGE UP (ref 0–0.2)
BASOPHILS NFR BLD AUTO: 0.8 % — SIGNIFICANT CHANGE UP (ref 0–2)
BUN SERPL-MCNC: 23 MG/DL — SIGNIFICANT CHANGE UP (ref 7–23)
CALCIUM SERPL-MCNC: 8.9 MG/DL — SIGNIFICANT CHANGE UP (ref 8.4–10.5)
CHLORIDE SERPL-SCNC: 114 MMOL/L — HIGH (ref 96–108)
CO2 SERPL-SCNC: 24 MMOL/L — SIGNIFICANT CHANGE UP (ref 22–31)
CREAT SERPL-MCNC: 0.59 MG/DL — SIGNIFICANT CHANGE UP (ref 0.5–1.3)
EGFR: 105 ML/MIN/1.73M2 — SIGNIFICANT CHANGE UP
EOSINOPHIL # BLD AUTO: 0.43 K/UL — SIGNIFICANT CHANGE UP (ref 0–0.5)
EOSINOPHIL NFR BLD AUTO: 6.5 % — HIGH (ref 0–6)
GLUCOSE BLDC GLUCOMTR-MCNC: 126 MG/DL — HIGH (ref 70–99)
GLUCOSE BLDC GLUCOMTR-MCNC: 135 MG/DL — HIGH (ref 70–99)
GLUCOSE SERPL-MCNC: 136 MG/DL — HIGH (ref 70–99)
HCT VFR BLD CALC: 35.7 % — LOW (ref 39–50)
HGB BLD-MCNC: 11.7 G/DL — LOW (ref 13–17)
IMM GRANULOCYTES NFR BLD AUTO: 0.3 % — SIGNIFICANT CHANGE UP (ref 0–1.5)
LYMPHOCYTES # BLD AUTO: 1.08 K/UL — SIGNIFICANT CHANGE UP (ref 1–3.3)
LYMPHOCYTES # BLD AUTO: 16.2 % — SIGNIFICANT CHANGE UP (ref 13–44)
MAGNESIUM SERPL-MCNC: 2.3 MG/DL — SIGNIFICANT CHANGE UP (ref 1.6–2.6)
MCHC RBC-ENTMCNC: 31.5 PG — SIGNIFICANT CHANGE UP (ref 27–34)
MCHC RBC-ENTMCNC: 32.8 GM/DL — SIGNIFICANT CHANGE UP (ref 32–36)
MCV RBC AUTO: 96.2 FL — SIGNIFICANT CHANGE UP (ref 80–100)
MONOCYTES # BLD AUTO: 0.57 K/UL — SIGNIFICANT CHANGE UP (ref 0–0.9)
MONOCYTES NFR BLD AUTO: 8.6 % — SIGNIFICANT CHANGE UP (ref 2–14)
NEUTROPHILS # BLD AUTO: 4.51 K/UL — SIGNIFICANT CHANGE UP (ref 1.8–7.4)
NEUTROPHILS NFR BLD AUTO: 67.6 % — SIGNIFICANT CHANGE UP (ref 43–77)
NRBC # BLD: 0 /100 WBCS — SIGNIFICANT CHANGE UP (ref 0–0)
PLATELET # BLD AUTO: 194 K/UL — SIGNIFICANT CHANGE UP (ref 150–400)
POTASSIUM SERPL-MCNC: 3.9 MMOL/L — SIGNIFICANT CHANGE UP (ref 3.5–5.3)
POTASSIUM SERPL-SCNC: 3.9 MMOL/L — SIGNIFICANT CHANGE UP (ref 3.5–5.3)
RBC # BLD: 3.71 M/UL — LOW (ref 4.2–5.8)
RBC # FLD: 15.1 % — HIGH (ref 10.3–14.5)
SODIUM SERPL-SCNC: 149 MMOL/L — HIGH (ref 135–145)
WBC # BLD: 6.66 K/UL — SIGNIFICANT CHANGE UP (ref 3.8–10.5)
WBC # FLD AUTO: 6.66 K/UL — SIGNIFICANT CHANGE UP (ref 3.8–10.5)

## 2022-03-07 PROCEDURE — 99233 SBSQ HOSP IP/OBS HIGH 50: CPT

## 2022-03-07 PROCEDURE — 99233 SBSQ HOSP IP/OBS HIGH 50: CPT | Mod: GC

## 2022-03-07 PROCEDURE — 99498 ADVNCD CARE PLAN ADDL 30 MIN: CPT | Mod: 25

## 2022-03-07 PROCEDURE — 99497 ADVNCD CARE PLAN 30 MIN: CPT | Mod: 25

## 2022-03-07 RX ORDER — MORPHINE SULFATE 50 MG/1
2 CAPSULE, EXTENDED RELEASE ORAL
Refills: 0 | Status: DISCONTINUED | OUTPATIENT
Start: 2022-03-07 | End: 2022-03-08

## 2022-03-07 RX ORDER — METOPROLOL TARTRATE 50 MG
2.5 TABLET ORAL EVERY 12 HOURS
Refills: 0 | Status: DISCONTINUED | OUTPATIENT
Start: 2022-03-07 | End: 2022-03-08

## 2022-03-07 RX ORDER — ROBINUL 0.2 MG/ML
0.2 INJECTION INTRAMUSCULAR; INTRAVENOUS EVERY 6 HOURS
Refills: 0 | Status: DISCONTINUED | OUTPATIENT
Start: 2022-03-07 | End: 2022-03-08

## 2022-03-07 RX ADMIN — Medication 12.5 MILLIGRAM(S): at 06:43

## 2022-03-07 RX ADMIN — PANTOPRAZOLE SODIUM 40 MILLIGRAM(S): 20 TABLET, DELAYED RELEASE ORAL at 12:24

## 2022-03-07 RX ADMIN — Medication 2.5 MILLIGRAM(S): at 17:31

## 2022-03-07 RX ADMIN — Medication 81 MILLIGRAM(S): at 12:24

## 2022-03-07 NOTE — PROGRESS NOTE ADULT - ASSESSMENT
71M Irish speaking smoker (very recently quit, smoked x 40 years) with PMHx of CHF (EF 25-30%), prediabetes, s/p robotic MIDCAB with Dr. Santos on 02/09/2022 transferred from OSH on 2/20/2022 presenting with AMS, MRI revealed acute MCA CVA, b/l acute infarcts largest in R basal ganglia and insular region. Transferred to [facility] for further workup. Course has been complicated by hypernatremia, known bladder and gastric masses (no inpatient intervention by  and GI. Further complicated by acute right MCA stroke s/p R M1 thrombectomy 3/3/22, with further deterioration of mental status. Currently with NG tube, palliative on board, also ethics since he has no health care proxy.     NEURO  #CVA  - neuro checks/vital signs q4hr  - post thrombectomy CTH 3/3 shows some contrast staining R >L, no hemorrhage noted   - Continue ASA 81mg PO daily   - Continue Atorvastatin 80mg PO daily  - TTE w bubble - EF 20%, no shunt  - NCHCT most recently 3/5 with improvement of contrast staining, evolving right MCA stroke, also stable bilateral strokes compared to other CTs  - c/w  normotension  - F/u with palliative and ethics regarding GOC and decision making by roommate - roommate does not want to continue making medical decisions. 2PC consent for DNR status  - A1C: 5.4%  - LDL: 90    CARDIO  - -150  - echo 2/24: EF 20%  - continue 12.5mg lopressor BID per NGT, can transition to toprol 25mg daily PRN per cardiology   - can start entresto PRN per cardiology     GI/:  #GI mass  - NGT inserted, on tube feeds  - GI rec follow outpatient for stomach mass  -  rec follow outpatient for bladder mass  - evans; keep during hospitalization per urology, will need outpatient follow-up for bladder mass   - bowel regimen   - cont home med protonix    RENAL  #Hypernatremia  - Na 149 this morning while on tube feeds (FWD 1.3L), started FWF 300cc q8H  - daily bmp    DVT prophylaxis   - Lovenox SQ in addition to SCDs    Dispo: pending palliative/GOC - DNR for now    Pt discussed during rounds with Dr. Rhys VAILR Goals: Goals reviewed at interdisciplinary rounds with case management, social work, physical therapy, occupational therapy, and speech language pathology.   Please see specific therapy  notes for in depth goals.  Dispo: rehab    Discussed daily hospital plans and goals with patient and roommate at bedside.

## 2022-03-07 NOTE — CONSULT NOTE ADULT - PROVIDER SPECIALTY LIST ADULT
Ethics
Heart Failure
Urology
Neurology
Ethics
Gastroenterology
Hospitalist
Rehab Medicine
Palliative Care

## 2022-03-07 NOTE — PROGRESS NOTE ADULT - PROBLEM SELECTOR PLAN 4
- Patient with catastrophic strokes, heart failure, and likely underlying malignancy.  - GOC discussion as below.
- Patient with catastrophic strokes, heart failure, and likely underlying malignancy.  - GOC discussion as below.

## 2022-03-07 NOTE — PROGRESS NOTE ADULT - TIME BILLING
review of patient information including recent vital signs, labs, imaging, and notes; assessing, examining patient; updating patient/family; discussion and coordination of care with multidisciplinary team.
Review of objective data, notes in chart; interviewed/examined patient and discussed assessment/plan with patient and multidisciplinary team
review of patient information including recent vital signs, labs, imaging, and notes; assessing, examining patient; updating patient/family; discussion and coordination of care with multidisciplinary team.
review of patient information including recent vital signs, labs, imaging, and notes; assessing, examining patient; updating patient/family; discussion and coordination of care with multidisciplinary team.

## 2022-03-07 NOTE — PROGRESS NOTE ADULT - PROBLEM SELECTOR PLAN 1
- Patient with numerous catastrophic CVA's.  - Evidence of neurologic decompensation and no overall improvement.  - Prognosis is extremely poor. Chance of neurologic improvement is slim to none.
- Patient with numerous catastrophic CVA's.  - Evidence of neurologic decompensation and no overall improvement.  - Prognosis is extremely poor. Chance of neurologic improvement is slim to none.

## 2022-03-07 NOTE — CONSULT NOTE ADULT - CONSULT REASON
Stroke,  Dysphagia  Complex Decision Making
Co-management
L MCA stroke
bladder mass
PM&R evaluation
To assist the healthcare team in an ethical dilemma of a 71-year-old male admitted for further management and workup of cerebral vascular accident, currently with guarded prognosis and unclear surrogacy.
Abnormal imaging
To assist the healthcare team in an ethical dilemma of a 71-year-old male admitted for further management and workup of cerebral vascular accident, currently with poor prognosis, regarding goals of care.
HFrEF

## 2022-03-07 NOTE — CHART NOTE - NSCHARTNOTESELECT_GEN_ALL_CORE
Event Note
Follow-up/Nutrition Services
Palliative Care Attending
Follow Up Nutrition Assessment/Nutrition Services

## 2022-03-07 NOTE — CONSULT NOTE ADULT - ASSESSMENT
Recommendation: Consistent with the ethical principle of beneficence, the clinical team must critically assess the efficacy of the therapies on a case-by-case basis to ascertain if a procedure will cause more harm than benefit. In addition, the clinical team has a duty to appropriately offer measures that promote comfort.    All patients, such as Mr. Mcmahon, should have their advanced directives addressed. In light of his devastating neurological injury, both [facility] law, CDA and current ethical thinking support the right of two physicians to make medical decisions of DNR/DNI and comfort measures, as patient’s current surrogate defers such decisions to the medical team.     Thank you for this challenging case. Please do not hesitate to call us if there are any questions.   	    DISCUSSED WITH MEDICAL ETHICS ATTENDINGS: EILEEN DELVALLE MD & RADHA EDWARDS DNP (DIRECTOR OF MEDICAL ETHICS)  More than 50% of the time of this consultation was spent in coordination of Care of Patient					      References: 		    1Sjuan david LLANES. The Family Health Care Decisions Act.  [facility] Health Law Journal,2010;15:32-35.  2[place] §§ 2994-d.4, 2994-g.2 (2020).  3[place] § 2997-d (2020). See also, Palliative Care Information Act, [place] § 2997-c (2020).    4[place] § 2994-g.5-a.  5[place] § 2994-g.5-a.

## 2022-03-07 NOTE — PROGRESS NOTE ADULT - SUBJECTIVE AND OBJECTIVE BOX
Subjective/Interval events:  -Discussion with group home member agrees with decision per medical team as no family members patient close with, discussed with palliative and ethics and neuro team and decision for comfort based care and DNR code status and palliative working on inpt hospice placement  -Unable to obtain hx from patient     MEDICATIONS  (STANDING):  dextrose 50% Injectable 25 Gram(s) IV Push once  metoprolol tartrate Injectable 2.5 milliGRAM(s) IV Push every 12 hours    Vital Signs Last 24 Hrs  T(C): 36.5 (07 Mar 2022 14:00), Max: 36.6 (06 Mar 2022 21:30)  T(F): 97.7 (07 Mar 2022 14:00), Max: 97.8 (06 Mar 2022 21:30)  HR: 90 (07 Mar 2022 11:52) (82 - 100)  BP: 104/73 (07 Mar 2022 11:52) (104/73 - 130/82)  BP(mean): 84 (07 Mar 2022 11:52) (83 - 100)  RR: 20 (07 Mar 2022 08:16) (18 - 22)  SpO2: 98% (07 Mar 2022 11:52) (97% - 99%)    PHYSICAL EXAM:  GENERAL: pleasant, NAD  NEURO: Aox0 largely- barely opening eyes, not following commands    HEENT NG tube in place  NECK:  No JVD, no lymphadenopathy  CHEST/LUNG: Clear to auscultation bilaterally anteriorly   HEART: Regular rate and rhythm; No murmurs, rubs, or gallops  ABDOMEN: Soft, Nontender, Nondistended. Normoactive bowel sounds  EXTREMITIES:  No sig le edema, wwp   LINES/DEVICES: Walsh in place clear yellow urine    LABS (reviewed)   -reviewed notable for hypernatremia mild     ASSESSMENT AND PLAN: 71M Nauruan speaking smoker with PMHx of CHF (EF 25-30%), CAD s/p robotic MIDCAB with Dr. Santos, hx of gastric and bladder mass no intervention, transferred from OSH on 2/20/2022 presenting with AMS, MRI revealed acute MCA CVA, b/l acute infarcts largest in R basal ganglia and insular region, poor MS unable to swallow. Poor prognosis, all teams agree comfort based care with DNR/DNI status best for patient, ethics agrees  -Comfort based care- f/u palliative recs on agitation and pain management, remove NG today as per goals discussion today  -CHF hx- limited benefit of bb at this time, can hold     #DVT px- hold as comfort based care  #Dispo- pending inpt hospice     Patient was seen and examined by me at bedside    Greater than 35 minutes spent on total encounter; more than 50% of the visit was spent counseling and/or coordinating care by the attending physician.    Lex Rodriguez MD [phone]

## 2022-03-07 NOTE — PROGRESS NOTE ADULT - SUBJECTIVE AND OBJECTIVE BOX
SUBJECTIVE AND OBJECTIVE:  Patient non verbal.   Agitated.   Remains in restraints and with NGT.  INTERVAL HPI/OVERNIGHT EVENTS:  No overt neurologic improvement    DNR on chart:   Allergies    No Known Allergies    Intolerances    MEDICATIONS  (STANDING):  dextrose 50% Injectable 25 Gram(s) IV Push once  metoprolol tartrate Injectable 2.5 milliGRAM(s) IV Push every 12 hours    MEDICATIONS  (PRN):  glycopyrrolate Injectable 0.2 milliGRAM(s) IV Push every 6 hours PRN secretions  LORazepam   Injectable 0.5 milliGRAM(s) IV Push every 4 hours PRN Agitation  morphine  - Injectable 2 milliGRAM(s) IV Push every 3 hours PRN respiratory rate >22      ITEMS UNCHECKED ARE NOT PRESENT    PRESENT SYMPTOMS: [x ]Unable to obtain due to poor mentation   Source if other than patient:  [ ]Family   [ ]Team     Pain (Impact on QOL): Unable   Location:  Minimal acceptable level (0-10 scale):                   Aggravating factors:  Quality:  Radiation:  Severity (0-10 scale):    Timing:    Dyspnea:                           [ ]Mild [ ]Moderate [ ]Severe  Anxiety:                             [ ]Mild [ ]Moderate [ ]Severe  Fatigue:                             [ ]Mild [ ]Moderate [ ]Severe  Nausea:                             [ ]Mild [ ]Moderate [ ]Severe  Loss of appetite:              [ ]Mild [ ]Moderate [ ]Severe  Constipation:                    [ ]Mild [ ]Moderate [ ]Severe  Grief Present                    [ ] Yes  [ ] No   PAIN AD Score:	  [email or web address].[email or web address] (Ctrl + left click to view)    Other Symptoms:  [ x]All other review of systems negative     Karnofsky Performance Score/Palliative Performance Status Version 2:   20-30      %    http://palliative.info/resource_material/[email or web address]  PHYSICAL EXAM:  Vital Signs Last 24 Hrs  T(C): 36.6 (08 Mar 2022 14:19), Max: 36.6 (08 Mar 2022 14:19)  T(F): 97.9 (08 Mar 2022 14:19), Max: 97.9 (08 Mar 2022 14:19)  HR: 91 (08 Mar 2022 14:19) (91 - 96)  BP: 153/94 (08 Mar 2022 14:19) (141/90 - 153/94)  BP(mean): --  RR: 18 (08 Mar 2022 14:19) (18 - 18)  SpO2: 97% (08 Mar 2022 14:19) (97% - 97%) I&O's Summary    07 Mar 2022 07:01  -  08 Mar 2022 07:00  --------------------------------------------------------  IN: 0 mL / OUT: 925 mL / NET: -925 mL     GENERAL:  [ ]Alert  [ ]Oriented x   [ ]Lethargic  [ ]Cachexia  [x ]Unarousable  [ ]Verbal  [ ]Non-Verbal  Behavioral:   [ ] Anxiety  [ ] Delirium [ ] Agitation [x ] Other  HEENT:  [ ]Normal   [ x]Dry mouth   [ ]ET Tube/Trach  [ ]Oral lesions  PULMONARY:   [ ]Clear [ ]Tachypnea  [ ]Audible excessive secretions   [x ]Rhonchi        [ ]Right [ ]Left [x ]Bilateral  [ ]Crackles        [ ]Right [ ]Left [ ]Bilateral  [ ]Wheezing     [ ]Right [ ]Left [ ]Bilateral  CARDIOVASCULAR:    [x ]Regular [ ]Irregular [ ]Tachy  [ ]Ke [ ]Murmur [ ]Other  GASTROINTESTINAL:  [x ]Soft  [ ]Distended   [x ]+BS  [x ]Non tender [ ]Tender  [ ]PEG [x ]OGT/ NGT   Last BM:  GENITOURINARY:  [ ]Normal [ ] Incontinent   [ ]Oliguria/Anuria   [x ]W[place]h  MUSCULOSKELETAL:   [ ]Normal   [ ]Weakness  [x ]Bed/Wheelchair bound [ ]Edema  NEUROLOGIC:   [ ]No focal deficits  [x ] Cognitive impairment  [ ] Dysphagia [ ]Dysarthria [ ] Paresis [ ]Other   SKIN:   [ ]Normal   [x ]Pressure ulcer(s)  [ ]Rash    CRITICAL CARE:  [ ] Shock Present  [ ]Septic [ ]Cardiogenic [ ]Neurologic [ ]Hypovolemic  [ ]  Vasopressors [ ]  Inotropes   [ ] Respiratory failure present  [ ] Acute  [ ] Chronic [ ] Hypoxic  [ ] Hypercarbic [ ] Other  [ ] Other organ failure     LABS:                        11.7   6.66  )-----------( 194      ( 07 Mar 2022 06:34 )             35.7   03-07    149<H>  |  114<H>  |  23  ----------------------------<  136<H>  3.9   |  24  |  0.59    Ca    8.9      07 Mar 2022 06:34  Mg     2.3     03-07          RADIOLOGY & ADDITIONAL STUDIES:    Protein Calorie Malnutrition Present: [ ] yes [ ] no  [ ] PPSV2 < or = 30%  [ ] significant weight loss [ ] poor nutritional intake [ ] anasarca [ ] catabolic state Artificial Nutrition [ ]     REFERRALS:   [ ]Chaplaincy  [ ] Hospice  [ ]Child Life  [ ]Social Work  [ ]Case management [ ]Holistic Therapy   Goals of Care Document:

## 2022-03-07 NOTE — PROGRESS NOTE ADULT - ATTENDING COMMENTS
The patient is a 71-year-old French/English-speaking male with a history of recent tobacco dependence, severe ICAD, CHF with reduced ejection fraction (20%), prediabetes, CAD status post robotic MIDCAB 2/9/2022, bladder mass concerning for malignancy (not biopsy proved), and gastric mass (?maligancy, pending EGD).  He was admitted with bilateral acute and subacute infarcts of unclear mechanism though occult a fib, athero, and malignancy driving hypercoagulable state among the possibilities.  Unfortunately, the patient developed R MCA syndrome last week and is significantly disabled despite successful thrombectomy.  Overall, given suspected cancer, disabling stroke, and other comorbidities, prognosis is poor. Comfort measures are not be unreasonable. We will discuss next steps with patient's roommate, pall care, and ethics.  For now, continue aspirin, statin.

## 2022-03-07 NOTE — PROGRESS NOTE ADULT - SUBJECTIVE AND OBJECTIVE BOX
Neurology Stroke Progress Note    INTERVAL HPI/OVERNIGHT EVENTS:  Patient seen and examined. Pt moving right arm and leg spontaneously, otherwise not following commands.     MEDICATIONS  (STANDING):  aspirin  chewable 81 milliGRAM(s) Oral daily  atorvastatin 80 milliGRAM(s) Oral at bedtime  dextrose 40% Gel 15 Gram(s) Oral once  dextrose 5%. 1000 milliLiter(s) (100 mL/Hr) IV Continuous <Continuous>  dextrose 5%. 1000 milliLiter(s) (50 mL/Hr) IV Continuous <Continuous>  dextrose 50% Injectable 25 Gram(s) IV Push once  dextrose 50% Injectable 12.5 Gram(s) IV Push once  dextrose 50% Injectable 25 Gram(s) IV Push once  enoxaparin Injectable 30 milliGRAM(s) SubCutaneous every 24 hours  glucagon  Injectable 1 milliGRAM(s) IntraMuscular once  insulin regular  human corrective regimen sliding scale   SubCutaneous every 6 hours  metoprolol tartrate 12.5 milliGRAM(s) Oral two times a day  pantoprazole   Suspension 40 milliGRAM(s) Oral daily  senna 2 Tablet(s) Oral at bedtime    MEDICATIONS  (PRN):  acetaminophen    Suspension .. 650 milliGRAM(s) Oral every 6 hours PRN Temp greater or equal to 38C (100.4F), Mild Pain (1 - 3)  polyethylene glycol 3350 17 Gram(s) Oral daily PRN Constipation      Allergies    No Known Allergies    Intolerances        Vital Signs Last 24 Hrs  T(C): 36.2 (07 Mar 2022 06:11), Max: 36.7 (06 Mar 2022 13:25)  T(F): 97.1 (07 Mar 2022 06:11), Max: 98.1 (06 Mar 2022 13:25)  HR: 96 (07 Mar 2022 08:16) (84 - 100)  BP: 108/75 (07 Mar 2022 08:16) (108/75 - 162/89)  BP(mean): 87 (07 Mar 2022 08:16) (87 - 119)  RR: 20 (07 Mar 2022 08:16) (18 - 22)  SpO2: 97% (07 Mar 2022 08:16) (97% - 99%)    Physical exam:  General: No acute distress, awake and alert  Eyes: Anicteric sclerae, moist conjunctivae, see below for CNs  Neck: trachea midline,  Cardiovascular: Regular rate and rhythm, no murmurs  Pulmonary: Anterior breath sounds clear bilaterally No use of accessory muscles  GI: Abdomen soft, non-distended, non-tender  Extremities: no edema    Neurologic:  -Mental status: Stuporous, opened eyes slightly to noxious stimuli, otherwise did not follow commands.  -Cranial nerves:   II: Visual fields are full to confrontation.  III, IV, VI: Extraocular movements are intact without nystagmus. Pupils equally round and reactive to light  VII: Left facial droop  Motor: Decreased bulk. Right arm and leg moving spontaneously at least 3/5, 5/5  strength, does not follow command to hold up for 10 seconds. Left arm 0/5, left leg 0/5  Sensation: No reaction to noxious stimuli of left arm    LABS:                        11.7   6.66  )-----------( 194      ( 07 Mar 2022 06:34 )             35.7     03-07    149<H>  |  114<H>  |  23  ----------------------------<  136<H>  3.9   |  24  |  0.59    Ca    8.9      07 Mar 2022 06:34  Phos  3.4     03-06  Mg     2.3     03-07            RADIOLOGY & ADDITIONAL TESTS:     Neurology Stroke Progress Note    INTERVAL HPI/OVERNIGHT EVENTS:  Patient seen and examined. Pt moving right arm and leg spontaneously, otherwise not following commands.     MEDICATIONS  (STANDING):  aspirin  chewable 81 milliGRAM(s) Oral daily  atorvastatin 80 milliGRAM(s) Oral at bedtime  dextrose 40% Gel 15 Gram(s) Oral once  dextrose 5%. 1000 milliLiter(s) (100 mL/Hr) IV Continuous <Continuous>  dextrose 5%. 1000 milliLiter(s) (50 mL/Hr) IV Continuous <Continuous>  dextrose 50% Injectable 25 Gram(s) IV Push once  dextrose 50% Injectable 12.5 Gram(s) IV Push once  dextrose 50% Injectable 25 Gram(s) IV Push once  enoxaparin Injectable 30 milliGRAM(s) SubCutaneous every 24 hours  glucagon  Injectable 1 milliGRAM(s) IntraMuscular once  insulin regular  human corrective regimen sliding scale   SubCutaneous every 6 hours  metoprolol tartrate 12.5 milliGRAM(s) Oral two times a day  pantoprazole   Suspension 40 milliGRAM(s) Oral daily  senna 2 Tablet(s) Oral at bedtime    MEDICATIONS  (PRN):  acetaminophen    Suspension .. 650 milliGRAM(s) Oral every 6 hours PRN Temp greater or equal to 38C (100.4F), Mild Pain (1 - 3)  polyethylene glycol 3350 17 Gram(s) Oral daily PRN Constipation      Allergies    No Known Allergies    Intolerances        Vital Signs Last 24 Hrs  T(C): 36.2 (07 Mar 2022 06:11), Max: 36.7 (06 Mar 2022 13:25)  T(F): 97.1 (07 Mar 2022 06:11), Max: 98.1 (06 Mar 2022 13:25)  HR: 96 (07 Mar 2022 08:16) (84 - 100)  BP: 108/75 (07 Mar 2022 08:16) (108/75 - 162/89)  BP(mean): 87 (07 Mar 2022 08:16) (87 - 119)  RR: 20 (07 Mar 2022 08:16) (18 - 22)  SpO2: 97% (07 Mar 2022 08:16) (97% - 99%)    Physical exam:  General: No acute distress, awake and alert  Eyes: Anicteric sclerae, moist conjunctivae, see below for CNs  Neck: trachea midline,  Cardiovascular: Regular rate and rhythm, no murmurs  Pulmonary: Anterior breath sounds clear bilaterally No use of accessory muscles  GI: Abdomen soft, non-distended, non-tender  Extremities: no edema    Neurologic:  -Mental status: Stuporous, opened eyes slightly to noxious stimuli, otherwise did not follow commands.  -Cranial nerves:   II: Visual fields are full to confrontation.  III, IV, VI: Extraocular movements are intact without nystagmus. Pupils equally round and reactive to light  VII: Left facial droop  Motor: Decreased bulk. Right arm and leg moving spontaneously at least 3/5, 5/5  strength, does not follow command to hold up for 10 seconds. Left arm 0/5, left leg 0/5  Sensation: No reaction to noxious stimuli of left arm    LABS:                        11.7   6.66  )-----------( 194      ( 07 Mar 2022 06:34 )             35.7     03-07    149<H>  |  114<H>  |  23  ----------------------------<  136<H>  3.9   |  24  |  0.59    Ca    8.9      07 Mar 2022 06:34  Phos  3.4     03-06  Mg     2.3     03-07            RADIOLOGY & ADDITIONAL TESTS:    < from: CT Head No Cont (03.05.22 @ 14:14) >  IMPRESSION: Interval improvement in contrast staining status post   mechanical thrombectomy. The focal areas of evolving subacute ischemia   along the right MCA territory.    < end of copied text >

## 2022-03-07 NOTE — PROGRESS NOTE ADULT - PROBLEM SELECTOR PLAN 2
- Patient unable to participate in speech and swallow evaluation due to extremely poor mental status.  - PEG tube would not be recommended given irreversible comorbidities, such as advanced heart failure, catastrophic strokes and likely malignancy.  - Aspiration precautions, mouth care.
- Patient unable to participate in speech and swallow evaluation due to extremely poor mental status.  - PEG tube would not be recommended given irreversible comorbidities, such as advanced heart failure, catastrophic strokes and likely malignancy.  - Aspiration precautions, mouth care.  - NGT removed.

## 2022-03-07 NOTE — CONSULT NOTE ADULT - CONSULT REQUESTED DATE/TIME
07-Mar-2022 12:45
28-Feb-2022 12:00
01-Mar-2022 06:15
24-Feb-2022 16:03
28-Feb-2022 13:15
24-Feb-2022 14:13
24-Feb-2022 19:43
25-Feb-2022 09:00
25-Feb-2022 13:57

## 2022-03-07 NOTE — CHART NOTE - NSCHARTNOTEFT_GEN_A_CORE
Case discussed via multidisciplinary approach, with primary team, Ethics, Medical Co-Management team and Palliative Care Team. Given numerous active comorbidities, including devastating strokes, advanced heart failure, dysphagia, encephalopathy, and possible abdominal malignancy, decision made to pursue comfort based approach, as culmination of active medical issues is irreversible. Decision made to remove NGT, proceed with 2 physician DNR and initiate medication in line with a comfort based approach. [place] referral made for ongoing end of life care. Patients roommate, Pascual Park, who is acting as a surrogate, with the absence of any known family, is also in agreement. Restraints, NGT to be removed and non comfort based medications to be discontinued.

## 2022-03-07 NOTE — CONSULT NOTE ADULT - SUBJECTIVE AND OBJECTIVE BOX
Consult requested by: Siva Burt MD	   	Role: Chief of Palliative Medicine		    Service: Palliative  Attending: Nazia Joiner MD  Consultant: Cindy Lawrence MS, [facility] (Medical Ethicist)                             Contact #s: [phone] (c)  [phone] (o)  Consult purpose:  To assist the healthcare team in an ethical dilemma of a 71-year-old male admitted for further management and workup of cerebral vascular accident, currently with poor prognosis, regarding goals of care.  									  Clinical summary (SEE INITIAL CONSULT FROM 2/28/22): This is a 71-year-old Trinidadian speaking male with a past medical history of smoking (very recently quit, smoked x40 years), chronic heart failure (ejection fraction 25-30%) and prediabetes (A1c 5.9). Patient underwent robotic minimally invasive direct CAB on 2/9/2022, was ambulating well, and discharged on post op day 4. Patient returned to outside hospital on 2/20/2022 with altered mental status. MRI showed bilateral embolic appearing strokes with the largest in the right basal ganglia and insural region. Patient underwent ECHO, EEG and cerebral angiogram at outside hospital. EEG negative for seizure. 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 Patient transferred to [facility] on 2/24 for further management and workup. Patient initially admitted to CT surgery but transferred to stroke service given CVA as primary reason for admission. Currently, patient pending TTE and JOEY. Patient’s course has been complicated by hypernatremia and known bladder and gastric masses, pending EGD and/or EUS. Gastroenterology (GI) and Urology () consulted on 2/24 given stomach antrum ulceration and bladder mass on CT scan. Per GI note on 2/24, patient will need neurologic evaluation/optimization and recommendations prior to any endoscopic procedure. Per  note on 2/24, patient recommended for urine cytology, MRI renal mass protocol or CT renal mass protocol to characterize lesion. On 2/24, stroke team consulted for recent stroke findings. Patient failed a dysphagia screen, but patient evaluated by Speech Language Pathology (SLP) and was recommended for soft, bite sized diet with mild thick liquids. On 2/25, Cardiology consulted for heart failure management. On 2/26, patient started on D5w for hypernatremia due to suspected insufficient PO intake at 70mL/hr. CT head scan performed on 2/26 showing age-indeterminate infarcts in the right frontal operculum, right corona radiata, right basal ganglia and right insular cortex. Overnight on 2/26, patient agitated and appeared to be in pain. On 2/27, patient with elevated sodium level (153). On 2/28, NGT placed and EGD/EUS ordered. Per Hospitalist note on 2/28, patient able to state name and nod/shake head, move all extremities on command. However, patient has decreased fluency and has not been able to state yes/no consistently or answer open-ended questions. As of 2/28, patient remains significantly hypernatremic with a significant free water deficit. Physical Therapy evaluated patient on 2/28 and patient being recommended for acute rehab. NG tube placed on 2/28 and patient pulled it out overnight. On 3/3, patient found with right gaze deviation and left side flaccidy, and subsequently found to have developed right middle cerebral artery syndrome. CT angiogram showed distal M1 with CTP showing significant penumbra and no core infarct. Patient was taken for successful thrombectomy and transferred to ICU for further monitoring. On 3/4, patient stepped down to Telemetry. Per Neurology note on 3/4, patient has had little clinical improvement. Per note, given suspected cancer, disabling stroke, and other comorbidities, prognosis is poor and comfort measures would not be unreasonable. On 3/7, patient moving right arm and leg spontaneously, but otherwise not following commands. Per SW note on 3/8, Team no longer feels patient is appropriate for acute rehab given deterioration. Palliative on consult. Per Palliative note on 3/7, given numerous active comorbidities, including devastating strokes, advanced heart failure, dysphagia, encephalopathy, and possible abdominal malignancy, culmination of active medical issues is irreversible. Continuation of consult to further clarify goals of care for a patient with poor prognosis and a surrogate deferring decision making.      Prognosis Estimate (survival in days, wks, mos, yrs):	 Poor				  Patient Decision-Making Capacity:    Has capacity 	  Lacks capacity (patient with new devastating stroke on 3/3)  Patient Aware of:  Diagnosis:   Yes    No   Unknown    Prognosis:   Yes    No   Unknown       Name of medical decision-maker should patient lack capacity: Soon Park	   Relationship: Friend  Role:   Health Care Proxy      Legal Surrogate   Contact #(s): [phone]  Other Stake-Holders:  NONE  Evidence of Patient’s Preference of Life-Sustaining Treatment (Written or Oral): None  Resuscitation status:  DNR:  Yes   No      DNI:  Yes   No	(AS A RESULT OF THIS CONSULT)    Discussions:   Discussion with Mars Burt MD (Palliative) on 3/7/22 (12:45 – 12:55): Discussed patient’s case and clinical condition. Patient had another stroke on 3/3 and now patient is more debilitated—he can no longer swallow and does not follow commands. Given patient’s current condition and comorbidities of a bladder mass (likely cancer) and heart failure with a low ejection fraction, Stroke attending believes patient’s prognosis is poor. Palliative spoke with patient’s roommate/friend, Pascual Sands, who is onboard with comfort care approach but officially defers decision making for comfort care, DNR/DNI to medical team. As surrogate is deferring decision making (and there is no alternative surrogate) and patient meets [facility] criteria for withdrawing and withholding life sustaining treatments (below), 2 physicians consent can proceed for DNR/DNI and comfort care for patient.    Bioethics analysis:  The central ethical issue that exists in this case is (A) respecting the patient’s autonomy versus (B) the providers’ desire for beneficence and non-maleficence. The conflict that exists in this situation is one hospital clinicians infrequently encounter in terminally ill patients who have a poor prognosis for recovery but whose surrogates defer decision making regarding aggressive life-sustaining treatments (LSTs). At one pole is the bioethical principle of autonomy – here needing to be determined. In tension at the other pole is the physician’s duty of non-maleficence – with interventions that appear to neither have little long-term benefit nor restore a functional quality of life for a specific patient and may increase the patient’s suffering or prolong the dying process.     Mr. Mcmahon has an innate MORAL STATUS – that is, his distinct personhood, personal experience and interpretation of suffering, life-story, etc. – which must be respected.       In the case where a patient is without capacity, we refer back to the 2010 [facility] Family Health Care Decisions Act (FHDCA).1 A decision to withdraw or withhold life-sustaining treatment can only be made after determination that the treatment offers the patient no medical benefit because the patient will die imminently, even if the treatment is provided, and the provision of the treatment would violate accepted medical standards. In this case, the provision of escalating treatments that would be reasonably deemed inhumane or cause extraordinary burden violates acceptable standards of medical care. [facility] Law imposes some constraints on patients’ and clinicians’ decisions about foregoing life sustaining treatments (LSTs). Under the [facility], a decision to withhold LSTs must meet one of two criteria:  • Criterion 1: Treatment would be an extraordinary burden to the patient        			AND: (Select a or b)  a. The patient has an illness or injury which can be expected to cause death within six months,   whether or not treatment is provided; OR         		b.    The patient is permanently unconscious.  • Criterion 2:  The patient has an irreversible or incurable condition; AND the provision of treatment would involve such pain, suffering or other burden that it would reasonably be deemed inhumane or extraordinarily burdensome under the circumstances.    Such as in this case, surrogates may be unburdened from making detailed medical decisions about treatments that offer no medical benefit through the use of deferring decision making to the clinical team, as long as no alternative surrogate is available.     For decisions regarding withholding and withdrawing life-sustaining treatment or ordering a MOLST, the practitioner will make this determination taking into account the patient’s wishes, or if they are not known, in the patient’s best interest.2  This decision must be made by the attending practitioner with the independent concurrence of another physician, nurse practitioner or physician assistant.3    In this case, the patient has multiple active comorbidities and has experienced devastating strokes, and no interventions will restore him to a functional baseline. Palliation and comfort care measures for this patient are an appropriate next step in the plan of care. In accordance with the Palliative Care Access Act, the attending practitioner is required to provide patients with a terminal illness not only prognosis, risks and benefits of treatment options but also patient’s legal rights to symptom management at end of life.4 Fundamental to a palliative approach is the aim to reduce suffering and improve the quality of life for dying patients.

## 2022-03-07 NOTE — PROGRESS NOTE ADULT - PROBLEM SELECTOR PLAN 5
- 60 minutes spent with patients surrogate Soon Park.  - Irreversible nature of underlying comorbidities discussed.   - Code status discussed. In agreement with 2 physicians making determination for DNR.  - MOLST filled out.  - Further GOC discussions to occur on Monday, with Ethics involvement.
- 60 minutes spent with patients surrogate Soon Park.  - Irreversible nature of underlying comorbidities discussed.   - Code status discussed. In agreement with 2 physicians making determination for DNR.  - MOLST filled out.  - Decision made for full comfort care.  - NGT to be removed.   - Unnecessary medication to be discontinued.  - [place] referral made.

## 2022-03-08 ENCOUNTER — TRANSCRIPTION ENCOUNTER (OUTPATIENT)
Age: 72
End: 2022-03-08

## 2022-03-08 VITALS
RESPIRATION RATE: 18 BRPM | OXYGEN SATURATION: 97 % | HEART RATE: 91 BPM | DIASTOLIC BLOOD PRESSURE: 94 MMHG | TEMPERATURE: 98 F | SYSTOLIC BLOOD PRESSURE: 153 MMHG

## 2022-03-08 DIAGNOSIS — Z71.89 OTHER SPECIFIED COUNSELING: ICD-10-CM

## 2022-03-08 LAB — SARS-COV-2 RNA SPEC QL NAA+PROBE: SIGNIFICANT CHANGE UP

## 2022-03-08 PROCEDURE — 70450 CT HEAD/BRAIN W/O DYE: CPT

## 2022-03-08 PROCEDURE — 85027 COMPLETE CBC AUTOMATED: CPT

## 2022-03-08 PROCEDURE — 93970 EXTREMITY STUDY: CPT

## 2022-03-08 PROCEDURE — 97163 PT EVAL HIGH COMPLEX 45 MIN: CPT

## 2022-03-08 PROCEDURE — 85610 PROTHROMBIN TIME: CPT

## 2022-03-08 PROCEDURE — 99233 SBSQ HOSP IP/OBS HIGH 50: CPT

## 2022-03-08 PROCEDURE — 0042T: CPT

## 2022-03-08 PROCEDURE — 80048 BASIC METABOLIC PNL TOTAL CA: CPT

## 2022-03-08 PROCEDURE — 85025 COMPLETE CBC W/AUTO DIFF WBC: CPT

## 2022-03-08 PROCEDURE — 81001 URINALYSIS AUTO W/SCOPE: CPT

## 2022-03-08 PROCEDURE — 36415 COLL VENOUS BLD VENIPUNCTURE: CPT

## 2022-03-08 PROCEDURE — 85576 BLOOD PLATELET AGGREGATION: CPT

## 2022-03-08 PROCEDURE — C1769: CPT

## 2022-03-08 PROCEDURE — 86880 COOMBS TEST DIRECT: CPT

## 2022-03-08 PROCEDURE — 87635 SARS-COV-2 COVID-19 AMP PRB: CPT

## 2022-03-08 PROCEDURE — 97162 PT EVAL MOD COMPLEX 30 MIN: CPT

## 2022-03-08 PROCEDURE — 93005 ELECTROCARDIOGRAM TRACING: CPT

## 2022-03-08 PROCEDURE — 86870 RBC ANTIBODY IDENTIFICATION: CPT

## 2022-03-08 PROCEDURE — 84436 ASSAY OF TOTAL THYROXINE: CPT

## 2022-03-08 PROCEDURE — 84480 ASSAY TRIIODOTHYRONINE (T3): CPT

## 2022-03-08 PROCEDURE — C1889: CPT

## 2022-03-08 PROCEDURE — 70498 CT ANGIOGRAPHY NECK: CPT

## 2022-03-08 PROCEDURE — 84100 ASSAY OF PHOSPHORUS: CPT

## 2022-03-08 PROCEDURE — C1757: CPT

## 2022-03-08 PROCEDURE — 83735 ASSAY OF MAGNESIUM: CPT

## 2022-03-08 PROCEDURE — 92526 ORAL FUNCTION THERAPY: CPT

## 2022-03-08 PROCEDURE — C1894: CPT

## 2022-03-08 PROCEDURE — U0003: CPT

## 2022-03-08 PROCEDURE — 86900 BLOOD TYPING SEROLOGIC ABO: CPT

## 2022-03-08 PROCEDURE — C1760: CPT

## 2022-03-08 PROCEDURE — 97530 THERAPEUTIC ACTIVITIES: CPT

## 2022-03-08 PROCEDURE — 99233 SBSQ HOSP IP/OBS HIGH 50: CPT | Mod: GC

## 2022-03-08 PROCEDURE — 97116 GAIT TRAINING THERAPY: CPT

## 2022-03-08 PROCEDURE — 76770 US EXAM ABDO BACK WALL COMP: CPT

## 2022-03-08 PROCEDURE — 80053 COMPREHEN METABOLIC PANEL: CPT

## 2022-03-08 PROCEDURE — C8929: CPT

## 2022-03-08 PROCEDURE — 86901 BLOOD TYPING SEROLOGIC RH(D): CPT

## 2022-03-08 PROCEDURE — 93312 ECHO TRANSESOPHAGEAL: CPT

## 2022-03-08 PROCEDURE — 85730 THROMBOPLASTIN TIME PARTIAL: CPT

## 2022-03-08 PROCEDURE — 70496 CT ANGIOGRAPHY HEAD: CPT

## 2022-03-08 PROCEDURE — C1887: CPT

## 2022-03-08 PROCEDURE — 71045 X-RAY EXAM CHEST 1 VIEW: CPT

## 2022-03-08 PROCEDURE — 84439 ASSAY OF FREE THYROXINE: CPT

## 2022-03-08 PROCEDURE — 86905 BLOOD TYPING RBC ANTIGENS: CPT

## 2022-03-08 PROCEDURE — 97164 PT RE-EVAL EST PLAN CARE: CPT

## 2022-03-08 PROCEDURE — 92610 EVALUATE SWALLOWING FUNCTION: CPT

## 2022-03-08 PROCEDURE — 82962 GLUCOSE BLOOD TEST: CPT

## 2022-03-08 PROCEDURE — U0005: CPT

## 2022-03-08 PROCEDURE — 86850 RBC ANTIBODY SCREEN: CPT

## 2022-03-08 PROCEDURE — 88112 CYTOPATH CELL ENHANCE TECH: CPT

## 2022-03-08 RX ORDER — ROBINUL 0.2 MG/ML
0.2 INJECTION INTRAMUSCULAR; INTRAVENOUS
Qty: 0 | Refills: 0 | DISCHARGE
Start: 2022-03-08

## 2022-03-08 RX ORDER — METOPROLOL TARTRATE 50 MG
2.5 TABLET ORAL
Qty: 0 | Refills: 0 | DISCHARGE
Start: 2022-03-08

## 2022-03-08 RX ORDER — MORPHINE SULFATE 50 MG/1
2 CAPSULE, EXTENDED RELEASE ORAL
Qty: 0 | Refills: 0 | DISCHARGE
Start: 2022-03-08

## 2022-03-08 RX ADMIN — Medication 2.5 MILLIGRAM(S): at 05:07

## 2022-03-08 NOTE — PROGRESS NOTE ADULT - SUBJECTIVE AND OBJECTIVE BOX
Subjective/Interval events:  -Appears comfortable, not participating in exam    MEDICATIONS  (STANDING):  dextrose 50% Injectable 25 Gram(s) IV Push once  metoprolol tartrate Injectable 2.5 milliGRAM(s) IV Push every 12 hours    MEDICATIONS  (PRN):  glycopyrrolate Injectable 0.2 milliGRAM(s) IV Push every 6 hours PRN secretions  LORazepam   Injectable 0.5 milliGRAM(s) IV Push every 4 hours PRN Agitation  morphine  - Injectable 2 milliGRAM(s) IV Push every 3 hours PRN respiratory rate >22      Vital Signs Last 24 Hrs  T(C): 36.4 (08 Mar 2022 04:52), Max: 36.6 (07 Mar 2022 20:43)  T(F): 97.6 (08 Mar 2022 04:52), Max: 97.8 (07 Mar 2022 20:43)  HR: 96 (08 Mar 2022 04:52) (72 - 96)  BP: 141/90 (08 Mar 2022 04:52) (120/76 - 141/90)  BP(mean): --  RR: 18 (08 Mar 2022 04:52) (17 - 18)  SpO2: 97% (08 Mar 2022 04:52) (95% - 100%)    PHYSICAL EXAM:  GENERAL: pleasant, NAD  NEURO: Aox0-1 opens his eyes today, not following commands   HEENT: clear op, mmm  NECK:  No JVD, no lymphadenopathy  CHEST/LUNG: Clear to auscultation bilaterally; No rales, rhonchi, wheezing. Normal work of breathing, not tachypneic  HEART: Regular rate and rhythm; No murmurs, rubs, or gallops  ABDOMEN: Soft, Nontender, Nondistended. Normoactive bowel sounds  EXTREMITIES:  No sig le edema, wwp     ASSESSMENT AND PLAN: 71M Thai speaking smoker with PMHx of CHF (EF 25-30%), CAD s/p robotic MIDCAB with Dr. Santos, hx of gastric and bladder mass no intervention, transferred from OSH on 2/20/2022 presenting with AMS, MRI revealed acute MCA CVA, b/l acute infarcts largest in R basal ganglia and insular region, poor MS unable to swallow. Poor prognosis, all teams agree comfort based care with DNR/DNI status best for patient, ethics agrees  -Appreciate palliative care recs for comfort based care   -C/w IV metop for symptom control as sig tachycardia hx this visit, on it otherwise for CHF    #DVT px- hold as comfort based care  #Dispo- to inpt hospice today/tomorrow    Patient was seen and examined by me at bedside    Greater than 35 minutes spent on total encounter; more than 50% of the visit was spent counseling and/or coordinating care by the attending physician.    Lex Rodriguez MD [phone]

## 2022-03-08 NOTE — DISCHARGE NOTE PROVIDER - INSTRUCTIONS
NPO as unable to tolerate PO intake  NG tube removed on 03/07.   Plan to pursue comfort feeds as patient is able to tolerate.

## 2022-03-08 NOTE — DISCHARGE NOTE NURSING/CASE MANAGEMENT/SOCIAL WORK - PATIENT PORTAL LINK FT
You can access the FollowMyHealth Patient Portal offered by [facility] by registering at the following website: [email or web address]. By joining [employer]’s FollowMyHealth portal, you will also be able to view your health information using other applications (apps) compatible with our system.

## 2022-03-08 NOTE — PROGRESS NOTE ADULT - ASSESSMENT
per Neurology    71 y o M Armenian speaking smoker (very recently quit, smoked x 40 years) with PMHx of CHF (EF 25-30%), prediabetes, s/p robotic MIDCAB with Dr. Santos on 02/09/2022 transferred from OSH on 2/20/2022 presenting with AMS, MRI revealed acute MCA CVA, b/l acute infarcts largest in R basal ganglia and insular region. Transferred to [facility] for further workup. Course has been complicated by hypernatremia, known bladder and gastric masses (no inpatient intervention by  and GI. Further complicated by acute right MCA stroke s/p R M1 thrombectomy 3/3/22, with further deterioration of mental status. Currently with NG tube, palliative on board, also ethics since he has no health care proxy.     NEURO  #CVA  - neuro checks/vital signs q4hr  - post thrombectomy CTH 3/3 shows some contrast staining R >L, no hemorrhage noted   - Continue ASA 81mg PO daily   - Continue Atorvastatin 80mg PO daily  - TTE w bubble - EF 20%, no shunt  - NCHCT most recently 3/5 with improvement of contrast staining, evolving right MCA stroke, also stable bilateral strokes compared to other CTs  - c/w  normotension  - F/u with palliative and ethics regarding GOC and decision making by roommate - roommate does not want to continue making medical decisions. 2PC consent for DNR status  - A1C: 5.4%  - LDL: 90    CARDIO  - -150  - echo 2/24: EF 20%  - continue 12.5mg lopressor BID per NGT, can transition to toprol 25mg daily PRN per cardiology   - can start entresto PRN per cardiology     GI/:  #GI mass  - NGT inserted, on tube feeds  - GI rec follow outpatient for stomach mass  -  rec follow outpatient for bladder mass  - evans; keep during hospitalization per urology, will need outpatient follow-up for bladder mass   - bowel regimen   - cont home med protonix    RENAL  #Hypernatremia  - Na 149 this morning while on tube feeds (FWD 1.3L), started FWF 300cc q8H  - daily bmp    DVT prophylaxis   - Lovenox SQ in addition to SCDs

## 2022-03-08 NOTE — DISCHARGE NOTE PROVIDER - HOSPITAL COURSE
Hospital course:  71y male, smoker x 40 years (recently quit) with pmhx of CHF (EF 25-30%), prediabetes, s/p robotic MIDCAB with Dr. Santos on 02/09/2022, bladder mass concerning for malignancy (not biopsy proved), and gastric mass presented to OSH for AMS. Workup with brain imaging revealed b/l scattered infarcts with the largest in the basal ganglia and insular region. Vessel imaging with focal right M2 stenosis secondary to high grade near total occlusion vs focal occlusion. At OSH, EEG was negative for seizure, with TTE reportedly with evidence of PFO (which was not seen at [facility] on his prior admission). Cerebral angiogram with moderate stenosis of b/l ICA and mild stenosis of b/l ICA at origin, moderate to severe stenosis of left subclavian.  He was transferred to [facility] for further management. On admission to [facility], NIHSS 4. Patient was started on aspirin only in anticipation of EGD/EUS for  w/u of gastric mass. Urine cytology sent for bladder mass w/u per . Course complicated by hypernatremia, new RMCA infarct 3/3/22 manifesting as R gaze deviation and left sided weakness, NIHSS 22. Imaging significant for distal M1 occlusion, CTP with penumbra with no core infarct. S/p R M1 thrombectomy TICI 1 to TICI 3 with some contrast extravasation in RMCA territory and L caudate territory. Despite successful thrombectomy, patient with significant disabling deficits. Overall, given suspected cancer, disabling stroke, and other comorbidities, prognosis is poor. In conjunction with palliative care, ethics, medicine and roommate [facility] who is acting as a surrogate in absence of known family, decision made to pursue comfort care with 2 physician consent. Medication in line with comfort based approach initiated. Patient discharged to [place] for end of life care.         During this hospital course, patient had multiple ischemic strokes in b/l hemispheres on CTH and MRI. Etiology likely due to occult afib, ICAD, hypercoagable state secondary to underlying malignancy.     Patient had the following workup done in house:  CTH 2/26: Age-indeterminate infarcts in the right frontal operculum, right corona   radiata, right basal ganglia, and right insular cortex   CTH 3/3: Evolving sites of subacute infarct in the right MCA territory. No   definite new infarct since 2/26/22, and no sign of hemorrhagic   transformation.  CTP 3/3: Positive CT perfusion. There is elevated transit time with mismatch in   cerebral blood flow at the right MCA territory.  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 Multifocal severe   and moderate narrowing of the left proximal subclavian artery. Mild   narrowing at the origin of the cervical ICAs. Tapering to complete occlusion of the right distal M1 segment   at the right MCA bifurcation. Mild to moderate narrowing of the right   distal petrous and cavernous ICA. Moderate narrowing of the left   cavernous and supraclinoid ICA.  CT Head 3/5: improvement of contrast staining, evolving right MCA stroke, also stable bilateral strokes compared to other CTs  TTE 2/24:  1. Aortic sclerosis without significant stenosis.   2. Mild aortic regurgitation.   3.Mild pulmonic regurgitation.   4. No other significant valvular disease.   5. Normal right ventricular size and systolic function.   6. The left ventricle cavity size is normal. There is mild concentric   left ventricular hypertrophy. Abnormal septalmotion seen due to abnormal   conduction. Left ventricular systolic function is severely reduced with a   calculated ejection fraction of 20% with regional wall motion   abnormalities.   7. Trivial pericardial effusion without echocardiographic evidence of   cardiac tamponade physiology.   8. Bilateral pleural effusion.   9. Compared to the previous TTE performed on 2/9/2022, pericardial   effusion appears to have decreased however image quality was better on   prior study.  JOEY 3/4:  1. Limited study obtained for evaluation of PFO.   2. The interatrial septum appears intact. Injection of agitated saline   via a peripheral vein reveals no evidence of a right-to-left shunt.  labs: A1c 5.4, LDL 90    Physical exam at discharge:  Neurologic:  -Mental status: Stuporous, opened eyes slightly to noxious stimuli, otherwise did not follow commands.  -Cranial nerves:   II: Visual fields are full to confrontation.  III, IV, VI: Extraocular movements are intact without nystagmus. Pupils equally round and reactive to light  VII: Left facial droop  Motor: Decreased bulk. Right arm and leg moving spontaneously at least 3/5, 5/5  strength, does not follow command to hold up for 10 seconds. Left arm 0/5, left leg 0/5  Sensation: No reaction to noxious stimuli of left arm         Hospital course:  71y male, smoker x 40 years (recently quit) with pmhx of CHF (EF 25-30%), prediabetes, s/p robotic MIDCAB with Dr. Santos on 02/09/2022, bladder mass concerning for malignancy (not biopsy proved), and gastric mass presented to OSH for AMS. Workup with brain imaging revealed b/l scattered infarcts with the largest in the basal ganglia and insular region. Vessel imaging with focal right M2 stenosis secondary to high grade near total occlusion vs focal occlusion. At OSH, EEG was negative for seizure, with TTE reportedly with evidence of PFO (which was not seen at [facility] on his prior admission). Cerebral angiogram with moderate stenosis of b/l ICA and mild stenosis of b/l ICA at origin, moderate to severe stenosis of left subclavian.  He was transferred to [facility] for further management. On admission to [facility], NIHSS 4. Patient was started on aspirin only in anticipation of EGD/EUS for  w/u of gastric mass. Urine cytology sent for bladder mass w/u per . Course complicated by hypernatremia, new RMCA infarct 3/3/22 manifesting as R gaze deviation and left sided weakness, NIHSS 22. Imaging significant for distal M1 occlusion, CTP with penumbra with no core infarct. S/p R M1 thrombectomy TICI 1 to TICI 3 with some contrast extravasation in RMCA territory and L caudate territory. Despite successful thrombectomy, patient with significant disabling deficits. Overall, given suspected cancer, disabling stroke, and other comorbidities, prognosis is poor. In conjunction with palliative care, ethics, medicine and roommate [facility] who is acting as a surrogate in absence of known family, decision made to pursue comfort care with 2 physician consent. Medication in line with comfort based approach initiated. Patient discharged to [place] for end of life care.         During this hospital course, patient had multiple ischemic strokes in b/l hemispheres on CTH and MRI. Etiology likely due to occult afib, ICAD, hypercoagable state secondary to underlying malignancy.     Patient had the following workup done in house:  CTH 2/26: Age-indeterminate infarcts in the right frontal operculum, right corona   radiata, right basal ganglia, and right insular cortex   CTH 3/3: Evolving sites of subacute infarct in the right MCA territory. No   definite new infarct since 2/26/22, and no sign of hemorrhagic   transformation.  CTP 3/3: Positive CT perfusion. There is elevated transit time with mismatch in   cerebral blood flow at the right MCA territory.  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 Multifocal severe   and moderate narrowing of the left proximal subclavian artery. Mild   narrowing at the origin of the cervical ICAs. Tapering to complete occlusion of the right distal M1 segment   at the right MCA bifurcation. Mild to moderate narrowing of the right   distal petrous and cavernous ICA. Moderate narrowing of the left   cavernous and supraclinoid ICA.  CT Head 3/5: improvement of contrast staining, evolving right MCA stroke, also stable bilateral strokes compared to other CTs  TTE 2/24:  1. Aortic sclerosis without significant stenosis.   2. Mild aortic regurgitation.   3.Mild pulmonic regurgitation.   4. No other significant valvular disease.   5. Normal right ventricular size and systolic function.   6. The left ventricle cavity size is normal. There is mild concentric   left ventricular hypertrophy. Abnormal septalmotion seen due to abnormal   conduction. Left ventricular systolic function is severely reduced with a   calculated ejection fraction of 20% with regional wall motion   abnormalities.   7. Trivial pericardial effusion without echocardiographic evidence of   cardiac tamponade physiology.   8. Bilateral pleural effusion.   9. Compared to the previous TTE performed on 2/9/2022, pericardial   effusion appears to have decreased however image quality was better on   prior study.  JOEY 3/4:  1. Limited study obtained for evaluation of PFO.   2. The interatrial septum appears intact. Injection of agitated saline   via a peripheral vein reveals no evidence of a right-to-left shunt.  labs: A1c 5.4, LDL 90    Physical exam at discharge:  Neurologic:  -Mental status: Stuporous, opened eyes slightly to noxious stimuli, otherwise did not follow commands.  -Cranial nerves:   II: Visual fields are full to confrontation.  III, IV, VI: Extraocular movements are intact without nystagmus. Pupils equally round and reactive to light  VII: Left facial droop  Motor: Decreased bulk. Right arm and leg moving spontaneously at least 3/5, 5/5  strength, does not follow command to hold up for 10 seconds. Left arm 0/5, left leg 0/5  Sensation: No reaction to noxious stimuli of left arm    Discharge NIHSS: 22       Hospital course:  71y male, smoker x 40 years (recently quit) with pmhx of CHF (EF 25-30%), prediabetes, s/p robotic MIDCAB with Dr. Santos on 02/09/2022, bladder mass concerning for malignancy (not biopsy proved), and gastric mass presented to OSH for AMS. Workup with brain imaging revealed b/l scattered infarcts with the largest in the basal ganglia and insular region. Vessel imaging with focal right M2 stenosis secondary to high grade near total occlusion vs focal occlusion. At OSH, EEG was negative for seizure, with TTE reportedly with evidence of PFO (which was not seen at [facility] on his prior admission). Cerebral angiogram with moderate stenosis of b/l ICA and mild stenosis of b/l ICA at origin, moderate to severe stenosis of left subclavian.  He was transferred to [facility] for further management. On admission to [facility], NIHSS 4. Patient was started on aspirin only in anticipation of EGD/EUS for  w/u of gastric mass. Urine cytology sent for bladder mass w/u per . Course complicated by hypernatremia, new RMCA infarct 3/3/22 manifesting as R gaze deviation and left sided weakness, NIHSS 22. Imaging significant for distal M1 occlusion, CTP with penumbra with no core infarct. S/p R M1 thrombectomy TICI 1 to TICI 3 with some contrast extravasation in RMCA territory and L caudate territory. Despite successful thrombectomy, patient with significant disabling deficits. Overall, given suspected cancer, disabling stroke, and other comorbidities, prognosis is poor. In conjunction with palliative care, ethics, medicine and roommate [facility] who is acting as a surrogate in absence of known family, decision made to pursue comfort care with 2 physician consent. Medication in line with comfort based approach initiated. Patient discharged to [place] for end of life care.         During this hospital course, patient had multiple ischemic strokes in b/l hemispheres on CTH and MRI. Etiology likely due to occult afib, ICAD, hypercoagable state secondary to underlying malignancy.     Patient had the following workup done in house:  CTH 2/26: Age-indeterminate infarcts in the right frontal operculum, right corona   radiata, right basal ganglia, and right insular cortex   CTH 3/3: Evolving sites of subacute infarct in the right MCA territory. No   definite new infarct since 2/26/22, and no sign of hemorrhagic   transformation.  CTP 3/3: Positive CT perfusion. There is elevated transit time with mismatch in   cerebral blood flow at the right MCA territory.  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 Multifocal severe   and moderate narrowing of the left proximal subclavian artery. Mild   narrowing at the origin of the cervical ICAs. Tapering to complete occlusion of the right distal M1 segment   at the right MCA bifurcation. Mild to moderate narrowing of the right   distal petrous and cavernous ICA. Moderate narrowing of the left   cavernous and supraclinoid ICA.  CT Head 3/5: improvement of contrast staining, evolving right MCA stroke, also stable bilateral strokes compared to other CTs  TTE 2/24:  1. Aortic sclerosis without significant stenosis.   2. Mild aortic regurgitation.   3.Mild pulmonic regurgitation.   4. No other significant valvular disease.   5. Normal right ventricular size and systolic function.   6. The left ventricle cavity size is normal. There is mild concentric   left ventricular hypertrophy. Abnormal septalmotion seen due to abnormal   conduction. Left ventricular systolic function is severely reduced with a   calculated ejection fraction of 20% with regional wall motion   abnormalities.   7. Trivial pericardial effusion without echocardiographic evidence of   cardiac tamponade physiology.   8. Bilateral pleural effusion.   9. Compared to the previous TTE performed on 2/9/2022, pericardial   effusion appears to have decreased however image quality was better on   prior study.  JOEY 3/4:  1. Limited study obtained for evaluation of PFO.   2. The interatrial septum appears intact. Injection of agitated saline   via a peripheral vein reveals no evidence of a right-to-left shunt.  labs: A1c 5.4, LDL 90    Physical exam at discharge:  Neurologic:  -Mental status: Stuporous, opened eyes slightly to noxious stimuli, otherwise did not follow commands.  -Cranial nerves:   II: Visual fields are full to confrontation.  III, IV, VI: Extraocular movements are intact without nystagmus. Pupils equally round and reactive to light  VII: Left facial droop  Motor: Decreased bulk. Right arm and leg moving spontaneously at least 3/5, 5/5  strength, does not follow command to hold up for 10 seconds. Left arm 0/5, left leg 0/5  Sensation: No reaction to noxious stimuli of left arm    Discharge NIHSS: 22    Pending extensive [facility] conversations with primary team, ethics, medical co-management, and palliative care, patient was made DNR/DNI with comfort measures. This decision was based on patient's active comorbidities, including devastating strokes, advanced heart failure, dysphagia, encephalopathy, and possible abdominal malignancy, as culmination of active medical issues is irreversible. Patient was medically optimized for discharge to [place] for ongoing end of life care. Patients roommate, Pascual Sands, who is acting as a surrogate, with the absence of any known family, was also in agreement with plan.

## 2022-03-08 NOTE — PROGRESS NOTE ADULT - REASON FOR ADMISSION
CVA

## 2022-03-08 NOTE — PROGRESS NOTE ADULT - SUBJECTIVE AND OBJECTIVE BOX
Physical Medicine and Rehabilitation Progress Note:    Patient is a 71y old  Male who presents with a chief complaint of CVA (08 Mar 2022 12:10)      HPI:  This is a 71 year old Serbian speaking male, smoker (very recently quit, smoked x 40 years) with PMHx of CHF (EF 25-30%) and prediabetes (A1c 5.9) who is now s/p robotic MIDCAB with Dr. Santos on 02/09/2022.  Post OR he was extubated on POD 0, started on beta blockers by POD 2 and transferred to telemetry.  By POD 4 he was ambulating independently, on room air, having normal BM's, tolerating PO diet with surgical pain under control. He was discharged home on POD 4.  On 02/20/2022 patient presented to OSH with AMS. Work up included CT without, CTA Head and Neck and MRI Jerry showing acute MCA CVA, b/l acute inracts  with the largest in the right basal ganglia and insular region. EEG negative for seizure. TTE = ? PFO. Patient transfer to [facility] for further MGMT and work up.      PMH/PSH:   See HPI.    SH:  40 years of 1ppd smoking, stopped 01/2022  Alcohol: 1 drink per night, stopped 01/2022  Elicit drugs: No  Occupation: ?  Home: Yes    FH:  N/A    Allergies:   See above.     Home Rx.:   See below. (23 Feb 2022 17:49)                            11.7   6.66  )-----------( 194      ( 07 Mar 2022 06:34 )             35.7       03-07    149<H>  |  114<H>  |  23  ----------------------------<  136<H>  3.9   |  24  |  0.59    Ca    8.9      07 Mar 2022 06:34  Mg     2.3     03-07      Vital Signs Last 24 Hrs  T(C): 36.6 (08 Mar 2022 14:19), Max: 36.6 (07 Mar 2022 20:43)  T(F): 97.9 (08 Mar 2022 14:19), Max: 97.9 (08 Mar 2022 14:19)  HR: 91 (08 Mar 2022 14:19) (72 - 96)  BP: 153/94 (08 Mar 2022 14:19) (120/76 - 153/94)  BP(mean): --  RR: 18 (08 Mar 2022 14:19) (17 - 18)  SpO2: 97% (08 Mar 2022 14:19) (95% - 100%)    MEDICATIONS  (STANDING):  dextrose 50% Injectable 25 Gram(s) IV Push once  metoprolol tartrate Injectable 2.5 milliGRAM(s) IV Push every 12 hours    MEDICATIONS  (PRN):  glycopyrrolate Injectable 0.2 milliGRAM(s) IV Push every 6 hours PRN secretions  LORazepam   Injectable 0.5 milliGRAM(s) IV Push every 4 hours PRN Agitation  morphine  - Injectable 2 milliGRAM(s) IV Push every 3 hours PRN respiratory rate >22    Currently Undergoing Physical/ Occupational Therapy at bedside.    PT/OT Functional Status Assessment:   3/7/2022        Cognitive/Neuro/Behavioral  Level of Consciousness: lethargic  Arousal Level: arouses to vigorous stimulation;  does not remain aroused  Orientation: CHATO  Speech: CHATO  Mood/Behavior: calm    Language Assistance  Preferred Language to Address Healthcare Preferred Language to Address Healthcare: Japanese  's name: Gely   Patient/Caregiver offered   services: yes  Patient/Caregiver accepted  services: Pt unable to verbalize    Therapeutic Interventions      Bed Mobility  Bed Mobility Training Rehab Potential: poor  Bed Mobility Training Rolling/Turning: dependent (less than 25% patient effort);  2 person assist;  verbal cues;  nonverbal cues (demo/gestures)  Bed Mobility Training Sit-to-Supine: dependent (less than 25% patient effort);  2 person assist  Bed Mobility Training Supine-to-Sit: dependent (less than 25% patient effort);  2 person assist;  verbal cues;  nonverbal cues (demo/gestures)  Bed Mobility Training Limitations: cognitive, decreased safety awareness          PM&R Impression: as above    Current Disposition Plan :  [facility]

## 2022-03-08 NOTE — PROGRESS NOTE ADULT - PROVIDER SPECIALTY LIST ADULT
Hospitalist
Neurology
CT Surgery
Ethics
Gastroenterology
Hospitalist
Neurology
Neurosurgery
Rehab Medicine
Rehab Medicine
CT Surgery
Gastroenterology
Heart Failure
Hospitalist
NSICU
NSICU
Neurology
Urology
CT Surgery
CT Surgery
Gastroenterology
Hospitalist
Neurology
Neurology
Urology
CT Surgery
Hospitalist
NSICU
Neurology
Neurology
Neurosurgery
Neurosurgery
Palliative Care
Palliative Care

## 2022-03-08 NOTE — DISCHARGE NOTE PROVIDER - NSDCMRMEDTOKEN_GEN_ALL_CORE_FT
acetaminophen 325 mg oral tablet: 2 tab(s) orally every 6 hours, As needed, Mild Pain (1 - 3)  aspirin 81 mg oral tablet, chewable: 1 tab(s) orally once a day  atorvastatin 80 mg oral tablet: 1 tab(s) orally once a day (at bedtime)  furosemide 20 mg oral tablet: 1 tab(s) orally once a day  metoprolol tartrate 25 mg oral tablet: 1 tab(s) orally every 12 hours   pantoprazole 40 mg oral delayed release tablet: 1 tab(s) orally once a day (before a meal)  polyethylene glycol 3350 oral powder for reconstitution: 17 gram(s) orally once a day  senna oral tablet: 2 tab(s) orally once a day (at bedtime)   glycopyrrolate 0.2 mg/mL injectable solution: 0.2 milligram(s) intravenous every 6 hours, As Needed  LORazepam: 0.5 milligram(s) intravenous every 4 hours, As Needed  metoprolol tartrate 1 mg/mL injectable solution: 2.5 milligram(s) intravenous every 12 hours  morphine: 2 milligram(s) intravenous every 4 hours, As Needed

## 2022-03-08 NOTE — DISCHARGE NOTE PROVIDER - NSDCCPCAREPLAN_GEN_ALL_CORE_FT
PRINCIPAL DISCHARGE DIAGNOSIS  Diagnosis: Encounter for palliative care  Assessment and Plan of Treatment: Extensive [facility] conversations with primary team, ethics, medical co-management, and palliative care, and you were made DNR/DNI with comfort measures. This decision was based on your active comorbidities, including devastating strokes, advanced heart failure, dysphagia, encephalopathy, and possible abdominal malignancy, as culmination of active medical issues is irreversible. You was medically optimized for discharge to [place] for ongoing end of life care. Your roommate, Pascual Sands, who is acting as a surrogate, with the absence of any known family, was also in agreement with plan.      SECONDARY DISCHARGE DIAGNOSES  Diagnosis: Dysphagia  Assessment and Plan of Treatment: You were unable to tolerate PO intake, so all medications were switched to IV for pain control from PO. All home PO medications were discontinued due to aspiration risk. You were started on IV regimen for pain and secretion management as per palliative care.

## 2022-03-08 NOTE — DISCHARGE NOTE PROVIDER - NSDCFUADDAPPT_GEN_ALL_CORE_FT
1. You were discharged to [facility] care. Please follow-up with your primary care physician if you choose to do so.

## 2022-03-08 NOTE — PROGRESS NOTE ADULT - NUTRITIONAL ASSESSMENT
This patient has been assessed with a concern for Malnutrition and has been determined to have a diagnosis/diagnoses of Severe protein-calorie malnutrition and Underweight (BMI < 19).    This patient is being managed with:   Diet DASH/TLC-  Sodium & Cholesterol Restricted  Soft and Bite Sized (SOFTBTSZ)  Mildly Thick Liquids (MILDTHICKLIQS)  Supplement Feeding Modality:  Oral  Ensure Enlive Cans or Servings Per Day:  1       Frequency:  Two Times a day  Entered: Feb 25 2022  4:42PM    
This patient has been assessed with a concern for Malnutrition and has been determined to have a diagnosis/diagnoses of Severe protein-calorie malnutrition and Underweight (BMI < 19).    This patient is being managed with:   Diet NPO with Tube Feed-  Tube Feeding Modality: Nasogastric  Jevity 1.5 Memo (JEVITY1.5)  Total Volume for 24 Hours (mL): 720  Total Number of Cans: 3  Continuous  Starting Tube Feed Rate {mL per Hour}: 10  Increase Tube Feed Rate by (mL): 10     Every 4 hours  Until Goal Tube Feed Rate (mL per Hour): 30  Tube Feed Duration (in Hours): 24  Tube Feed Start Time: 00:30  Volume Based Feeding Titration:  If the patient has achieved and tolerated the prescribed goal rate and tube feeding has been held within a 24hr period titrate tube feeding rate based on guidelines up to a maximum rate of 120mL/hr  Entered: Mar  4 2022 12:15AM    
This patient has been assessed with a concern for Malnutrition and has been determined to have a diagnosis/diagnoses of Severe protein-calorie malnutrition and Underweight (BMI < 19).    This patient is being managed with:   Diet NPO with Tube Feed-  Tube Feeding Modality: Nasogastric  Jevity 1.5 Memo (JEVITY1.5)  Total Volume for 24 Hours (mL): 720  Total Number of Cans: 3  Continuous  Starting Tube Feed Rate {mL per Hour}: 10  Increase Tube Feed Rate by (mL): 10     Every 4 hours  Until Goal Tube Feed Rate (mL per Hour): 30  Tube Feed Duration (in Hours): 24  Tube Feed Start Time: 00:30  Volume Based Feeding Titration:  If the patient has achieved and tolerated the prescribed goal rate and tube feeding has been held within a 24hr period titrate tube feeding rate based on guidelines up to a maximum rate of 120mL/hr  Entered: Mar  4 2022 12:15AM    
This patient has been assessed with a concern for Malnutrition and has been determined to have a diagnosis/diagnoses of Severe protein-calorie malnutrition and Underweight (BMI < 19).    This patient is being managed with:   Diet Soft and Bite Sized-  DASH/TLC {Sodium & Cholesterol Restricted} (DASH)  Mildly Thick Liquids (MILDTHICKLIQS)  Supplement Feeding Modality:  Oral  Ensure Enlive Cans or Servings Per Day:  1       Frequency:  Two Times a day  Entered: Feb 24 2022  2:56PM    
This patient has been assessed with a concern for Malnutrition and has been determined to have a diagnosis/diagnoses of Severe protein-calorie malnutrition and Underweight (BMI < 19).    This patient is being managed with:   Diet Soft and Bite Sized-  DASH/TLC {Sodium & Cholesterol Restricted} (DASH)  Mildly Thick Liquids (MILDTHICKLIQS)  Supplement Feeding Modality:  Oral  Ensure Enlive Cans or Servings Per Day:  1       Frequency:  Two Times a day  Entered: Feb 24 2022  2:56PM    
This patient has been assessed with a concern for Malnutrition and has been determined to have a diagnosis/diagnoses of Severe protein-calorie malnutrition and Underweight (BMI < 19).      
This patient has been assessed with a concern for Malnutrition and has been determined to have a diagnosis/diagnoses of Severe protein-calorie malnutrition and Underweight (BMI < 19).    This patient is being managed with:   Diet DASH/TLC-  Sodium & Cholesterol Restricted  Soft and Bite Sized (SOFTBTSZ)  Mildly Thick Liquids (MILDTHICKLIQS)  Supplement Feeding Modality:  Oral  Ensure Enlive Cans or Servings Per Day:  1       Frequency:  Two Times a day  Entered: Feb 25 2022  4:42PM    
This patient has been assessed with a concern for Malnutrition and has been determined to have a diagnosis/diagnoses of Severe protein-calorie malnutrition and Underweight (BMI < 19).    This patient is being managed with:   Diet DASH/TLC-  Sodium & Cholesterol Restricted  Soft and Bite Sized (SOFTBTSZ)  Mildly Thick Liquids (MILDTHICKLIQS)  Supplement Feeding Modality:  Oral  Ensure Enlive Cans or Servings Per Day:  1       Frequency:  Two Times a day  Entered: Feb 25 2022  4:42PM      This patient has been assessed with a concern for Malnutrition and has been determined to have a diagnosis/diagnoses of Severe protein-calorie malnutrition and Underweight (BMI < 19).    This patient is being managed with:   Diet DASH/TLC-  Sodium & Cholesterol Restricted  Soft and Bite Sized (SOFTBTSZ)  Mildly Thick Liquids (MILDTHICKLIQS)  Supplement Feeding Modality:  Oral  Ensure Enlive Cans or Servings Per Day:  1       Frequency:  Two Times a day  Entered: Feb 25 2022  4:42PM    
This patient has been assessed with a concern for Malnutrition and has been determined to have a diagnosis/diagnoses of Severe protein-calorie malnutrition and Underweight (BMI < 19).    This patient is being managed with:   Diet NPO with Tube Feed-  Tube Feeding Modality: Nasogastric  Jevity 1.5 Memo (JEVITY1.5)  Total Volume for 24 Hours (mL): 720  Total Number of Cans: 3  Continuous  Starting Tube Feed Rate {mL per Hour}: 10  Increase Tube Feed Rate by (mL): 10     Every 4 hours  Until Goal Tube Feed Rate (mL per Hour): 30  Tube Feed Duration (in Hours): 24  Tube Feed Start Time: 00:30  Volume Based Feeding Titration:  If the patient has achieved and tolerated the prescribed goal rate and tube feeding has been held within a 24hr period titrate tube feeding rate based on guidelines up to a maximum rate of 120mL/hr  Entered: Mar  4 2022 12:15AM    
This patient has been assessed with a concern for Malnutrition and has been determined to have a diagnosis/diagnoses of Severe protein-calorie malnutrition and Underweight (BMI < 19).      
This patient has been assessed with a concern for Malnutrition and has been determined to have a diagnosis/diagnoses of Severe protein-calorie malnutrition and Underweight (BMI < 19).    This patient is being managed with:   Diet NPO with Tube Feed-  Tube Feeding Modality: Nasogastric  Jevity 1.5 Memo (JEVITY1.5)  Total Volume for 24 Hours (mL): 720  Total Number of Cans: 3  Continuous  Starting Tube Feed Rate {mL per Hour}: 10  Increase Tube Feed Rate by (mL): 10     Every 4 hours  Until Goal Tube Feed Rate (mL per Hour): 30  Tube Feed Duration (in Hours): 24  Tube Feed Start Time: 00:30  Volume Based Feeding Titration:  If the patient has achieved and tolerated the prescribed goal rate and tube feeding has been held within a 24hr period titrate tube feeding rate based on guidelines up to a maximum rate of 120mL/hr  Entered: Mar  4 2022 12:15AM    
This patient has been assessed with a concern for Malnutrition and has been determined to have a diagnosis/diagnoses of Severe protein-calorie malnutrition and Underweight (BMI < 19).    This patient is being managed with:   Diet DASH/TLC-  Sodium & Cholesterol Restricted  Soft and Bite Sized (SOFTBTSZ)  Mildly Thick Liquids (MILDTHICKLIQS)  Supplement Feeding Modality:  Oral  Ensure Enlive Cans or Servings Per Day:  1       Frequency:  Two Times a day  Entered: Feb 25 2022  4:42PM    
This patient has been assessed with a concern for Malnutrition and has been determined to have a diagnosis/diagnoses of Severe protein-calorie malnutrition and Underweight (BMI < 19).    This patient is being managed with:   Diet NPO with Tube Feed-  Tube Feeding Modality: Nasogastric  Jevity 1.5 Memo (JEVITY1.5)  Total Volume for 24 Hours (mL): 720  Total Number of Cans: 3  Continuous  Starting Tube Feed Rate {mL per Hour}: 10  Increase Tube Feed Rate by (mL): 10     Every 4 hours  Until Goal Tube Feed Rate (mL per Hour): 30  Tube Feed Duration (in Hours): 24  Tube Feed Start Time: 00:30  Volume Based Feeding Titration:  If the patient has achieved and tolerated the prescribed goal rate and tube feeding has been held within a 24hr period titrate tube feeding rate based on guidelines up to a maximum rate of 120mL/hr  Entered: Mar  4 2022 12:15AM    
This patient has been assessed with a concern for Malnutrition and has been determined to have a diagnosis/diagnoses of Severe protein-calorie malnutrition and Underweight (BMI < 19).    This patient is being managed with:   Diet Soft and Bite Sized-  DASH/TLC {Sodium & Cholesterol Restricted} (DASH)  Mildly Thick Liquids (MILDTHICKLIQS)  Entered: Feb 24 2022 12:16PM    
This patient has been assessed with a concern for Malnutrition and has been determined to have a diagnosis/diagnoses of Severe protein-calorie malnutrition and Underweight (BMI < 19).    This patient is being managed with:   Diet DASH/TLC-  Sodium & Cholesterol Restricted  Soft and Bite Sized (SOFTBTSZ)  Mildly Thick Liquids (MILDTHICKLIQS)  Supplement Feeding Modality:  Oral  Ensure Enlive Cans or Servings Per Day:  1       Frequency:  Two Times a day  Entered: Feb 25 2022  4:42PM    
This patient has been assessed with a concern for Malnutrition and has been determined to have a diagnosis/diagnoses of Severe protein-calorie malnutrition and Underweight (BMI < 19).    This patient is being managed with:   Diet NPO-  Entered: Mar  3 2022  3:24PM

## 2023-01-26 ENCOUNTER — NON-APPOINTMENT (OUTPATIENT)
Age: 73
End: 2023-01-26

## 2023-12-13 NOTE — PHYSICAL THERAPY INITIAL EVALUATION ADULT - TRANSFER SAFETY CONCERNS NOTED: SIT/STAND, REHAB EVAL
[TextEntry] : 79-year-old male status post ileostomy closure who is doing very well.  No dietary restrictions.  No heavy lifting pushing or straining for the next month.  Follow-up in 4 to 6 weeks to assess complete wound.  He is yet to follow-up with oncology since his initial resection given his complicated postop course.  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  However this is his second colon malignancy and he needs close surveillance.
decreased weight-shifting ability

## 2024-01-03 NOTE — H&P ADULT - ASSESSMENT
Neurovascular:   No delirium. Pain well controlled with current regimen.    Cardiovascular:   Hemodynamically stable. HR controlled    CAD  -continue aspirin and Plavix for acute graft patency,  -continue statin for long term graft patency  BP  -continue _____ , for BP control       Respiratory:   02 Sat = 98% on RA.  -Wean to RA from for O2 Sat > 93%.  -Encourage Cough, deep breathing and Use of IS 10x / hr while awake.  -Chest PT 4xdaily    GI:   Stable  -protonix for GI protection  -Miralax for bowl regemin  -PO DASH diet  -Last BM:    Renal / :   BUN/Cr Stable  -Continue to monitor I/O's.    Endocrine:    Blood sugar stable        Hematologic:  H/H stable  -DVT prophylaxis with Heparin sq    ID:  -Afebrile  -Continue to observe for SIRS/Sepsis Syndrome.    Disposition:  Possible home Neurovascular:   No delirium. Pain well controlled with current regimen.    Cardiovascular:   Hemodynamically stable. HR controlled    #CAD, Presurgical for MIDCABG on 2/9  -continue aspirin =  -continue statin for long term graft patency  -continue Metoprolol 25mg for BP control     #CHF   -Delay diuretic use pending surgery on 2/9    Respiratory:   02 Sat = ____ on RA.  -Wean to RA from for O2 Sat > 93%.  -Encourage Cough, deep breathing and Use of IS 10x / hr while awake.  -Chest PT 4xdaily    GI:   Stable  -protonix for GI protection  -Miralax for bowl regemin  -PO DASH diet  -Last BM: ____     Renal / :   BUN/Cr Stable  -Continue to monitor I/O's    Endocrine:    #Prediabetes   Last A1C 5.9 on 9/16   Pending labs    Hematologic:  H/H stable   -DVT prophylaxis with Heparin sq    ID:  -Afebrile  -Continue to observe for SIRS/Sepsis Syndrome    Disposition:  Presurgical evaluation for tomorrow Neurovascular:   No delirium. Pain well controlled with current regimen.    Cardiovascular:   Hemodynamically stable. HR controlled    #CAD, Presurgical workup for severe LAD stenosis (EF 25-30%), planned MIDCABG  - CXR, Urinalysis, Blood Work including: CMP, CBC, A1C, PTT/INR, Lipid panel, Cardiac enzymes, TSH, Pro-BNP, T&Sx2, ABG. TTE, PFT, EKG, Carotid Ultrasound pending  - ASA, Lipitor, BB, Imdur ordered for ACS   - Nitro SL PRN for chest pain   - low threshold for adding heparin with persistent chest pain     #CHF   -Delay diuretic use pending surgery on 2/9    Respiratory:   02 Sat = ____ on RA.  -Wean to RA from for O2 Sat > 93%.  -Encourage Cough, deep breathing and Use of IS 10x / hr while awake.  -Chest PT 4xdaily    GI:   Stable  -protonix for GI protection  -Miralax for bowl regemin  -PO DASH diet    Renal / :   BUN/Cr Stable (42/1.1 on 2.7), pending updated labs  -Continue to monitor I/O's    Endocrine:    #Prediabetes   -Last A1C 5.9 on 9/16 , pending updated A1C   Thyroid Panel pending     Hematologic:  -H/H stable (14.4/42.8 on 2/7), pending updated labs  -DVT prophylaxis with Heparin sq    ID:  -Afebrile  -Continue to observe for SIRS/Sepsis Syndrome    Disposition:  Preoperative planning 70 yo Martiniquais speaking male current smoker (stopped 1 wk ago, smoked for 40 years) with PMHx of CHF (EF 25-30%) and prediabetes (A1c 5.9) coming in for presurgical evaluation. He went to [facility] on 2/6 complaining of SOB x 1 week. Pt stated he had similar symptoms 5 months ago and was admitted but never followed up for an outpatient workup. On 2/6 a TTE was done showing LVEF 25-30% and acute on chronic systolic and diastolic CHF. He was then send to [facility] for a CT Coronary Angio on 2/7 which showed severe heavily calcified proximal LAD stenosis to distal L Main Artery. Now presents asymptotically at [facility] for surgical evaluation in anticipation of Robotic MIDCABG LIMA-LAD with Dr. Santos on 2/9. Upon arrival pt denies CP, SOB, abd pain, bowel or urinary changes, N/V, HA, dizziness, vision changes, weakness, dizziness, numbness or tingling, previous abdominal or thoracic surgeries, previous bleeding conditions, recent ASA or blood thinner use.    Neurovascular:   No delirium. Pain well controlled with current regimen.    Cardiovascular:   Hemodynamically stable. HR controlled    #CAD, Presurgical workup for severe LAD stenosis (EF 25-30%), planned MIDCABG  - CXR, Urinalysis, Blood Work including: CMP, CBC, A1C, PTT/INR, Lipid panel, Cardiac enzymes, TSH, Pro-BNP, T&Sx2, ABG. TTE, PFT, EKG, Carotid Ultrasound pending  - ASA, Lipitor, BB, Imdur ordered for ACS   - Nitro SL PRN for chest pain   - low threshold for adding heparin with persistent chest pain     #CHF   -Delay diuretic use pending surgery on 2/9    Respiratory:   02 Sat = 100% on RA  -Wean to RA from for O2 Sat > 93%.  -Encourage Cough, deep breathing and Use of IS 10x / hr while awake.  -Chest PT 4xdaily    GI:   Stable  -Protonix for GI protection  -Miralax for bowl regimen  -PO DASH diet    Renal / :   BUN/Cr Stable (42/1.1 on 2.7),   -Pending updated labs  -Continue to monitor I/O's    Endocrine:    #Prediabetes   -Last A1C 5.9 on 9/16  -Pending updated A1C   Thyroid Panel pending     Hematologic:  -H/H stable (14.4/42.8 on 2/7)  -Pending updated labs  -DVT prophylaxis with Heparin sq    ID:  -Afebrile (36.7F)   -Continue to observe for SIRS/Sepsis Syndrome    Disposition:  Preoperative planning 72 yo Lao speaking male current smoker (stopped 1 wk ago, smoked for 40 years) with PMHx of CHF (EF 25-30%) and prediabetes (A1c 5.9) coming in for presurgical evaluation. He went to [facility] on 2/6 complaining of SOB x 1 week. Pt stated he had similar symptoms 5 months ago lost to follow up, returend On 2/6 a TTE was done showing LVEF 25-30% and acute on chronic systolic and diastolic CHF. He was then send to [facility] for a CT Coronary Angio on 2/7 which showed severe heavily calcified proximal LAD stenosis to distal L Main Artery. Now presents asymptotically at [facility] for surgical evaluation in anticipation of Robotic MIDCABG LIMA-LAD with Dr. Santos on 2/9.     Neurovascular:   No delirium. Pain well controlled with current regimen.  -Tylenol prn for pain    Cardiovascular:   Hemodynamically stable. HR controlled  #CAD, Presurgical workup for severe LAD stenosis (EF 25-30%), planned MIDCABG  - CXR, Urinalysis, Blood Work including: CMP, CBC, A1C, PTT/INR, Lipid panel, Cardiac enzymes, TSH, Pro-BNP, T&Sx2, ABG. TTE, PFT, EKG, Carotid Ultrasound pending  - ASA, Lipitor, BB, Imdur ordered for ACS   - Nitro SL PRN for chest pain   - low threshold for adding heparin with persistent chest pain     # new onset CHF   - lasix paused pending bun/cr     Respiratory:   ?PMHx of COPD   Current smoker (quit 1 week ago)   pending PA/Lateral cxry  02 Sat = 100% on RA  -Wean to RA from for O2 Sat > 93%.  -Encourage Cough, deep breathing and Use of IS 10x / hr while awake.  -Chest PT 4xdaily    GI:   Outside Hospital report of ?GI mass, plan for outpatient EGD   -Protonix for GI protection  -Miralax for bowl regimen  - ? GI consult pending more documentation from [facility]     Renal / :   BUN/Cr Stable (42/1.1 on 2.7),   ?PMHx of bladder mass pending cystoscopy and biopsy lost to biopsy  -Pending updated labs  -Continue to monitor I/O's    Endocrine:    #Prediabetes   -Last A1C 5.9 on 9/16  -Pending updated A1C   Thyroid Panel pending     Hematologic:  -H/H stable (14.4/42.8 on 2/7)  -Pending updated labs  -DVT prophylaxis with Heparin sq    ID:  -Afebrile (36.7F)   -Continue to observe for SIRS/Sepsis Syndrome    Disposition:  Preoperative planning Never smoker
